# Patient Record
Sex: MALE | Race: WHITE | NOT HISPANIC OR LATINO | Employment: UNEMPLOYED | URBAN - NONMETROPOLITAN AREA
[De-identification: names, ages, dates, MRNs, and addresses within clinical notes are randomized per-mention and may not be internally consistent; named-entity substitution may affect disease eponyms.]

---

## 2017-01-24 ENCOUNTER — COMMUNICATION - GICH (OUTPATIENT)
Dept: FAMILY MEDICINE | Facility: OTHER | Age: 42
End: 2017-01-24

## 2017-10-09 ENCOUNTER — COMMUNICATION - GICH (OUTPATIENT)
Dept: FAMILY MEDICINE | Facility: OTHER | Age: 42
End: 2017-10-09

## 2017-10-09 DIAGNOSIS — R73.9 HYPERGLYCEMIA: ICD-10-CM

## 2017-11-14 ENCOUNTER — HISTORY (OUTPATIENT)
Dept: FAMILY MEDICINE | Facility: OTHER | Age: 42
End: 2017-11-14

## 2017-11-14 ENCOUNTER — OFFICE VISIT - GICH (OUTPATIENT)
Dept: FAMILY MEDICINE | Facility: OTHER | Age: 42
End: 2017-11-14

## 2017-11-14 DIAGNOSIS — M25.531 PAIN IN RIGHT WRIST: ICD-10-CM

## 2017-11-14 DIAGNOSIS — F32.1 MAJOR DEPRESSIVE DISORDER, SINGLE EPISODE, MODERATE (H): ICD-10-CM

## 2017-11-14 DIAGNOSIS — K21.9 GASTRO-ESOPHAGEAL REFLUX DISEASE WITHOUT ESOPHAGITIS: ICD-10-CM

## 2017-11-14 DIAGNOSIS — M77.12 LATERAL EPICONDYLITIS OF LEFT ELBOW: ICD-10-CM

## 2017-11-14 DIAGNOSIS — M54.50 LOW BACK PAIN: ICD-10-CM

## 2017-11-14 DIAGNOSIS — E78.5 HYPERLIPIDEMIA: ICD-10-CM

## 2017-11-14 DIAGNOSIS — R73.9 HYPERGLYCEMIA: ICD-10-CM

## 2017-11-14 DIAGNOSIS — J40 BRONCHITIS: ICD-10-CM

## 2017-11-14 DIAGNOSIS — I10 ESSENTIAL (PRIMARY) HYPERTENSION: ICD-10-CM

## 2017-11-14 DIAGNOSIS — M25.532 PAIN IN LEFT WRIST: ICD-10-CM

## 2017-11-14 LAB
A/G RATIO - HISTORICAL: 2.4 (ref 1–2)
ALBUMIN SERPL-MCNC: 4.6 G/DL (ref 3.5–5.7)
ALP SERPL-CCNC: 88 IU/L (ref 34–104)
ALT (SGPT) - HISTORICAL: 44 IU/L (ref 7–52)
ANION GAP - HISTORICAL: 15 (ref 5–18)
AST SERPL-CCNC: 22 IU/L (ref 13–39)
BILIRUB SERPL-MCNC: 0.6 MG/DL (ref 0.3–1)
BUN SERPL-MCNC: 9 MG/DL (ref 7–25)
BUN/CREAT RATIO - HISTORICAL: 8
CALCIUM SERPL-MCNC: 9.3 MG/DL (ref 8.6–10.3)
CHLORIDE SERPLBLD-SCNC: 97 MMOL/L (ref 98–107)
CHOL/HDL RATIO - HISTORICAL: 5.04
CHOLESTEROL TOTAL: 262 MG/DL
CO2 SERPL-SCNC: 27 MMOL/L (ref 21–31)
CREAT SERPL-MCNC: 1.1 MG/DL (ref 0.7–1.3)
ESTIMATED AVERAGE GLUCOSE: 260 MG/DL
GFR IF NOT AFRICAN AMERICAN - HISTORICAL: >60 ML/MIN/1.73M2
GLOBULIN - HISTORICAL: 1.9 G/DL (ref 2–3.7)
GLUCOSE SERPL-MCNC: 343 MG/DL (ref 70–105)
HDLC SERPL-MCNC: 52 MG/DL (ref 23–92)
HEMOGLOBIN A1C MONITORING (POCT) - HISTORICAL: 10.7 % (ref 4–6.2)
LDLC SERPL CALC-MCNC: 171 MG/DL
NON-HDL CHOLESTEROL - HISTORICAL: 210 MG/DL
POTASSIUM SERPL-SCNC: 4.1 MMOL/L (ref 3.5–5.1)
PROT SERPL-MCNC: 6.5 G/DL (ref 6.4–8.9)
PROVIDER ORDERDED STATUS - HISTORICAL: ABNORMAL
SODIUM SERPL-SCNC: 139 MMOL/L (ref 133–143)
TRIGL SERPL-MCNC: 193 MG/DL

## 2017-11-14 ASSESSMENT — PATIENT HEALTH QUESTIONNAIRE - PHQ9: SUM OF ALL RESPONSES TO PHQ QUESTIONS 1-9: 0

## 2017-12-27 NOTE — PROGRESS NOTES
"Patient Information     Patient Name MRN Sex Herb John [id number] Male 1975      Progress Notes by Sg Szymanski MD at 2017  7:45 AM     Author:  Sg Szymanski MD Service:  (none) Author Type:  Physician     Filed:  2017  8:46 AM Encounter Date:  2017 Status:  Signed     :  Sg Szymanski MD (Physician)            SUBJECTIVE:  Herb Figueroa is a 42 y.o. male here for follow-up. He was last seen in 2016 and diagnosed diabetes. He was started on metformin and was supposed be taking it twice a day and follow up within a month or 2. He has not followed up since. He states that he \"ran out of his insurance.\" He does not check his blood sugars but reports that he takes his metformin generally once daily. He admits to skipping doses occasionally as he forgets them. He has had no side effects.    He continues to have widespread muscle skeletal pain including joint and muscle pain. This seems to be in both his upper and lower extremity. This is a chronic complaint for him.    He continues on Prozac for depression and this has been stable.    He states that he has been having a productive cough for the last couple of weeks. He has occasionally felt short of breath. No wheezing. No fevers or chills.    He is right-hand dominant but has had bilateral wrist and elbow pain. He is here requesting injection in his elbow and wrist on his left side. He states that he uses his left arm quite a bit as a guide while working.      Patient Active Problem List       Diagnosis  Date Noted     SCOTT (obstructive sleep apnea)  10/15/2015     Moderate, sleep study 10-        HYPERLIPIDEMIA, MILD  2011     DEPRESSION, MAJOR  2010     CARPAL TUNNEL SYNDROME, RIGHT  2010     HYPERTENSION  2010     OBESITY       GASTRITIS       with dysphagia          DEGENERATIVE DISC DISEASE, THORACIC SPINE       Thoracic degenerative spine disease/juvenile disc disease- " Samir Acosta          SCOLIOSIS       Dr.Scott Acosta          BACK PAIN, THORACIC REGION       GOUT  10/01/2005     left great toe            Past Medical History:     Diagnosis  Date     Asthma, mild intermittent      Depression     After the death of his daughter in MVA      Essential hypertension, benign      GERD (gastroesophageal reflux disease)      Normal cardiac stress test 02-11     Negative stress test      Suicidal ideation 2013    Hospitalized in [place]      TOBACCO ABUSE     age [age], 1-2 packs per day, Quit Aug 2005         Past Surgical History:      Procedure  Laterality Date     LAP CHOLECYSTECTOMY  11-05    Dr. Dunne       TYMPANOSTOMY         Current Outpatient Prescriptions       Medication  Sig Dispense Refill     azithromycin (ZITHROMAX) 250 mg tablet Take 500 mg (2 tabs) by mouth on day 1, then 250 mg (1 tab) daily for days 2-5. 6 tablet 0     CPAP CPAP at pressure: 8cm H2O Length of Need: Lifetime 1 unit 0     DME C-PAP supplies and filters, Dx: SCOTT 1 Each 0     FLUoxetine (PROZAC) 20 mg capsule TAKE ONE CAPSULE BY MOUTH ONCE DAILY, IN  ADDITION  TO  A  40MG  CAPSULE.  TOTAL  OF  60MG  DAILY. 90 capsule 3     FLUoxetine (PROZAC) 40 mg capsule TAKE ONE CAPSULE BY MOUTH ONCE DAILY IN  ADDITION  TO  20MG  TABLET.  TOTAL  60MG  DAILY. 90 capsule 3     gabapentin (NEURONTIN) 300 mg capsule Take 2 capsules by mouth 3 times daily. 540 capsule 3     lisinopril (PRINIVIL; ZESTRIL) 40 mg tablet Take 1 tablet by mouth once daily. 90 tablet 3     meloxicam 15 mg tablet Take 1 tablet by mouth once daily. 90 tablet 3     metFORMIN (GLUCOPHAGE) 500 mg tablet Take 1 tablet by mouth 2 times daily with meals. 180 tablet 0     omeprazole (PRILOSEC) 20 mg Delayed-Release capsule Take 2 capsules by mouth once daily before a meal. 180 capsule 3     simvastatin (ZOCOR) 20 mg tablet Take 1 tablet by mouth at bedtime. 90 tablet 3     Current Facility-Administered Medications         Medication  Dose Route  "Frequency Provider Last Rate     triamcinolone acetonide 10 mg injection (KENALOG)  10 mg Infiltration one time Sg Szymanski MD       triamcinolone acetonide injection (KENALOG)   Intra-Articular one time Sg Szymanski MD       Medications have been reviewed by me and are current to the best of my knowledge and ability.      Allergies:  No Known Allergies    No family history on file.    Social History        Substance Use Topics          Smoking status:   Former Smoker      Packs/day:  0.00      Years:  20.00      Types:  Cigarettes      Quit date:  8/1/2013      Smokeless tobacco:   Never Used      Alcohol use   Yes      Comment: occaisionally         ROS:    As above otherwise ROS is unremarkable.      OBJECTIVE:  /82  Pulse 68  Ht 1.772 m (5' 9.75\")  Wt 110.7 kg (244 lb)  BMI 35.26 kg/m2    EXAM:  General Appearance: Pleasant, alert, appropriate appearance for age. No acute distress  Head: Normal. Normocephalic, atraumatic.  Eyes: PERRL, EOMI  Ears: Normal TM's bilaterally. Normal auditory canals and external ears.   OroPharynx: Dental hygiene adequate. Normal buccal mucosa. Normal pharynx.  Neck: Supple, no masses or nodes, no lymphadenopathy.  No thyromegaly.  Lungs: Scattered crackles heard bilaterally. No wheezing. No excessive muscle use.  Cardiovascular: Regular rate and rhythm. S1, S2, no murmurs.  Gastrointestinal: Soft, nontender, no abnormal masses or organomegaly. BS normal.  Musculoskeletal: Left elbow shows normal range of motion. His tenderness over his lateral epicondyle and just distal from that. This is worsened with wrist extension. Varus and valgus testing at 20  of elbow flexion are normal.    Left wrist shows normal range of motion. He has some tenderness over the dorsal aspect of his radial carpal joint. Mild swelling is noted.  Skin: no concerning or new rashes.  Neurologic Exam: CN 2-12 grossly intact.  Normal gait.  Symmetric DTRs, No focal motor or sensory deficits. No " tremor.  Psychiatric Exam: Alert and oriented, appropriate affect.    Results for orders placed or performed in visit on 11/14/17      Hgb A1c      Result  Value Ref Range    HEMOGLOBIN A1C MONITORING (POCT) 10.7 (H) 4.0 - 6.2 %    ESTIMATED AVERAGE GLUCOSE  260 mg/dL         ASSESSEMENT AND PLAN:    Herb was seen today for medication management.    Diagnoses and all orders for this visit:    Hyperlipidemia, unspecified hyperlipidemia type  -     simvastatin (ZOCOR) 20 mg tablet; Take 1 tablet by mouth at bedtime.  -     LIPID PANEL; Future  -     COMPLETE METABOLIC PANEL; Future  -     LIPID PANEL  -     COMPLETE METABOLIC PANEL    Gastroesophageal reflux disease without esophagitis  -     omeprazole (PRILOSEC) 20 mg Delayed-Release capsule; Take 2 capsules by mouth once daily before a meal.    Hyperglycemia  -     Hgb A1c; Future  -     Hgb A1c    Pain of both wrist joints  -     meloxicam 15 mg tablet; Take 1 tablet by mouth once daily.    HYPERTENSION  -     lisinopril (PRINIVIL; ZESTRIL) 40 mg tablet; Take 1 tablet by mouth once daily.    Low back pain without sciatica, unspecified back pain laterality, unspecified chronicity  -     gabapentin (NEURONTIN) 300 mg capsule; Take 2 capsules by mouth 3 times daily.    Major depressive disorder, single episode, moderate (HC)  -     FLUoxetine (PROZAC) 40 mg capsule; TAKE ONE CAPSULE BY MOUTH ONCE DAILY IN  ADDITION  TO  20MG  TABLET.  TOTAL  60MG  DAILY.  -     FLUoxetine (PROZAC) 20 mg capsule; TAKE ONE CAPSULE BY MOUTH ONCE DAILY, IN  ADDITION  TO  A  40MG  CAPSULE.  TOTAL  OF  60MG  DAILY.    Bronchitis  -     azithromycin (ZITHROMAX) 250 mg tablet; Take 500 mg (2 tabs) by mouth on day 1, then 250 mg (1 tab) daily for days 2-5.    Left lateral epicondylitis  -     TN DRAIN/INJ INTERMEDIATE JOINT/BURSA WO US GUIDE  -     triamcinolone acetonide 10 mg injection (KENALOG); 1 mL by Infiltration route one time.    Left wrist pain  -     TN DRAIN/INJ INTERMEDIATE  JOINT/BURSA WO US GUIDE  -     triamcinolone acetonide injection (KENALOG); Inject  intra-articular one time.    Other orders  -     Cancel: metFORMIN (GLUCOPHAGE) 500 mg tablet; Take 1 tablet by mouth 2 times daily with meals.     hemoglobin A1c was repeated and can be seen above.  We will titrate metformin to 1000 mg twice daily with the next couple of weeks. He'll follow-up in December for reassessment. Discussed that he'll likely need several oral medications to get his diabetes under better control. We also discussed dietary recommendations as well.    Blood pressures controlled, continue current regimen.    Continue Prozac 60 mg daily for his depression.    Continue Neurontin for chronic low back pain.    We'll treat his upper rest for infection with azithromycin based on his symptoms lasting several weeks.    Suspect his left elbow pain is due to repetition and lateral epicondylitis. Discussed options and he wanted to do an injection. Informed consent was obtained. His left lateral elbow was cleansed the normal fashion. A 2 mL mixture of 10 mg of Kenalog and lidocaine were used to infiltrate the point of maximal tenderness with fan like fashion. He tolerated this well, no immediate complications. He'll ice the area to prevent postinjection flare and follow-up as needed.    Suspect his left wrist pain is due to arthritis. We discussed his options once again he decided he wanted to try injection. Informed consent was again obtained. His left dorsal wrist was cleansed the normal fashion. A 2 mL mixture of 10 mg of Kenalog and lidocaine were used 2 infiltrate the dorsal radial carpal joint space. He tolerated this well, no immediate complications. He'll ice this area to prevent postinjection flare and follow-up as needed.      William Szymanski MD

## 2017-12-28 NOTE — TELEPHONE ENCOUNTER
Patient Information     Patient Name MRN Sex Herb John [id number] Male 1975      Telephone Encounter by Robles Ruiz RN at 10/10/2017  2:02 PM     Author:  Robles Ruiz RN Service:  (none) Author Type:  NURS- Registered Nurse     Filed:  10/10/2017  2:13 PM Encounter Date:  10/9/2017 Status:  Signed     :  Robles Ruiz RN (NURS- Registered Nurse)            Biguanides    Office visit in the past 12 months or per provider note.    Last visit with DENISE REYES was on: 2016 in [facility]  Next visit with DENISE REYES is on: No future appointment listed with this provider  Next visit with Family Practice is on: No future appointment listed in this department    Lab test requirements:  HgbA1c annually or per provider note.  HEMOGLOBIN A1C MONITORING (POCT) (%)    Date Value   2016 9.1 (H)     HEMOGLOBIN A1C GIH (%)    Date Value   2010 5.7     Max refill for 12 months from last office visit or per provider note.    Chart review shows that patient was last seen by PCP for diagnosis of hyperglycemia as well as use of metformin on 16. Plan as per office visit notes on that date as follows:    We'll repeat fasting labs again today including hemoglobin A1c. Both these are elevated we discussed starting metformin 500 mg twice daily. He will then follow up in several weeks to review how he is doing. At that time we can refer him to diabetes education and started having check his blood sugars. Potential side effects were discussed.    No follow up noted. Call placed to patient to discuss. Patient was unavailable at this time. Writer did leave a detailed message on patient's voicemail about the need to follow up with PCP. Writer will refill rx as requested for a limited supply at this time. Will also send patient a reminder letter.    Prescription refilled per RN Medication Refill Policy.................... Robles Ruiz RN ....................  10/10/2017   2:10  PM

## 2017-12-28 NOTE — PATIENT INSTRUCTIONS
Patient Information     Patient Name MRHerb Ramirez [id number] Male 1975      Patient Instructions by Sg Szymanski MD at 2017  8:43 AM     Author:  Sg Szymanski MD  Service:  (none) Author Type:  Physician     Filed:  2017  8:43 AM  Encounter Date:  2017 Status:  Addendum     :  Sg Szymanski MD (Physician)        Related Notes: Original Note by Sg Szymanski MD (Physician) filed at 2017  8:43 AM            Metformin  17  Metformin 1 tab twice a day  17  Metformin 2 tabs in the morning and 1 tab in the evening  17  Metformin 2 tabs twice a day    Follow up in  [place] Related topics   Diabetes: Managing Your Diet   ________________________________________________________________________  KEY POINTS    There are several ways to plan meals to help manage diabetes. Your diabetes care provider will help you find a meal plan that works for you.    Most plans are based on counting carbohydrates (carbs) in food because carbs have the biggest effect on your blood glucose level. Carbohydrates, also called carbs, are a source of energy for your body. Carbs are found in foods such as fruit, soft drinks, candy, pasta, bread, cereals, rice, potatoes, beans, peas, leafy vegetables, and fruits.    It is important to meet with a dietitian to develop a meal plan that fits your taste, budget, and lifestyle.  ________________________________________________________________________  Why is it important to manage my diet when I have diabetes?  Having diabetes means that there is too much sugar (glucose) in your blood. Your body breaks down some of the foods you eat into sugar. Your blood carries the sugar to the cells of your body. You need the sugar in your cells for energy, but too much sugar in your blood is not good for your health. Your body uses insulin to help move sugar from the blood into the cells.   Diabetes is a problem with  the way your body makes or uses insulin. Insulin is made by the pancreas, which is an organ in your upper belly. Type 1 diabetes happens when your pancreas stops making insulin. Type 2 diabetes happens when your pancreas doesn t make enough insulin or your body becomes unable to use the insulin. When your body does not have enough insulin or has trouble using insulin, sugar builds up in your blood and cannot get into your cells.  Type 1 diabetes is treated with insulin, but food choices and exercise are still very important parts of managing your blood glucose and preventing complications. The goal of food choices is to try to keep your blood glucose at a normal level throughout the day. This is done by matching your insulin doses with the types and amounts of food you eat. Meal plans can be designed to fit your lifestyle.  With type 2 diabetes, sometimes you can control your blood glucose with just diet and exercise. Or you may also need to take oral medicine or insulin shots.  In all cases, understanding how the food you eat affects your blood glucose is an important part of taking good care of yourself.  What are the types of meal plans?  There are several ways to plan meals to help manage diabetes. Your diabetes care provider will help you find a meal plan that works for you. Most plans are based on counting carbohydrates (carbs) in food because carbs have the biggest effect on your blood glucose level. Carbohydrates are a source of energy for the body. There are three basic types of carbs: starches, sugars, and dietary fiber.     Sugars such as glucose and fructose raise blood glucose very quickly. Sugar is found in foods such as fruit, milk, soft drinks, baked goods, and candy.    Starches are found in plant-based foods such as pasta, bread, cereals, rice, potatoes, beans, and corn. Some starches are converted to energy very quickly, but others, such as whole grains, are converted more slowly.    Dietary fiber  is the part of plants that cannot be digested. Fiber is found in whole-grain bread and pasta, beans, peas, leafy vegetables, raisins, prunes, apples, and berries. Fiber can help control blood glucose by slowing how quickly your body absorbs sugar from foods.  The most common types of meal plans are:    The Plate method: This is an easy way to make healthy food choices and control portions, carbs, and calories. Fill half of a 9-inch plate with nonstarchy vegetables. One fourth of the plate should have starches such as whole grains and vegetables like potatoes. The last quarter of the plate is for proteins such as lean meat. You can have a portion of low-fat milk or yogurt and fruit on the side. You can also add small amounts of healthy fat. This way of planning your meals is focused on getting more vegetables and less starchy foods. This helps to control blood glucose.    Carbohydrate counting meal plan. This plan helps you figure out the amount of carbs in the foods you eat at each meal or snack and match your insulin dosage to that carb amount. This plan works best if you take insulin with each meal.    Constant carbohydrate meal plan. With the constant carbohydrate meal plan, you eat a set amount of carbohydrates at each meal and snack. You take insulin or other diabetes medicines at the same times and in the same amounts each day.    Exchange meal plan. This plan divides foods into starch, fruit, milk, fat, vegetable, and meat groups. The plan gives you serving sizes for foods in each group that have about the same amount of carbohydrate, protein, fat, and calories. This lets you exchange, or swap, choices from a food list.  It is important to meet with a dietitian to develop a meal plan that fits your taste, budget, and lifestyle.  What is a healthy way for me to eat?    Eat a well-balanced diet. Focus on eating a balance of fresh vegetables and fruit, low-fat dairy, lean meats and fish, beans, whole grains, and  use healthy fats such as canola and olive oil.    Manage carbohydrates carefully. Carbs affect your blood glucose level more than protein or fat. You need to balance how much insulin you take with the amount of carbs you eat. This helps keep your blood glucose at a healthy level and helps prevent many health problems. Testing your blood glucose 2 hours after a meal will help you find out how eating different combinations of foods can affect your blood glucose. Learning how your body responds to foods will help you improve your blood glucose control.    Eat meals at the same time each day. Insulin and oral diabetes medicines will lower your blood glucose whether you eat or not. It s important not to miss meals. Try to eat at about the same time each day to prevent low blood glucose. Carry snacks in case you have a change in your schedule that affects your mealtime.    Use snacks to prevent insulin reactions. Snacks help to keep your blood glucose from going too low after you take insulin. Different types of insulin reach peak activity, at different times, and insulin peaks vary from person to person. You will learn from experience when you need a snack. Different types of snacks have different effects. Sugar from fruit will last 1 or 2 hours, so fruit is good for a morning or afternoon snack. Carbs eaten with proteins, such as low-fat cheese or meat, change to sugar more slowly. If you have low blood glucose during the night, you can add a lean protein to your evening carbohydrate snack. This can help the sugar last through the night. Milk and yogurt are a natural mix of carbohydrate and protein and make a good bedtime snack choice.    Limit cholesterol, saturated fat, and trans fat in your diet. Eating too many foods high in cholesterol, saturated fat, or trans fat can lead to high levels of the fatty materials in your blood called cholesterol and triglycerides. High cholesterol increases your risk for heart  disease. People who have diabetes have a higher risk of getting heart disease, so it is important to watch the cholesterol and fat in your diet. Check food labels for the amounts of cholesterol, saturated fat, and trans fat.  Blood cholesterol and triglyceride levels can be high if blood glucose levels are too high. Your blood cholesterol level and triglyceride level should be checked at least once a year. Your dietitian can help you adjust your diet if you need to.    Keep a healthy weight. Ask your dietitian how many calories you need to eat every day. If you are overweight, talk to your dietitian about making a plan for gradual weight loss. Losing weight will also make it easier to control your blood glucose.    Eat more fiber. Fiber is the part of plants that cannot be digested. Adding fiber may help lower blood glucose levels. For example, your blood glucose may not be as high after eating beans than it is after eating white bread because beans have more fiber. Raw fruits, vegetables, beans, nuts, oatmeal, popcorn, and whole-grain breads are good high-fiber foods.    Avoid foods high in salt (sodium). Eating a lot of salt may raise your blood pressure. High blood pressure increases your risk for stroke and complications of diabetes, such as heart, eye, and kidney problems. Try to eat no more than 1500 milligrams (mg) of sodium each day. Talk to your dietitian about salt.    Avoid eating too much protein if you have kidney problems. Eating too much protein can be a problem if you have kidney disease. Follow your healthcare provider's recommendation for how much protein you should eat. A dietitian can help you plan a lower protein diet.  Is it OK for people with diabetes to drink alcohol?  If you have diabetes, be careful about drinking alcohol. Too much alcohol can make blood glucose levels fall too low. Drinking even a small amount of alcohol on an empty stomach can lead to a very low blood glucose. If you take  insulin or diabetes pills, you have an even greater risk for low blood glucose because alcohol increases the effects of the medicine. If you take some diabetes medicines with alcohol, it can cause serious health problems. Always ask your healthcare provider if it is safe to drink alcohol with any medicines that you take.  For more information, contact    The American Diabetes Association   437.372.5295   http://www.diabetes.org  Developed by [employer].  Adult Advisor 2016.3 published by [employer].  Last modified: 2016-03-03  Last reviewed: 2015-06-08  This content is reviewed periodically and is subject to change as new health information becomes available. The information is intended to inform and educate and is not a replacement for medical evaluation, advice, diagnosis or treatment by a healthcare professional.  References   Adult Advisor 2016.3 Index    Copyright   2016 [employer], a division of [employer] All rights reserved.

## 2017-12-30 NOTE — NURSING NOTE
Patient Information     Patient Name MRN Herb Cardenas [id number] Male 1975      Nursing Note by Ivonne Gay at 2017  7:45 AM     Author:  Ivonne Gay Service:  (none) Author Type:  (none)     Filed:  2017  7:59 AM Encounter Date:  2017 Status:  Signed     :  Ivonne Gay            Patient presents today for medication management.  Ivonne Gay LPN .............2017  7:53 AM

## 2017-12-30 NOTE — NURSING NOTE
Patient Information     Patient Name MRN Sex Herb John [id number] Male 1975      Nursing Note by Ivonne Gay at 2017  7:45 AM     Author:  Ivonne Gay Service:  (none) Author Type:  (none)     Filed:  2017  8:28 AM Encounter Date:  2017 Status:  Signed     :  Ivonne Gay            Universal Protocol    A. Pre-procedure verification complete yes  1-relevant information / documentation available, reviewed and properly matched to the patient; 2-consent accurate and complete, 3-equipment and supplies available    B. Site marking complete N/A  Site marked if not in continuous attendance with patient    C. TIME OUT completed yes  Time Out was conducted just prior to starting procedure to verify the eight required elements: 1-patient identity, 2-consent accurate and complete, 3-position, 4-correct side/site marked (if applicable), 5-procedure, 6-relevant images / results properly labeled and displayed (if applicable), 7-antibiotics / irrigation fluids (if applicable), 8-safety precautions.

## 2018-01-03 NOTE — TELEPHONE ENCOUNTER
Patient Information     Patient Name MRN Sex Herb John [id number] Male 1975      Telephone Encounter by Robles Ruiz RN at 2017  4:12 PM     Author:  Robles Ruiz RN Service:  (none) Author Type:  NURS- Registered Nurse     Filed:  2017  4:14 PM Encounter Date:  2017 Status:  Signed     :  Robles Ruiz RN (NURS- Registered Nurse)            After writer had completed this encounter and sent it to PCP, writer noted telephone encounter dated today to disregard faxed request for a substitution for zocor/simvastatin. Call placed to [facility], spoke to Justina, pharmacist who states that Rx came back at $1.98. No substitution needed for Zocor at this time. Will disregard request and remove from PCP's in-basket.    Robles Ruiz RN ....................  2017   4:14 PM

## 2018-01-03 NOTE — TELEPHONE ENCOUNTER
Patient Information     Patient Name MRN Sex Herb John [id number] Male 1975      Telephone Encounter by Robles Ruiz RN at 2017  3:55 PM     Author:  Robles Ruiz RN Service:  (none) Author Type:  NURS- Registered Nurse     Filed:  2017  4:07 PM Encounter Date:  2017 Status:  Signed     :  Robles Ruiz RN (NURS- Registered Nurse)            Received faxed request from [facility] pharmacy with relation to patient's Zocor 20 mg tabs. Per fax, patient lost his insurance, [facility] is wondering if PCP would be willing to change patient to lovastatin for price as either the 10 mg or 20 mg tablets are on the 4$ list.     Will send message to PCP for his consideration.    Unable to complete prescription refill per RN Medication Refill Policy.................... Robles Ruiz RN ....................  2017   4:06 PM

## 2018-01-03 NOTE — TELEPHONE ENCOUNTER
Patient Information     Patient Name MRN Sex Herb John [id number] Male 1975      Telephone Encounter by Renee Abdi at 2017  8:49 AM     Author:  Renee Abdi Service:  (none) Author Type:  (none)     Filed:  2017  8:50 AM Encounter Date:  2017 Status:  Signed     :  Renee Abdi LPN   2017  8:49 AM

## 2018-01-09 ENCOUNTER — COMMUNICATION - GICH (OUTPATIENT)
Dept: FAMILY MEDICINE | Facility: OTHER | Age: 43
End: 2018-01-09

## 2018-01-09 DIAGNOSIS — R73.9 HYPERGLYCEMIA: ICD-10-CM

## 2018-01-17 PROBLEM — E66.9 OBESITY: Status: ACTIVE | Noted: 2018-01-17

## 2018-01-17 PROBLEM — K29.70 GASTRITIS: Status: ACTIVE | Noted: 2018-01-17

## 2018-01-17 PROBLEM — M54.6 PAIN IN THORACIC SPINE: Status: ACTIVE | Noted: 2018-01-17

## 2018-01-17 PROBLEM — M41.20 SCOLIOSIS (AND KYPHOSCOLIOSIS), IDIOPATHIC: Status: ACTIVE | Noted: 2018-01-17

## 2018-01-17 RX ORDER — AZITHROMYCIN 250 MG/1
TABLET, FILM COATED ORAL
COMMUNITY
Start: 2017-11-14 | End: 2018-04-17

## 2018-01-17 RX ORDER — SIMVASTATIN 20 MG
20 TABLET ORAL AT BEDTIME
COMMUNITY
Start: 2017-11-14 | End: 2018-12-08

## 2018-01-17 RX ORDER — FLUOXETINE 40 MG/1
CAPSULE ORAL
COMMUNITY
Start: 2017-11-14 | End: 2018-12-08

## 2018-01-17 RX ORDER — GABAPENTIN 300 MG/1
600 CAPSULE ORAL 3 TIMES DAILY
COMMUNITY
Start: 2017-11-14 | End: 2019-01-05

## 2018-01-17 RX ORDER — LISINOPRIL 40 MG/1
40 TABLET ORAL DAILY
COMMUNITY
Start: 2017-11-14 | End: 2019-01-05

## 2018-01-17 RX ORDER — MELOXICAM 15 MG/1
15 TABLET ORAL DAILY
COMMUNITY
Start: 2017-11-14 | End: 2019-02-12

## 2018-01-26 VITALS
SYSTOLIC BLOOD PRESSURE: 138 MMHG | WEIGHT: 244 LBS | HEIGHT: 70 IN | DIASTOLIC BLOOD PRESSURE: 82 MMHG | HEART RATE: 68 BPM | BODY MASS INDEX: 34.93 KG/M2

## 2018-01-28 ASSESSMENT — PATIENT HEALTH QUESTIONNAIRE - PHQ9: SUM OF ALL RESPONSES TO PHQ QUESTIONS 1-9: 0

## 2018-02-12 NOTE — TELEPHONE ENCOUNTER
Patient Information     Patient Name MRN Sex Herb John [id number] Male 1975      Telephone Encounter by Robles Ruiz RN at 2018 10:12 AM     Author:  Robles Ruiz RN Service:  (none) Author Type:  NURS- Registered Nurse     Filed:  2018 10:17 AM Encounter Date:  2018 Status:  Signed     :  Robles Ruiz RN (NURS- Registered Nurse)            Biguanides    Office visit in the past 12 months or per provider note.    Last visit with DENISE REYES was on: 2017 in [facility]  Next visit with DENISE REYES is on: No future appointment listed with this provider  Next visit with Family Practice is on: No future appointment listed in this department    Lab test requirements:  HgbA1c annually or per provider note.  HEMOGLOBIN A1C MONITORING (POCT) (%)    Date Value   2017 10.7 (H)     HEMOGLOBIN A1C GIH (%)    Date Value   2010 5.7     Max refill for 12 months from last office visit or per provider note.    Chart review shows that patient was last seen on 17 for diagnosis of diabetes. Patient was to follow up in 2017. No follow up noted. Writer will refill rx as requested for a limited supply at this time, and will send patient a reminder letter. Did update sig to reflect most current dosing of rx if patient did indeed taper up as ordered on 17.    Prescription refilled per RN Medication Refill Policy.................... Robles Ruiz RN ....................  2018   10:15 AM

## 2018-04-20 ENCOUNTER — OFFICE VISIT (OUTPATIENT)
Dept: FAMILY MEDICINE | Facility: OTHER | Age: 43
End: 2018-04-20
Attending: FAMILY MEDICINE
Payer: COMMERCIAL

## 2018-04-20 VITALS
SYSTOLIC BLOOD PRESSURE: 136 MMHG | DIASTOLIC BLOOD PRESSURE: 79 MMHG | HEART RATE: 73 BPM | BODY MASS INDEX: 36.22 KG/M2 | WEIGHT: 253 LBS | HEIGHT: 70 IN

## 2018-04-20 DIAGNOSIS — E11.9 TYPE 2 DIABETES MELLITUS WITHOUT COMPLICATION, WITHOUT LONG-TERM CURRENT USE OF INSULIN (H): Primary | ICD-10-CM

## 2018-04-20 DIAGNOSIS — I10 ESSENTIAL HYPERTENSION: ICD-10-CM

## 2018-04-20 DIAGNOSIS — K29.50 CHRONIC GASTRITIS WITHOUT BLEEDING, UNSPECIFIED GASTRITIS TYPE: ICD-10-CM

## 2018-04-20 PROBLEM — E66.01 MORBID OBESITY (H): Status: ACTIVE | Noted: 2018-04-20

## 2018-04-20 LAB — HBA1C MFR BLD: 9.5 % (ref 4–6)

## 2018-04-20 PROCEDURE — 99213 OFFICE O/P EST LOW 20 MIN: CPT | Performed by: FAMILY MEDICINE

## 2018-04-20 PROCEDURE — 83036 HEMOGLOBIN GLYCOSYLATED A1C: CPT | Performed by: FAMILY MEDICINE

## 2018-04-20 PROCEDURE — 36415 COLL VENOUS BLD VENIPUNCTURE: CPT | Performed by: FAMILY MEDICINE

## 2018-04-20 PROCEDURE — G0463 HOSPITAL OUTPT CLINIC VISIT: HCPCS

## 2018-04-20 RX ORDER — OMEPRAZOLE 40 MG/1
40 CAPSULE, DELAYED RELEASE ORAL
Qty: 90 CAPSULE | Refills: 3 | Status: SHIPPED | OUTPATIENT
Start: 2018-04-20 | End: 2019-11-26

## 2018-04-20 ASSESSMENT — ANXIETY QUESTIONNAIRES
6. BECOMING EASILY ANNOYED OR IRRITABLE: NEARLY EVERY DAY
3. WORRYING TOO MUCH ABOUT DIFFERENT THINGS: MORE THAN HALF THE DAYS
GAD7 TOTAL SCORE: 18
1. FEELING NERVOUS, ANXIOUS, OR ON EDGE: NEARLY EVERY DAY
IF YOU CHECKED OFF ANY PROBLEMS ON THIS QUESTIONNAIRE, HOW DIFFICULT HAVE THESE PROBLEMS MADE IT FOR YOU TO DO YOUR WORK, TAKE CARE OF THINGS AT HOME, OR GET ALONG WITH OTHER PEOPLE: VERY DIFFICULT
7. FEELING AFRAID AS IF SOMETHING AWFUL MIGHT HAPPEN: NEARLY EVERY DAY
2. NOT BEING ABLE TO STOP OR CONTROL WORRYING: NEARLY EVERY DAY
5. BEING SO RESTLESS THAT IT IS HARD TO SIT STILL: MORE THAN HALF THE DAYS

## 2018-04-20 ASSESSMENT — PATIENT HEALTH QUESTIONNAIRE - PHQ9: 5. POOR APPETITE OR OVEREATING: MORE THAN HALF THE DAYS

## 2018-04-20 NOTE — MR AVS SNAPSHOT
"              After Visit Summary   2018    Charles Leopold Matter    MRN: [id number]           Patient Information     Date Of Birth          1975        Visit Information        Provider Department      2018 3:00 PM Sg Szymanski MD [facility]        Today's Diagnoses     Type 2 diabetes mellitus without complication, without long-term current use of insulin (H)    -  1    Chronic gastritis without bleeding, unspecified gastritis type        Essential hypertension           Follow-ups after your visit        Who to contact     If you have questions or need follow up information about today's clinic visit or your schedule please contact [facility] directly at [phone].  Normal or non-critical lab and imaging results will be communicated to you by [employer], letter or phone within 4 business days after the clinic has received the results. If you do not hear from us within 7 days, please contact the clinic through [employer] or phone. If you have a critical or abnormal lab result, we will notify you by phone as soon as possible.  Submit refill requests through [employer] or call your pharmacy and they will forward the refill request to us. Please allow 3 business days for your refill to be completed.          Additional Information About Your Visit        MyChart Information     [employer] lets you send messages to your doctor, view your test results, renew your prescriptions, schedule appointments and more. To sign up, go to [email or web address] . Click on \"Log in\" on the left side of the screen, which will take you to the Welcome page. Then click on \"Sign up Now\" on the right side of the page.     You will be asked to enter the access code listed below, as well as some personal information. Please follow the directions to create your username and password.     Your access code is: [email or web address]  Expires: 2018  3:05 PM     Your access code will  in " "90 days. If you need help or a new code, please call your [place] clinic or [phone].        Care EveryWhere ID     This is your Care EveryWhere ID. This could be used by other organizations to access your [place] medical records  [id number]        Your Vitals Were     Pulse Height BMI (Body Mass Index)             73 5' 10\" (1.778 m) 36.3 kg/m2          Blood Pressure from Last 3 Encounters:   04/20/18 136/79   11/14/17 138/82   11/29/16 130/86    Weight from Last 3 Encounters:   04/20/18 253 lb (114.8 kg)   11/14/17 244 lb (110.7 kg)   11/29/16 256 lb (116.1 kg)              We Performed the Following     Hemoglobin A1c          Today's Medication Changes          These changes are accurate as of 4/20/18  3:05 PM.  If you have any questions, ask your nurse or doctor.               Start taking these medicines.        Dose/Directions    metFORMIN 1000 MG tablet   Commonly known as:  GLUCOPHAGE   Used for:  Type 2 diabetes mellitus without complication, without long-term current use of insulin (H)   Started by:  Sg Szymanski MD        Dose:  1000 mg   Take 1 tablet (1,000 mg) by mouth 2 times daily (with meals)   Quantity:  90 tablet   Refills:  3         These medicines have changed or have updated prescriptions.        Dose/Directions    omeprazole 40 MG capsule   Commonly known as:  priLOSEC   This may have changed:  medication strength   Used for:  Type 2 diabetes mellitus without complication, without long-term current use of insulin (H)   Changed by:  Sg Szymanski MD        Dose:  40 mg   Take 1 capsule (40 mg) by mouth daily with food   Quantity:  90 capsule   Refills:  3            Where to get your medicines      These medications were sent to [facility] [address] [place] [id number]     Phone:  [phone]     metFORMIN 1000 MG tablet         Call your pharmacy to confirm that your medication is ready for pickup. It may " take up to 24 hours for them to receive the prescription. If the prescription is not ready within 3 business days, please contact your clinic or your provider.     We will let you know when these medications are ready. If you don't hear back within 3 business days, please contact us.     omeprazole 40 MG capsule                Primary Care Provider Office Phone # Fax #    Sg Szymanski [phone] [address] [facility] [address]        Equal Access to Services     [facility]: Hadii aad ku hadasho Soomaali, waaxda luqadaha, qaybta kaalmada adeegyada, waxay idiin hayaan adeeg jonathanarash lasoumya . So [facility] [phone].    ATENCIÓN: Si habla español, tiene a liu disposición servicios gratuitos de asistencia lingüística. [facility] [phone].    We comply with applicable federal civil rights laws and [place] laws. We do not discriminate on the basis of race, color, national origin, age, disability, sex, sexual orientation, or gender identity.            Thank you!     Thank you for choosing [facility] AND [facility]  for your care. Our goal is always to provide you with excellent care. Hearing back from our patients is one way we can continue to improve our services. Please take a few minutes to complete the written survey that you may receive in the mail after your visit with us. Thank you!             Your Updated Medication List - Protect others around you: Learn how to safely use, store and throw away your medicines at www.disposemymeds.org.          This list is accurate as of 4/20/18  3:05 PM.  Always use your most recent med list.                   Brand Name Dispense Instructions for use Diagnosis    acetaminophen 650 MG CR tablet    TYLENOL    60 tablet    Take 2 tablets (1,300 mg) by mouth 3 times daily as needed    Suicidal ideation       albuterol 108 (90 Base) MCG/ACT Inhaler    PROAIR HFA/PROVENTIL HFA/VENTOLIN HFA    1 Inhaler    Inhale 2 puffs into the lungs  every 4 hours as needed (cough)    Suicidal ideation       diclofenac 75 MG EC tablet    VOLTAREN    60 tablet    Take 1 tablet (75 mg) by mouth 2 times daily    Suicidal ideation       ezetimibe-simvastatin 10-20 MG per tablet    VYTORIN     Take 1 tablet by mouth At Bedtime.        * FLUoxetine 20 MG capsule    PROzac     TAKE ONE CAPSULE BY MOUTH ONCE DAILY, IN  ADDITION  TO  A  40MG  CAPSULE.  TOTAL  OF  60MG  DAILY.        * FLUoxetine 40 MG capsule    PROzac     TAKE ONE CAPSULE BY MOUTH ONCE DAILY IN  ADDITION  TO  20MG  TABLET.  TOTAL  60MG  DAILY.        fluticasone 50 MCG/ACT spray    FLONASE    1 Package    Spray 1 spray into both nostrils At Bedtime    Suicidal ideation       gabapentin 300 MG capsule    NEURONTIN     Take 600 mg by mouth 3 times daily        lisinopril 40 MG tablet    PRINIVIL/ZESTRIL     Take 40 mg by mouth daily        meloxicam 15 MG tablet    MOBIC     Take 15 mg by mouth daily        metFORMIN 1000 MG tablet    GLUCOPHAGE    90 tablet    Take 1 tablet (1,000 mg) by mouth 2 times daily (with meals)    Type 2 diabetes mellitus without complication, without long-term current use of insulin (H)       omeprazole 40 MG capsule    priLOSEC    90 capsule    Take 1 capsule (40 mg) by mouth daily with food    Type 2 diabetes mellitus without complication, without long-term current use of insulin (H)       order for DME      CPAP at pressure: 8cm H2O Length of Need: Lifetime        order for DME      C-PAP supplies and filters, Dx: [facility]        simvastatin 20 MG tablet    ZOCOR     Take 20 mg by mouth At Bedtime        * Notice:  This list has 2 medication(s) that are the same as other medications prescribed for you. Read the directions carefully, and ask your doctor or other care provider to review them with you.

## 2018-04-20 NOTE — PROGRESS NOTES
"SUBJECTIVE:  Charles Leopold Matter is a 42 year old male here for follow-up.  He has a history of type 2 diabetes which is been poorly controlled.  Over the last couple of months his insurance is finally been straightened out he is able to take metformin twice a day.  He does not check his blood sugars at home.  He continues to have some loose stools for metformin.    Allergies:  No Known Allergies    ROS:    As above otherwise ROS is unremarkable.    OBJECTIVE:  /79 (BP Location: Right arm, Patient Position: Sitting, Cuff Size: Adult Large)  Pulse 73  Ht 5' 10\" (1.778 m)  Wt 253 lb (114.8 kg)  BMI 36.3 kg/m2    EXAM:  General Appearance: Pleasant, alert, appropriate appearance for age. No acute distress  Lungs: Normal chest wall and respirations. Clear to auscultation, no wheezes or crackles.  Cardiovascular: Regular rate and rhythm. S1, S2, no murmurs.  Musculoskeletal: No edema.  Neuro: No filament testing is normal bilaterally.    ASSESSEMENT AND PLAN:    1. Type 2 diabetes mellitus without complication, without long-term current use of insulin (H)    2. Chronic gastritis without bleeding, unspecified gastritis type    3. Essential hypertension      We will update hemoglobin A1c.  If this continues to be significantly elevated we will start a second oral medication.  This was discussed with him.  He will be due for fasting lipids again in the fall.  Blood pressures controlled.  Encouraged follow-up with his eye doctor as he has not been seen over the last couple of years.    William Szymanski MD    This document was prepared using voice generated software.  While every attempt was made for accuracy, grammatical errors may exist.  "

## 2018-04-21 ASSESSMENT — PATIENT HEALTH QUESTIONNAIRE - PHQ9: SUM OF ALL RESPONSES TO PHQ QUESTIONS 1-9: 16

## 2018-04-21 ASSESSMENT — ANXIETY QUESTIONNAIRES: GAD7 TOTAL SCORE: 18

## 2018-04-23 ENCOUNTER — TELEPHONE (OUTPATIENT)
Dept: FAMILY MEDICINE | Facility: OTHER | Age: 43
End: 2018-04-23

## 2018-04-23 DIAGNOSIS — E11.9 TYPE 2 DIABETES MELLITUS WITHOUT COMPLICATION, WITHOUT LONG-TERM CURRENT USE OF INSULIN (H): Primary | ICD-10-CM

## 2018-04-24 NOTE — TELEPHONE ENCOUNTER
Left message to call back. Patient is calling regarding results on lab work. If patient doesn't return call, please try calling after 4 pm.  Ivonne Gay LPN ....................  4/24/2018   8:46 AM

## 2018-04-25 RX ORDER — GLIPIZIDE 10 MG/1
10 TABLET, FILM COATED, EXTENDED RELEASE ORAL DAILY
Qty: 30 TABLET | Refills: 1 | Status: SHIPPED | OUTPATIENT
Start: 2018-04-25 | End: 2018-06-22

## 2018-04-25 NOTE — TELEPHONE ENCOUNTER
After birth date was verified, spoke with patient. Patient states that he would like to start the new medication for better A1C control. Patient is wondering if he should be taking this new medication in addition to his metformin?    Please advise.      Jose Roberto Walls LPN 04/25/18 10:21 AM

## 2018-04-25 NOTE — TELEPHONE ENCOUNTER
After birth date was verified, spoke with patient and let him know the below information. No further questions or concerns at this time.        Jose Roberto Walls LPN 04/25/18 11:34 AM

## 2018-04-25 NOTE — TELEPHONE ENCOUNTER
I sent glipizide one tablet daily (monrings) into his pharmacy.  He should be seen in 1 month to see how he is doing.  He should also start checking his blood sugars several times daily (at least in the morning and before and 2 hours after dinner).  He should take this in addition to his metformin.

## 2018-06-22 DIAGNOSIS — E11.9 TYPE 2 DIABETES MELLITUS WITHOUT COMPLICATION, WITHOUT LONG-TERM CURRENT USE OF INSULIN (H): ICD-10-CM

## 2018-06-22 RX ORDER — GLIPIZIDE 10 MG/1
TABLET, FILM COATED, EXTENDED RELEASE ORAL
Qty: 30 TABLET | Refills: 1 | Status: SHIPPED | OUTPATIENT
Start: 2018-06-22 | End: 2018-09-04

## 2018-07-23 ENCOUNTER — TRANSFERRED RECORDS (OUTPATIENT)
Dept: HEALTH INFORMATION MANAGEMENT | Facility: OTHER | Age: 43
End: 2018-07-23

## 2018-07-23 NOTE — PROGRESS NOTES
Patient Information     Patient Name  Herb Figueroa MRN  [id number] Sex  Male   1975      Letter by Sg Szymanski MD at      Author:  Sg Szymanski MD Service:  (none) Author Type:  (none)    Filed:   Encounter Date:  2018 Status:  (Other)           Herb Figueroa  [address] [facility] [address]          2018    Dear Mr. Figueroa:    This is to remind you that you are due for your 1 month follow-up appointment with Sg Szymanski MD. Your last visit was on 17. Additional refills of your medication require you to complete this visit.    Please call [phone] to schedule your appointment.    Thank you for choosing [facility] for your health care needs.    Sincerely,      Refill RN  [facility]

## 2018-07-23 NOTE — PROGRESS NOTES
Patient Information     Patient Name  Herb Figueroa MRN  [id number] Sex  Male   1975      Letter by Sg Szymanski MD at      Author:  Sg Szymanski MD Service:  (none) Author Type:  (none)    Filed:   Encounter Date:  10/9/2017 Status:  (Other)           Herb Figueroa  [address] [facility] [address]          October 10, 2017    Dear Mr. Figueroa:    This is to remind you that you are overdue for your follow up appointment with Sg Szymanski MD in relation to continued use of metformin. Your last visit was on 16 and Dr. Szymanski had requested that you follow up in a couple of weeks. Additional refills of your medication require you to complete this visit.    Please call [phone] to schedule your appointment.    Thank you for choosing [facility] for your health care needs.    Sincerely,      Refill RN  [facility]

## 2018-07-23 NOTE — PROGRESS NOTES
Patient Information     Patient Name  Herb Figueroa MRN  [id number] Sex  Male   1975      Letter by Sg Szymanski MD at      Author:  Sg Szymanski MD Service:  (none) Author Type:  (none)    Filed:   Encounter Date:  2017 Status:  (Other)           Herb Figueroa  [address] [facility] [address]          2017    Dear Mr. Figueroa:    Your recent lab values can be seen below.     As we discussed in clinic her diabetes control has worsened. Also your cholesterol panel is significantly worsen than previous. We'll discuss this further at your follow-up visit.    If you have any questions, do not hesitate to contact me.    Results for orders placed or performed in visit on 17      Hgb A1c      Result  Value Ref Range    HEMOGLOBIN A1C MONITORING (POCT) 10.7 (H) 4.0 - 6.2 %    ESTIMATED AVERAGE GLUCOSE  260 mg/dL   LIPID PANEL      Result  Value Ref Range    CHOLESTEROL,TOTAL 262 (H) <200 mg/dL    TRIGLYCERIDES 193 (H) <150 mg/dL    HDL CHOLESTEROL 52 23 - 92 mg/dL    NON-HDL CHOLESTEROL 210 (H) <145 mg/dl    CHOL/HDL RATIO            5.04 (H) <4.50                    LDL CHOLESTEROL 171 (H) <100 mg/dL    PROVIDER ORDERED STATUS RANDOM    COMPLETE METABOLIC PANEL      Result  Value Ref Range    SODIUM 139 133 - 143 mmol/L    POTASSIUM 4.1 3.5 - 5.1 mmol/L    CHLORIDE 97 (L) 98 - 107 mmol/L    CO2,TOTAL 27 21 - 31 mmol/L    ANION GAP 15 5 - 18                    GLUCOSE 343 (H) 70 - 105 mg/dL    CALCIUM 9.3 8.6 - 10.3 mg/dL    BUN 9 7 - 25 mg/dL    CREATININE 1.10 0.70 - 1.30 mg/dL    BUN/CREAT RATIO           8                    GFR if African American >60 >60 ml/min/1.73m2    GFR if not African American >60 >60 ml/min/1.73m2    ALBUMIN 4.6 3.5 - 5.7 g/dL    PROTEIN,TOTAL 6.5 6.4 - 8.9 g/dL    GLOBULIN                  1.9 (L) 2.0 - 3.7 g/dL    A/G RATIO 2.4 (H) 1.0 - 2.0                    BILIRUBIN,TOTAL 0.6 0.3 - 1.0 mg/dL    ALK PHOSPHATASE 88 34 - 104 IU/L    ALT (SGPT)  44 7 - 52 IU/L    AST (SGOT) 22 13 - 39 IU/L         Sincerely,        William Szymanski MD  Family Medicine

## 2018-08-21 ENCOUNTER — OFFICE VISIT (OUTPATIENT)
Dept: FAMILY MEDICINE | Facility: OTHER | Age: 43
End: 2018-08-21
Attending: FAMILY MEDICINE
Payer: COMMERCIAL

## 2018-08-21 VITALS
WEIGHT: 258.4 LBS | DIASTOLIC BLOOD PRESSURE: 88 MMHG | HEART RATE: 64 BPM | SYSTOLIC BLOOD PRESSURE: 138 MMHG | BODY MASS INDEX: 37.08 KG/M2

## 2018-08-21 DIAGNOSIS — I10 ESSENTIAL HYPERTENSION: ICD-10-CM

## 2018-08-21 DIAGNOSIS — E11.9 TYPE 2 DIABETES MELLITUS WITHOUT COMPLICATION, WITHOUT LONG-TERM CURRENT USE OF INSULIN (H): Primary | ICD-10-CM

## 2018-08-21 DIAGNOSIS — G56.01 CARPAL TUNNEL SYNDROME, RIGHT: ICD-10-CM

## 2018-08-21 LAB — HBA1C MFR BLD: 6.9 % (ref 4–6)

## 2018-08-21 PROCEDURE — 83036 HEMOGLOBIN GLYCOSYLATED A1C: CPT | Performed by: FAMILY MEDICINE

## 2018-08-21 PROCEDURE — G0463 HOSPITAL OUTPT CLINIC VISIT: HCPCS

## 2018-08-21 PROCEDURE — 36415 COLL VENOUS BLD VENIPUNCTURE: CPT | Performed by: FAMILY MEDICINE

## 2018-08-21 PROCEDURE — 99213 OFFICE O/P EST LOW 20 MIN: CPT | Performed by: FAMILY MEDICINE

## 2018-08-21 ASSESSMENT — PAIN SCALES - GENERAL: PAINLEVEL: SEVERE PAIN (6)

## 2018-08-21 NOTE — MR AVS SNAPSHOT
"              After Visit Summary   8/21/2018    Charles Leopold Matter    MRN: [id number]           Patient Information     Date Of Birth          1975        Visit Information        Provider Department      8/21/2018 11:00 AM Sg Szymanski MD [facility]        Today's Diagnoses     Type 2 diabetes mellitus without complication, without long-term current use of insulin (H)    -  1    Carpal tunnel syndrome, right        Essential hypertension           Follow-ups after your visit        Future tests that were ordered for you today     Open Future Orders        Priority Expected Expires Ordered    Hemoglobin A1c Routine  8/21/2019 8/21/2018            Who to contact     If you have questions or need follow up information about today's clinic visit or your schedule please contact [facility] directly at [phone].  Normal or non-critical lab and imaging results will be communicated to you by AdmitSeehart, letter or phone within 4 business days after the clinic has received the results. If you do not hear from us within 7 days, please contact the clinic through AdmitSeehart or phone. If you have a critical or abnormal lab result, we will notify you by phone as soon as possible.  Submit refill requests through Hall[employer] Media or call your pharmacy and they will forward the refill request to us. Please allow 3 business days for your refill to be completed.          Additional Information About Your Visit        AdmitSeehart Information     Hall[employer] lets you send messages to your doctor, view your test results, renew your prescriptions, schedule appointments and more. To sign up, go to [email or web address] . Click on \"Log in\" on the left side of the screen, which will take you to the Welcome page. Then click on \"Sign up Now\" on the right side of the page.     You will be asked to enter the access code listed below, as well as some personal information. Please follow the directions to " create your username and password.     Your access code is: [email or web address]  Expires: 2018 11:52 AM     Your access code will  in 90 days. If you need help or a new code, please call your [facility] clinic or [phone].        Care EveryWhere ID     This is your Care EveryWhere ID. This could be used by other organizations to access your [facility] medical records  [id number]        Your Vitals Were     Pulse BMI (Body Mass Index)                64 37.08 kg/m2           Blood Pressure from Last 3 Encounters:   18 138/88   18 136/79   17 138/82    Weight from Last 3 Encounters:   18 258 lb 6.4 oz (117.2 kg)   18 253 lb (114.8 kg)   17 244 lb (110.7 kg)                 Today's Medication Changes          These changes are accurate as of 18 11:52 AM.  If you have any questions, ask your nurse or doctor.               Stop taking these medicines if you haven't already. Please contact your care team if you have questions.     ezetimibe-simvastatin 10-20 MG per tablet   Commonly known as:  VYTORIN   Stopped by:  Sg Szymanski MD                    Primary Care Provider Office Phone # Fax #    Sg Szymanski [phone] [address] [facility] [address]        Equal Access to Services     [facility] AH: Hadii khushboo garcia hadasho Somary, waaxda luqadaha, qaybta kaalmada todd, allie rossi . So [facility] [phone].    ATENCIÓN: Si habla español, tiene a liu disposición servicios gratuitos de asistencia lingüística. Llame al [phone].    We comply with applicable federal civil rights laws and [place] laws. We do not discriminate on the basis of race, color, national origin, age, disability, sex, sexual orientation, or gender identity.            Thank you!     Thank you for choosing [facility]  for your care. Our goal is always to provide you with excellent care. Hearing back from our  patients is one way we can continue to improve our services. Please take a few minutes to complete the written survey that you may receive in the mail after your visit with us. Thank you!             Your Updated Medication List - Protect others around you: Learn how to safely use, store and throw away your medicines at www.disposemymeds.org.          This list is accurate as of 8/21/18 11:52 AM.  Always use your most recent med list.                   Brand Name Dispense Instructions for use Diagnosis    acetaminophen 650 MG CR tablet    TYLENOL    60 tablet    Take 2 tablets (1,300 mg) by mouth 3 times daily as needed    Suicidal ideation       albuterol 108 (90 Base) MCG/ACT inhaler    PROAIR HFA/PROVENTIL HFA/VENTOLIN HFA    1 Inhaler    Inhale 2 puffs into the lungs every 4 hours as needed (cough)    Suicidal ideation       diclofenac 75 MG EC tablet    VOLTAREN    60 tablet    Take 1 tablet (75 mg) by mouth 2 times daily    Suicidal ideation       * FLUoxetine 20 MG capsule    PROzac     TAKE ONE CAPSULE BY MOUTH ONCE DAILY, IN  ADDITION  TO  A  40MG  CAPSULE.  TOTAL  OF  60MG  DAILY.        * FLUoxetine 40 MG capsule    PROzac     TAKE ONE CAPSULE BY MOUTH ONCE DAILY IN  ADDITION  TO  20MG  TABLET.  TOTAL  60MG  DAILY.        fluticasone 50 MCG/ACT spray    FLONASE    1 Package    Spray 1 spray into both nostrils At Bedtime    Suicidal ideation       gabapentin 300 MG capsule    NEURONTIN     Take 600 mg by mouth 3 times daily        glipiZIDE 10 MG 24 hr tablet    GLUCOTROL XL    30 tablet    TAKE 1 TABLET BY MOUTH ONCE DAILY    Type 2 diabetes mellitus without complication, without long-term current use of insulin (H)       lisinopril 40 MG tablet    PRINIVIL/ZESTRIL     Take 40 mg by mouth daily        meloxicam 15 MG tablet    MOBIC     Take 15 mg by mouth daily        metFORMIN 1000 MG tablet    GLUCOPHAGE    90 tablet    Take 1 tablet (1,000 mg) by mouth 2 times daily (with meals)    Type 2 diabetes  mellitus without complication, without long-term current use of insulin (H)       omeprazole 40 MG capsule    priLOSEC    90 capsule    Take 1 capsule (40 mg) by mouth daily with food    Type 2 diabetes mellitus without complication, without long-term current use of insulin (H)       order for DME      CPAP at pressure: 8cm H2O Length of Need: Lifetime        order for DME      C-PAP supplies and filters, Dx: SCOTT        simvastatin 20 MG tablet    ZOCOR     Take 20 mg by mouth At Bedtime        * Notice:  This list has 2 medication(s) that are the same as other medications prescribed for you. Read the directions carefully, and ask your doctor or other care provider to review them with you.

## 2018-08-21 NOTE — LETTER
August 21, 2018      Charles Leopold Matter  [address] [facility] [address]        Dear ,    We are writing to inform you of your test results.    Your diabetes shows excellent control at this time.  I would not make any changes at this time and we should check this again in 6 months.  Keep up the good work!    Resulted Orders   Hemoglobin A1c   Result Value Ref Range    Hemoglobin A1C 6.9 (H) 4.0 - 6.0 %       If you have any questions or concerns, please call the clinic at the number listed above.       Sincerely,        Sg Szymanski MD

## 2018-08-21 NOTE — NURSING NOTE
Patient presents today for 3 month diabetic check.    Previous A1C is not at goal of <8  Lab Results   Component Value Date    A1C 9.5 04/20/2018     Urine microalbumin:creatine:    Foot exam unknown  Eye exam 08/18    Tobacco User no  Patient is not on a daily aspirin  Patient is on a Statin.  Blood pressure today of: 162/106     BP Readings from Last 1 Encounters:   04/20/18 136/79      is not at the goal of <139/89 for diabetics.    Janeth Garcia LPN on 8/21/2018 at 11:09 AM

## 2018-08-21 NOTE — PROGRESS NOTES
SUBJECTIVE:  Charles Leopold Matter is a 43 year old male here for follow-up.  He has a history of poorly controlled diabetes.  At his last visit in April we added glipizide 10 mg daily.  He informed me however, he has been taking metformin only once a day instead twice a day as he was not aware he should be taken twice a day.  He does not check his blood sugars at home regularly.  He has had no hypoglycemia.  He has not lost any significant amount of weight.    He has a history of right carpal tunnel syndrome and needs a new brace that he uses at work.  His symptoms have been worsening recently however as he restarted using his brace has improved but his brace is starting to fall apart.      Patient Active Problem List    Diagnosis Date Noted     Type 2 diabetes mellitus without complication, without long-term current use of insulin (H) 04/20/2018     Priority: Medium     Morbid obesity (H) 04/20/2018     Priority: Medium     Pain in thoracic spine 01/17/2018     Priority: Medium     Degeneration of thoracic or thoracolumbar intervertebral disc 01/17/2018     Priority: Medium     Overview:   Thoracic degenerative spine disease/juvenile disc disease-Dr. Samir Acosta       Gastritis 01/17/2018     Priority: Medium     Overview:   with dysphagia       Obesity 01/17/2018     Priority: Medium     Scoliosis (and kyphoscoliosis), idiopathic 01/17/2018     Priority: Medium     Overview:   Dr.Scott Acosta       SCOTT (obstructive sleep apnea) 10/15/2015     Priority: Medium     Overview:   Moderate, sleep study        Suicidal ideation 07/30/2013     Priority: Medium     Hyperlipidemia, mild 02/17/2011     Priority: Medium     Depression, major 08/05/2010     Priority: Medium     Carpal tunnel syndrome, right 03/08/2010     Priority: Medium     Hypertension 01/27/2010     Priority: Medium     Gout 10/01/2005     Priority: Medium     Overview:   left great toe         Past Medical History:   Diagnosis Date      Encounter for screening for cardiovascular disorders     02-11, Negative stress test     Essential (primary) hypertension     No Comments Provided     Gastro-esophageal reflux disease without esophagitis     No Comments Provided     Major depressive disorder, single episode     After the death of his daughter in MVA     Mild intermittent asthma, uncomplicated     No Comments Provided     Nicotine dependence, uncomplicated     age [age], 1-2 packs per day, Quit Aug 2005     Suicidal ideations     2013,Hospitalized in [place]       Past Surgical History:   Procedure Laterality Date     LAPAROSCOPIC CHOLECYSTECTOMY      11-05,Dr. Dunne     TYMPANOSTOMY, LOCAL/TOPICAL ANESTHESIA      No Comments Provided       Current Outpatient Prescriptions   Medication Sig Dispense Refill     acetaminophen (TYLENOL) 650 MG CR tablet Take 2 tablets (1,300 mg) by mouth 3 times daily as needed 60 tablet 0     albuterol (PROAIR HFA, PROVENTIL HFA, VENTOLIN HFA) 108 (90 BASE) MCG/ACT inhaler Inhale 2 puffs into the lungs every 4 hours as needed (cough) 1 Inhaler 0     diclofenac (VOLTAREN) 75 MG EC tablet Take 1 tablet (75 mg) by mouth 2 times daily 60 tablet 0     FLUoxetine (PROZAC) 20 MG capsule TAKE ONE CAPSULE BY MOUTH ONCE DAILY, IN  ADDITION  TO  A  40MG  CAPSULE.  TOTAL  OF  60MG  DAILY.       FLUoxetine (PROZAC) 40 MG capsule TAKE ONE CAPSULE BY MOUTH ONCE DAILY IN  ADDITION  TO  20MG  TABLET.  TOTAL  60MG  DAILY.       fluticasone (FLONASE) 50 MCG/ACT nasal spray Spray 1 spray into both nostrils At Bedtime 1 Package 0     gabapentin (NEURONTIN) 300 MG capsule Take 600 mg by mouth 3 times daily       glipiZIDE (GLUCOTROL XL) 10 MG 24 hr tablet TAKE 1 TABLET BY MOUTH ONCE DAILY 30 tablet 1     lisinopril (PRINIVIL/ZESTRIL) 40 MG tablet Take 40 mg by mouth daily       meloxicam (MOBIC) 15 MG tablet Take 15 mg by mouth daily       metFORMIN (GLUCOPHAGE) 1000 MG tablet Take 1 tablet (1,000 mg) by mouth 2 times daily (with meals) 90  tablet 3     omeprazole (PRILOSEC) 40 MG capsule Take 1 capsule (40 mg) by mouth daily with food 90 capsule 3     order for DME CPAP at pressure: 8cm H2O Length of Need: Lifetime       order for DME C-PAP supplies and filters, Dx: SCOTT       simvastatin (ZOCOR) 20 MG tablet Take 20 mg by mouth At Bedtime         Allergies:  No Known Allergies    History reviewed. No pertinent family history.    Social History   Substance Use Topics     Smoking status: Former Smoker     Packs/day: 0.00     Years: 20.00     Types: Cigarettes     Quit date: 8/1/2013     Smokeless tobacco: Never Used     Alcohol use Yes      Comment: Alcoholic Drinks/day: occaisionally       ROS:    As above otherwise ROS is unremarkable.      OBJECTIVE:  /88  Pulse 64  Wt 258 lb 6.4 oz (117.2 kg)  BMI 37.08 kg/m2    EXAM:  General Appearance: Pleasant, alert, appropriate appearance for age. No acute distress  Lungs: Normal chest wall and respirations. Clear to auscultation, no wheezes or crackles.  Cardiovascular: Regular rate and rhythm. S1, S2, no murmurs.  Musculoskeletal: No edema.  Skin: no concerning or new rashes.  Neurologic Exam: CN 2-12 grossly intact.  Monofilament testing is normal.  Psychiatric Exam: Alert and oriented, appropriate affect.    ASSESSEMENT AND PLAN:    1. Type 2 diabetes mellitus without complication, without long-term current use of insulin (H)    2. Carpal tunnel syndrome, right    3. Essential hypertension      We will update hemoglobin A1c.  If this continues to be elevated will have him increase metformin to twice a day and recheck again in 3 months.    We will get him a new carpal tunnel brace.    Blood pressure was initially elevated but came down nicely during his stay.  I asked him to start checking his blood pressure at home on a more consistent basis.  We also discussed weight loss through diet and exercise.  We will continue monitoring this.    William Szymanski MD  Family Medicine      This document was  prepared using voice generated software.  While every attempt was made for accuracy, grammatical errors may exist.

## 2018-08-22 ASSESSMENT — PATIENT HEALTH QUESTIONNAIRE - PHQ9: SUM OF ALL RESPONSES TO PHQ QUESTIONS 1-9: 15

## 2018-09-04 DIAGNOSIS — E11.9 TYPE 2 DIABETES MELLITUS WITHOUT COMPLICATION, WITHOUT LONG-TERM CURRENT USE OF INSULIN (H): ICD-10-CM

## 2018-09-07 RX ORDER — GLIPIZIDE 10 MG/1
TABLET, EXTENDED RELEASE ORAL
Qty: 90 TABLET | Refills: 1 | Status: SHIPPED | OUTPATIENT
Start: 2018-09-07 | End: 2019-11-26

## 2018-09-07 NOTE — TELEPHONE ENCOUNTER
Last OV 08/21/18 with PCP. GLIPIZIDE XL 10MG TAB approved per [facility] Refill Protocol. Refill authorized for 90 days and 1 refill at this time.   Mariela Freed RN on 9/7/2018 at 11:37 AM

## 2018-11-27 ENCOUNTER — OFFICE VISIT (OUTPATIENT)
Dept: FAMILY MEDICINE | Facility: OTHER | Age: 43
End: 2018-11-27
Attending: FAMILY MEDICINE
Payer: COMMERCIAL

## 2018-11-27 VITALS
SYSTOLIC BLOOD PRESSURE: 136 MMHG | DIASTOLIC BLOOD PRESSURE: 84 MMHG | HEART RATE: 76 BPM | BODY MASS INDEX: 34.87 KG/M2 | WEIGHT: 243 LBS

## 2018-11-27 DIAGNOSIS — N50.82 PAIN IN SCROTUM: Primary | ICD-10-CM

## 2018-11-27 DIAGNOSIS — E11.9 TYPE 2 DIABETES MELLITUS WITHOUT COMPLICATION, WITHOUT LONG-TERM CURRENT USE OF INSULIN (H): ICD-10-CM

## 2018-11-27 DIAGNOSIS — R68.82 DECREASED LIBIDO: ICD-10-CM

## 2018-11-27 LAB
ALBUMIN UR-MCNC: NEGATIVE MG/DL
APPEARANCE UR: CLEAR
BACTERIA #/AREA URNS HPF: ABNORMAL /HPF
BILIRUB UR QL STRIP: NEGATIVE
COLOR UR AUTO: YELLOW
GLUCOSE UR STRIP-MCNC: >1000 MG/DL
HBA1C MFR BLD: 11.4 % (ref 4–6)
HGB UR QL STRIP: NEGATIVE
KETONES UR STRIP-MCNC: NEGATIVE MG/DL
LEUKOCYTE ESTERASE UR QL STRIP: NEGATIVE
NITRATE UR QL: NEGATIVE
NON-SQ EPI CELLS #/AREA URNS LPF: ABNORMAL /LPF
PH UR STRIP: 5 PH (ref 5–9)
RBC #/AREA URNS AUTO: ABNORMAL /HPF
SOURCE: ABNORMAL
SP GR UR STRIP: 1.01 (ref 1–1.03)
TESTOST SERPL-MCNC: 194 NG/DL (ref 175–781)
UROBILINOGEN UR STRIP-ACNC: 0.2 EU/DL (ref 0.2–1)
WBC #/AREA URNS AUTO: ABNORMAL /HPF

## 2018-11-27 PROCEDURE — 36415 COLL VENOUS BLD VENIPUNCTURE: CPT | Performed by: FAMILY MEDICINE

## 2018-11-27 PROCEDURE — 81001 URINALYSIS AUTO W/SCOPE: CPT | Performed by: FAMILY MEDICINE

## 2018-11-27 PROCEDURE — G0463 HOSPITAL OUTPT CLINIC VISIT: HCPCS

## 2018-11-27 PROCEDURE — 83036 HEMOGLOBIN GLYCOSYLATED A1C: CPT | Performed by: FAMILY MEDICINE

## 2018-11-27 PROCEDURE — 99214 OFFICE O/P EST MOD 30 MIN: CPT | Performed by: FAMILY MEDICINE

## 2018-11-27 PROCEDURE — 84403 ASSAY OF TOTAL TESTOSTERONE: CPT | Performed by: FAMILY MEDICINE

## 2018-11-27 ASSESSMENT — PAIN SCALES - GENERAL: PAINLEVEL: SEVERE PAIN (7)

## 2018-11-27 NOTE — MR AVS SNAPSHOT
"              After Visit Summary   11/27/2018    Charles Leopold Matter    MRN: [id number]           Patient Information     Date Of Birth          1975        Visit Information        Provider Department      11/27/2018 4:30 PM Sg Szymanski MD [facility]        Today's Diagnoses     Pain in scrotum    -  1    Type 2 diabetes mellitus without complication, without long-term current use of insulin (H)        Decreased libido           Follow-ups after your visit        Future tests that were ordered for you today     Open Future Orders        Priority Expected Expires Ordered    US Testicular & Scrotum w Doppler Ltd Routine  11/27/2019 11/27/2018            Who to contact     If you have questions or need follow up information about today's clinic visit or your schedule please contact [facility] directly at [phone].  Normal or non-critical lab and imaging results will be communicated to you by ASOCShart, letter or phone within 4 business days after the clinic has received the results. If you do not hear from us within 7 days, please contact the clinic through ASOCShart or phone. If you have a critical or abnormal lab result, we will notify you by phone as soon as possible.  Submit refill requests through Second[employer] or call your pharmacy and they will forward the refill request to us. Please allow 3 business days for your refill to be completed.          Additional Information About Your Visit        ASOCShart Information     Second[employer] lets you send messages to your doctor, view your test results, renew your prescriptions, schedule appointments and more. To sign up, go to [email or web address] . Click on \"Log in\" on the left side of the screen, which will take you to the Welcome page. Then click on \"Sign up Now\" on the right side of the page.     You will be asked to enter the access code listed below, as well as some personal information. Please follow the directions " to create your username and password.     Your access code is: [email or web address]  Expires: 2019  4:45 PM     Your access code will  in 90 days. If you need help or a new code, please call your [facility] or [phone].        Care EveryWhere ID     This is your Care EveryWhere ID. This could be used by other organizations to access your [facility] medical records  [id number]        Your Vitals Were     Pulse BMI (Body Mass Index)                76 34.87 kg/m2           Blood Pressure from Last 3 Encounters:   18 136/84   18 138/88   18 136/79    Weight from Last 3 Encounters:   18 243 lb (110.2 kg)   18 258 lb 6.4 oz (117.2 kg)   18 253 lb (114.8 kg)              We Performed the Following     Hemoglobin A1c     Testosterone, Total     UA reflex to Microscopic and Culture        Primary Care Provider Office Phone # Fax #    Sg Szymanski [phone] [address] [facility] [address]        Equal Access to Services     [facility]: Hadii aad ku hadasho Somary, waaxda luqadaha, qaybta kaalmada todd, allie rossi . So [facility] [phone].    ATENCIÓN: Si habla español, tiene a liu disposición servicios gratuitos de asistencia lingüística. Llame al [phone].    We comply with applicable federal civil rights laws and [place] laws. We do not discriminate on the basis of race, color, national origin, age, disability, sex, sexual orientation, or gender identity.            Thank you!     Thank you for choosing [facility]  for your care. Our goal is always to provide you with excellent care. Hearing back from our patients is one way we can continue to improve our services. Please take a few minutes to complete the written survey that you may receive in the mail after your visit with us. Thank you!             Your Updated Medication List - Protect others around you: Learn how to  safely use, store and throw away your medicines at www.disposemymeds.org.          This list is accurate as of 11/27/18  4:45 PM.  Always use your most recent med list.                   Brand Name Dispense Instructions for use Diagnosis    acetaminophen 650 MG CR tablet    TYLENOL    60 tablet    Take 2 tablets (1,300 mg) by mouth 3 times daily as needed    Suicidal ideation       diclofenac 75 MG EC tablet    VOLTAREN    60 tablet    Take 1 tablet (75 mg) by mouth 2 times daily    Suicidal ideation       * FLUoxetine 20 MG capsule    PROzac     TAKE ONE CAPSULE BY MOUTH ONCE DAILY, IN  ADDITION  TO  A  40MG  CAPSULE.  TOTAL  OF  60MG  DAILY.        * FLUoxetine 40 MG capsule    PROzac     TAKE ONE CAPSULE BY MOUTH ONCE DAILY IN  ADDITION  TO  20MG  TABLET.  TOTAL  60MG  DAILY.        fluticasone 50 MCG/ACT nasal spray    FLONASE    1 Package    Spray 1 spray into both nostrils At Bedtime    Suicidal ideation       gabapentin 300 MG capsule    NEURONTIN     Take 600 mg by mouth 3 times daily        glipiZIDE XL 10 MG 24 hr tablet   Generic drug:  glipiZIDE     90 tablet    TAKE 1 TABLET BY MOUTH ONCE DAILY    Type 2 diabetes mellitus without complication, without long-term current use of insulin (H)       lisinopril 40 MG tablet    PRINIVIL/ZESTRIL     Take 40 mg by mouth daily        meloxicam 15 MG tablet    MOBIC     Take 15 mg by mouth daily        metFORMIN 1000 MG tablet    GLUCOPHAGE    90 tablet    Take 1 tablet (1,000 mg) by mouth 2 times daily (with meals)    Type 2 diabetes mellitus without complication, without long-term current use of insulin (H)       omeprazole 40 MG DR capsule    priLOSEC    90 capsule    Take 1 capsule (40 mg) by mouth daily with food    Type 2 diabetes mellitus without complication, without long-term current use of insulin (H)       order for DME      CPAP at pressure: 8cm H2O Length of Need: Lifetime        order for DME      C-PAP supplies and filters, Dx: SCOTT        order for  DME     1 each    Equipment being ordered: wrist brace    Carpal tunnel syndrome, right       simvastatin 20 MG tablet    ZOCOR     Take 20 mg by mouth At Bedtime        * Notice:  This list has 2 medication(s) that are the same as other medications prescribed for you. Read the directions carefully, and ask your doctor or other care provider to review them with you.

## 2018-11-27 NOTE — NURSING NOTE
Patient presents today for groin pain on his right side x2 weeks. Patient complains he cant seem to drink enough water, and he is going to the bathroom every hour.     Medication Reconciliation Complete    Janeth Garcia LPN  11/27/2018 4:26 PM

## 2018-11-27 NOTE — PROGRESS NOTES
SUBJECTIVE:  Charles Leopold Matter is a 43 year old male here for follow-up.  He has developed right testicular and scrotal pain over the last couple of weeks.  He describes pain just above his testicle.  He has pain with erections.  He has had no hematuria or pyuria.      He has had increased urinary frequency and occasional sense of urge.  When he gets the urge to urinate he does not have very much time before he needs to go to the bathroom.  No concerns about STDs.    He has a history of type 2 diabetes and continues on glipizide and metformin.  He does not check his blood sugars at home but his most recent hemoglobin A1c in August was 6.9%.    Finally, he reports that his sex drive and libido is significantly down.  He also has difficulty with maintaining erections.  He has had quite a bit of fatigue recently.      Patient Active Problem List    Diagnosis Date Noted     Type 2 diabetes mellitus without complication, without long-term current use of insulin (H) 04/20/2018     Priority: Medium     Morbid obesity (H) 04/20/2018     Priority: Medium     Pain in thoracic spine 01/17/2018     Priority: Medium     Degeneration of thoracic or thoracolumbar intervertebral disc 01/17/2018     Priority: Medium     Overview:   Thoracic degenerative spine disease/juvenile disc disease-Dr. Samir Acosta       Gastritis 01/17/2018     Priority: Medium     Overview:   with dysphagia       Obesity 01/17/2018     Priority: Medium     Scoliosis (and kyphoscoliosis), idiopathic 01/17/2018     Priority: Medium     Overview:   Dr.Scott Acosta       SCOTT (obstructive sleep apnea) 10/15/2015     Priority: Medium     Overview:   Moderate, sleep study        Suicidal ideation 07/30/2013     Priority: Medium     Hyperlipidemia, mild 02/17/2011     Priority: Medium     Depression, major 08/05/2010     Priority: Medium     Carpal tunnel syndrome, right 03/08/2010     Priority: Medium     Hypertension 01/27/2010     Priority: Medium      Gout 10/01/2005     Priority: Medium     Overview:   left great toe         Past Medical History:   Diagnosis Date     Encounter for screening for cardiovascular disorders     02-11, Negative stress test     Essential (primary) hypertension     No Comments Provided     Gastro-esophageal reflux disease without esophagitis     No Comments Provided     Major depressive disorder, single episode     After the death of his daughter in MVA     Mild intermittent asthma, uncomplicated     No Comments Provided     Nicotine dependence, uncomplicated     age [age], 1-2 packs per day, Quit Aug 2005     Suicidal ideations     2013,Hospitalized in [place]       Past Surgical History:   Procedure Laterality Date     LAPAROSCOPIC CHOLECYSTECTOMY      11-05,Dr. Dunne     TYMPANOSTOMY, LOCAL/TOPICAL ANESTHESIA      No Comments Provided       Current Outpatient Prescriptions   Medication Sig Dispense Refill     acetaminophen (TYLENOL) 650 MG CR tablet Take 2 tablets (1,300 mg) by mouth 3 times daily as needed 60 tablet 0     diclofenac (VOLTAREN) 75 MG EC tablet Take 1 tablet (75 mg) by mouth 2 times daily 60 tablet 0     FLUoxetine (PROZAC) 20 MG capsule TAKE ONE CAPSULE BY MOUTH ONCE DAILY, IN  ADDITION  TO  A  40MG  CAPSULE.  TOTAL  OF  60MG  DAILY.       FLUoxetine (PROZAC) 40 MG capsule TAKE ONE CAPSULE BY MOUTH ONCE DAILY IN  ADDITION  TO  20MG  TABLET.  TOTAL  60MG  DAILY.       fluticasone (FLONASE) 50 MCG/ACT nasal spray Spray 1 spray into both nostrils At Bedtime 1 Package 0     gabapentin (NEURONTIN) 300 MG capsule Take 600 mg by mouth 3 times daily       GLIPIZIDE XL 10 MG 24 hr tablet TAKE 1 TABLET BY MOUTH ONCE DAILY 90 tablet 1     lisinopril (PRINIVIL/ZESTRIL) 40 MG tablet Take 40 mg by mouth daily       meloxicam (MOBIC) 15 MG tablet Take 15 mg by mouth daily       metFORMIN (GLUCOPHAGE) 1000 MG tablet Take 1 tablet (1,000 mg) by mouth 2 times daily (with meals) 90 tablet 3     omeprazole (PRILOSEC) 40 MG capsule  Take 1 capsule (40 mg) by mouth daily with food 90 capsule 3     order for DME Equipment being ordered: wrist brace 1 each 0     order for DME CPAP at pressure: 8cm H2O Length of Need: Lifetime       order for DME C-PAP supplies and filters, Dx: SCOTT       simvastatin (ZOCOR) 20 MG tablet Take 20 mg by mouth At Bedtime         Allergies:  No Known Allergies    History reviewed. No pertinent family history.    Social History   Substance Use Topics     Smoking status: Former Smoker     Packs/day: 0.00     Years: 20.00     Types: Cigarettes     Quit date: 8/1/2013     Smokeless tobacco: Never Used     Alcohol use Yes      Comment: Alcoholic Drinks/day: occaisionally       ROS:    As above otherwise ROS is unremarkable.      OBJECTIVE:  /84  Pulse 76  Wt 243 lb (110.2 kg)  BMI 34.87 kg/m2    EXAM:  General Appearance: Pleasant, alert, appropriate appearance for age. No acute distress  : Right testicle seems to be normal in size.  He has tenderness along his right vas, no nodules or palpable abnormalities are noted.    ASSESSEMENT AND PLAN:    1. Pain in scrotum    2. Type 2 diabetes mellitus without complication, without long-term current use of insulin (H)    3. Decreased libido      Suspect that he may have a varicocele.  We will proceed with an ultrasound to evaluate this further.    Given his urinary changes I am worried about the possibility of diabetes being out of control so we will repeat his hemoglobin A1c.  If this is normal he may have overactive bladder.  I would leave it up to him if he wants to try treatment for this.  We will also check urinalysis.    For his decreased libido we will check a testosterone level.    William Szymanski MD  Family Medicine      This document was prepared using voice generated software.  While every attempt was made for accuracy, grammatical errors may exist.

## 2018-11-28 ENCOUNTER — HOSPITAL ENCOUNTER (OUTPATIENT)
Dept: ULTRASOUND IMAGING | Facility: OTHER | Age: 43
Discharge: HOME | End: 2018-11-28
Attending: FAMILY MEDICINE | Admitting: FAMILY MEDICINE
Payer: COMMERCIAL

## 2018-11-28 DIAGNOSIS — N50.82 PAIN IN SCROTUM: ICD-10-CM

## 2018-11-28 PROCEDURE — 76870 US EXAM SCROTUM: CPT

## 2018-12-08 DIAGNOSIS — F33.1 MODERATE RECURRENT MAJOR DEPRESSION (H): ICD-10-CM

## 2018-12-08 DIAGNOSIS — E78.5 HYPERLIPIDEMIA, MILD: Primary | ICD-10-CM

## 2018-12-08 DIAGNOSIS — F32.9 DEPRESSION, MAJOR: ICD-10-CM

## 2018-12-11 RX ORDER — SIMVASTATIN 20 MG
TABLET ORAL
Qty: 90 TABLET | Refills: 0 | Status: SHIPPED | OUTPATIENT
Start: 2018-12-11 | End: 2019-03-14

## 2018-12-11 RX ORDER — FLUOXETINE 40 MG/1
CAPSULE ORAL
Qty: 90 CAPSULE | Refills: 0 | Status: SHIPPED | OUTPATIENT
Start: 2018-12-11 | End: 2019-03-14

## 2018-12-11 NOTE — TELEPHONE ENCOUNTER
In clinical absence of patient's primary, Sg Szymanski, patient is requesting that this message be sent to the primary provider's Teamlet for consideration please.  DWS to return Friday 12/14.    [facility] sent Rx request for the following:     FLUOXETINE 20MG     CAP  Sig: TAKE ONE CAPSULE BY MOUTH ONCE DAILY IN ADDITION  TO  A  40MG  CAPSULE.  TOTAL  DAILY DOSE  OF  60MG  Last Written Prescription Date:  11/14/17  Last Fill Quantity: 90,   # refills: 3    Routing refill request to provider for review/approval because:   Drug-Drug: FLUoxetine and diclofenac, meloxicam   Toxic effects may be increased with concurrent administration of NSAIDs and Selective Serotonin Reuptake Inhibitors. The risk of upper gastrointestinal bleeding may be increased. Patients taking both drugs concurrently should be educated about the signs and symptoms of GI bleeding.     FLUoxetine 40MG     CAP  Sig: TAKE ONE CAPSULE BY MOUTH ONCE DAILY IN ADDITION  TO  20MG  CAPSULE.  TOTAL  DAILY DOSE OF  60MG  Last Written Prescription Date:  11/14/17  Last Fill Quantity: 90,   # refills: 3     Routing refill request to provider for review/approval because:  See above    SIMVASTATIN 20MG  TAB  Sig: TAKE ONE TABLET BY MOUTH AT BEDTIME  Last Written Prescription Date:  11/14/17  Last Fill Quantity: 90,   # refills: 3     Routing refill request to provider for review/approval because:  Statins Protocol [id number]/11 1:06 PM   LDL on file in past 12 months     Last Office Visit: 11/27/18  Future Office visit: None.    Fasting lipid panel pending MD rogers. If approved, please notify Patient so they can schedule lab only appointment. Unable to complete prescription refill per RN Medication Refill Policy. Stefanie Mae RN .............. 12/11/2018  1:12 PM

## 2019-01-05 DIAGNOSIS — M54.50 LOW BACK PAIN WITHOUT SCIATICA, UNSPECIFIED BACK PAIN LATERALITY, UNSPECIFIED CHRONICITY: ICD-10-CM

## 2019-01-05 DIAGNOSIS — E78.5 HYPERLIPIDEMIA, MILD: Primary | ICD-10-CM

## 2019-01-05 DIAGNOSIS — I10 ESSENTIAL HYPERTENSION: ICD-10-CM

## 2019-01-08 RX ORDER — GABAPENTIN 300 MG/1
CAPSULE ORAL
Qty: 180 CAPSULE | Refills: 11 | Status: SHIPPED | OUTPATIENT
Start: 2019-01-08 | End: 2019-11-26

## 2019-01-08 RX ORDER — LISINOPRIL 40 MG/1
TABLET ORAL
Qty: 90 TABLET | Refills: 3 | Status: SHIPPED | OUTPATIENT
Start: 2019-01-08 | End: 2019-11-26

## 2019-01-08 NOTE — TELEPHONE ENCOUNTER
GABAPENTIN 300MG    CAP      Last Written Prescription Date:  11/14/2017  Last Fill Quantity: 540,   # refills: 3  Last Office Visit: 11/27/18  Future Office visit:  None scheduled  Routing refill request to provider for review/approval because:  Drug not on the [facility], [facility] or [facility] refill protocol or controlled substance. Please review, fill, and sign as appropriate. Thank you!    LISINOPRIL 40MG     TAB      Last Written Prescription Date:  11/14/2017  Last Fill Quantity: 90,   # refills: 3  Last Office Visit: 11/27/18  Future Office visit: None scheduled   Routing refill request to provider for review/approval because:  ACE Inhibitors (Including Combos) Protocol Failed1/5 9:47 AM   No normal serum creatinine on file in past 12 months    No normal serum potassium on file in past 12 months   Unable to fill medication because protocol failed. Please review, fill, and sign as appropriate. Thank you!  Mariela Freed RN on 1/8/2019 at 11:27 AM

## 2019-02-12 DIAGNOSIS — M25.531 PAIN OF BOTH WRIST JOINTS: Primary | ICD-10-CM

## 2019-02-12 DIAGNOSIS — M25.532 PAIN OF BOTH WRIST JOINTS: Primary | ICD-10-CM

## 2019-02-14 PROBLEM — M25.531 PAIN OF BOTH WRIST JOINTS: Status: ACTIVE | Noted: 2019-02-14

## 2019-02-14 PROBLEM — M25.532 PAIN OF BOTH WRIST JOINTS: Status: ACTIVE | Noted: 2019-02-14

## 2019-02-14 RX ORDER — MELOXICAM 15 MG/1
TABLET ORAL
Qty: 90 TABLET | Refills: 3 | Status: SHIPPED | OUTPATIENT
Start: 2019-02-14 | End: 2019-11-26

## 2019-02-14 NOTE — TELEPHONE ENCOUNTER
[facility] sent Rx request for the following:      MELOXICAM 15MG      TAB  Sig: TAKE ONE TABLET BY MOUTH ONCE DAILY  Last Written Prescription Date:  11/14/17  Last Fill Quantity: 90,   # refills: 3    Last Office Visit: 11/27/18  Future Office visit: None.    Routing refill request to provider for review/approval because:  NSAID Medications Failed2/14 10:49 AM   Normal ALT on file in past 12 months    Normal AST on file in past 12 months    Normal CBC on file in past 12 months    Normal serum creatinine on file in past 12 months     CMP ordered on 1/8/19.    Unable to complete prescription refill per RN Medication Refill Policy. Stefanie Mae RN .............. 2/14/2019  10:53 AM

## 2019-03-14 DIAGNOSIS — F33.1 MODERATE RECURRENT MAJOR DEPRESSION (H): ICD-10-CM

## 2019-03-14 DIAGNOSIS — E78.5 HYPERLIPIDEMIA, MILD: ICD-10-CM

## 2019-03-14 NOTE — LETTER
March 18, 2019      Herb Leopold Matter  [address] [facility] [address]        Dear Herb,     A refill of FLUoxetine (PROZAC) 40 MG capsule, FLUoxetine (PROZAC) 20 MG capsule, and simvastatin (ZOCOR) 20 MG tablet have been requested by your pharmacy.  We noticed that you are due for diabetic follow-up and labs with Sg Szymanski MD.  A limited 30 day supply of each medication has been sent to your pharmacy at this time.    Additional refills require a medication management appointment.  Your health is very important to us.  Please call the clinic at [phone] to schedule your appointment.    Thank you,    The Refill Nurse  [facility]

## 2019-03-18 RX ORDER — SIMVASTATIN 20 MG
TABLET ORAL
Qty: 30 TABLET | Refills: 0 | Status: SHIPPED | OUTPATIENT
Start: 2019-03-18 | End: 2019-11-26

## 2019-03-18 RX ORDER — FLUOXETINE 40 MG/1
CAPSULE ORAL
Qty: 30 CAPSULE | Refills: 0 | Status: SHIPPED | OUTPATIENT
Start: 2019-03-18 | End: 2019-11-26

## 2019-03-18 NOTE — TELEPHONE ENCOUNTER
"Refill request from [facility] for:  FLUoxetine (PROZAC) 40 MG capsule  FLUoxetine (PROZAC) 20 MG capsule  simvastatin (ZOCOR) 20 MG tablet    LOV 11/27/2018 with PCP   No changes noted to requested medications at this time    Pt is due for labs and f/u appt-lab result note 11/27/2018 (A1c)    No upcoming appt noted at this time    Will provide keri refill, send reminder letter, add note to Rx.      Requested Prescriptions   Pending Prescriptions Disp Refills     FLUoxetine (PROZAC) 40 MG capsule [Pharmacy Med Name: FLUoxetine 40MG     CAP] 90 capsule 0     Sig: TAKE 1 CAPSULE BY MOUTH ONCE DAILY IN  ADDITION  TO  20MG  CAPSULE.  TOTAL  DOSE  OF  60MG.    SSRIs Protocol Failed - 3/18/2019 10:11 AM       Failed - PHQ-9 score less than 5 in past 6 months    Please review last PHQ-9 score.          Passed - Medication is active on med list       Passed - Patient is age 18 or older       Passed - Recent (6 mo) or future (30 days) visit within the authorizing provider's specialty    Patient had office visit in the last 6 months or has a visit in the next 30 days with authorizing provider or within the authorizing provider's specialty.  See \"Patient Info\" tab in inbasket, or \"Choose Columns\" in Meds & Orders section of the refill encounter.            FLUoxetine (PROZAC) 20 MG capsule [Pharmacy Med Name: FLUOXETINE 20MG     CAP] 90 capsule 0     Sig: TAKE 1 CAPSULE BY MOUTH ONCE DAILY IN  ADDITION  TO  A  40MG  CAPSULE  .  TOTAL  DAILY  DOSE  OF  60MG    SSRIs Protocol Failed - 3/14/2019  5:21 PM       Failed - PHQ-9 score less than 5 in past 6 months    Please review last PHQ-9 score.          Passed - Medication is active on med list       Passed - Patient is age 18 or older       Passed - Recent (6 mo) or future (30 days) visit within the authorizing provider's specialty    Patient had office visit in the last 6 months or has a visit in the next 30 days with authorizing provider or within the authorizing provider's " "specialty.  See \"Patient Info\" tab in inbasket, or \"Choose Columns\" in Meds & Orders section of the refill encounter.            simvastatin (ZOCOR) 20 MG tablet [Pharmacy Med Name: SIMVASTATIN 20MG  TAB] 90 tablet 0     Sig: TAKE 1 TABLET BY MOUTH AT BEDTIME    Statins Protocol Failed - 3/14/2019  5:21 PM       Failed - LDL on file in past 12 months    Recent Labs   Lab Test 11/14/17  0952   *            Passed - No abnormal creatine kinase in past 12 months    No lab results found.            Passed - Recent (12 mo) or future (30 days) visit within the authorizing provider's specialty    Patient had office visit in the last 12 months or has a visit in the next 30 days with authorizing provider or within the authorizing provider's specialty.  See \"Patient Info\" tab in inbasket, or \"Choose Columns\" in Meds & Orders section of the refill encounter.             Passed - Medication is active on med list       Passed - Patient is age 18 or older        Aurora Dickens RN  ....................  3/18/2019   10:23 AM      "

## 2019-11-26 ENCOUNTER — OFFICE VISIT (OUTPATIENT)
Dept: FAMILY MEDICINE | Facility: OTHER | Age: 44
End: 2019-11-26
Attending: FAMILY MEDICINE
Payer: COMMERCIAL

## 2019-11-26 VITALS
HEART RATE: 74 BPM | BODY MASS INDEX: 34.36 KG/M2 | RESPIRATION RATE: 16 BRPM | DIASTOLIC BLOOD PRESSURE: 114 MMHG | OXYGEN SATURATION: 99 % | SYSTOLIC BLOOD PRESSURE: 117 MMHG | WEIGHT: 240 LBS | HEIGHT: 70 IN | TEMPERATURE: 97 F

## 2019-11-26 DIAGNOSIS — M54.50 LOW BACK PAIN WITHOUT SCIATICA, UNSPECIFIED BACK PAIN LATERALITY, UNSPECIFIED CHRONICITY: ICD-10-CM

## 2019-11-26 DIAGNOSIS — E11.9 TYPE 2 DIABETES MELLITUS WITHOUT COMPLICATION, WITHOUT LONG-TERM CURRENT USE OF INSULIN (H): Primary | ICD-10-CM

## 2019-11-26 DIAGNOSIS — I10 ESSENTIAL HYPERTENSION: ICD-10-CM

## 2019-11-26 DIAGNOSIS — M25.531 PAIN OF BOTH WRIST JOINTS: ICD-10-CM

## 2019-11-26 DIAGNOSIS — E78.5 HYPERLIPIDEMIA, MILD: ICD-10-CM

## 2019-11-26 DIAGNOSIS — F33.1 MODERATE RECURRENT MAJOR DEPRESSION (H): ICD-10-CM

## 2019-11-26 DIAGNOSIS — F32.1 CURRENT MODERATE EPISODE OF MAJOR DEPRESSIVE DISORDER WITHOUT PRIOR EPISODE (H): ICD-10-CM

## 2019-11-26 DIAGNOSIS — G47.33 OSA (OBSTRUCTIVE SLEEP APNEA): ICD-10-CM

## 2019-11-26 DIAGNOSIS — M25.532 PAIN OF BOTH WRIST JOINTS: ICD-10-CM

## 2019-11-26 PROCEDURE — 99214 OFFICE O/P EST MOD 30 MIN: CPT | Performed by: FAMILY MEDICINE

## 2019-11-26 RX ORDER — OMEPRAZOLE 40 MG/1
40 CAPSULE, DELAYED RELEASE ORAL
Qty: 90 CAPSULE | Refills: 3 | Status: SHIPPED | OUTPATIENT
Start: 2019-11-26 | End: 2020-12-07

## 2019-11-26 RX ORDER — GABAPENTIN 300 MG/1
CAPSULE ORAL
Qty: 180 CAPSULE | Refills: 11 | Status: SHIPPED | OUTPATIENT
Start: 2019-11-26 | End: 2021-01-11

## 2019-11-26 RX ORDER — MELOXICAM 15 MG/1
15 TABLET ORAL DAILY
Qty: 90 TABLET | Refills: 3 | Status: SHIPPED | OUTPATIENT
Start: 2019-11-26 | End: 2020-12-07

## 2019-11-26 RX ORDER — LISINOPRIL 40 MG/1
40 TABLET ORAL DAILY
Qty: 90 TABLET | Refills: 3 | Status: SHIPPED | OUTPATIENT
Start: 2019-11-26 | End: 2020-12-21

## 2019-11-26 RX ORDER — SIMVASTATIN 20 MG
20 TABLET ORAL AT BEDTIME
Qty: 90 TABLET | Refills: 3 | Status: SHIPPED | OUTPATIENT
Start: 2019-11-26 | End: 2020-08-27

## 2019-11-26 RX ORDER — FLUOXETINE 40 MG/1
CAPSULE ORAL
Qty: 90 CAPSULE | Refills: 3 | Status: SHIPPED | OUTPATIENT
Start: 2019-11-26 | End: 2020-08-18

## 2019-11-26 RX ORDER — GLIPIZIDE 10 MG/1
10 TABLET, FILM COATED, EXTENDED RELEASE ORAL DAILY
Qty: 90 TABLET | Refills: 3 | Status: SHIPPED | OUTPATIENT
Start: 2019-11-26 | End: 2020-12-07

## 2019-11-26 ASSESSMENT — MIFFLIN-ST. JEOR: SCORE: 1974.88

## 2019-11-26 ASSESSMENT — PAIN SCALES - GENERAL: PAINLEVEL: MODERATE PAIN (4)

## 2019-11-26 ASSESSMENT — PATIENT HEALTH QUESTIONNAIRE - PHQ9: SUM OF ALL RESPONSES TO PHQ QUESTIONS 1-9: 15

## 2019-11-26 NOTE — NURSING NOTE
Patient presents today for diabetic check up.  Previous A1C is not at goal of <8  Lab Results   Component Value Date    A1C 11.4 11/27/2018    A1C 6.9 08/21/2018    A1C 9.5 04/20/2018     Urine microalbumin:creatine:    Foot exam 08/21/18  Eye exam due    Tobacco User no  Patient is not on a daily aspirin  Patient is on a Statin.  Blood pressure today of:     BP Readings from Last 1 Encounters:   11/27/18 136/84      is at the goal of <139/89 for diabetics.    Janeth Garcia LPN on 11/26/2019 at 1:45 PM      Medication Reconciliation Complete

## 2019-11-26 NOTE — PROGRESS NOTES
SUBJECTIVE:  Charles Leopold Matter is a 44 year old male here for follow-up.  He has a history of poorly controlled type 2 diabetes, bilateral wrist pain, depression all of which have been unstable recently.  He reports he has been off all of his medications since July when he lost his insurance.  Recently has had increased stress at home as his significant other moved out and he is currently living by himself.  He does not check his blood sugars at home.      Patient Active Problem List    Diagnosis Date Noted     Pain of both wrist joints 02/14/2019     Priority: Medium     Type 2 diabetes mellitus without complication, without long-term current use of insulin (H) 04/20/2018     Priority: Medium     Morbid obesity (H) 04/20/2018     Priority: Medium     Pain in thoracic spine 01/17/2018     Priority: Medium     Degeneration of thoracic or thoracolumbar intervertebral disc 01/17/2018     Priority: Medium     Overview:   Thoracic degenerative spine disease/juvenile disc disease-Dr. Samir Acosta       Gastritis 01/17/2018     Priority: Medium     Overview:   with dysphagia       Obesity 01/17/2018     Priority: Medium     Scoliosis (and kyphoscoliosis), idiopathic 01/17/2018     Priority: Medium     Overview:   Dr.Scott Acosta       SCOTT (obstructive sleep apnea) 10/15/2015     Priority: Medium     Overview:   Moderate, sleep study        Suicidal ideation 07/30/2013     Priority: Medium     Hyperlipidemia, mild 02/17/2011     Priority: Medium     Depression, major 08/05/2010     Priority: Medium     Carpal tunnel syndrome, right 03/08/2010     Priority: Medium     Essential hypertension 01/27/2010     Priority: Medium     Gout 10/01/2005     Priority: Medium     Overview:   left great toe         Past Medical History:   Diagnosis Date     Encounter for screening for cardiovascular disorders     02-11, Negative stress test     Essential (primary) hypertension     No Comments Provided     Gastro-esophageal  reflux disease without esophagitis     No Comments Provided     Major depressive disorder, single episode     After the death of his daughter in MVA     Mild intermittent asthma, uncomplicated     No Comments Provided     Nicotine dependence, uncomplicated     age [age], 1-2 packs per day, Quit Aug 2005     Suicidal ideations     2013,Hospitalized in [place]       Past Surgical History:   Procedure Laterality Date     LAPAROSCOPIC CHOLECYSTECTOMY      11-05,Dr. Dunne     TYMPANOSTOMY, LOCAL/TOPICAL ANESTHESIA      No Comments Provided       Current Outpatient Medications   Medication Sig Dispense Refill     acetaminophen (TYLENOL) 650 MG CR tablet Take 2 tablets (1,300 mg) by mouth 3 times daily as needed 60 tablet 0     FLUoxetine (PROZAC) 20 MG capsule TAKE 1 CAPSULE BY MOUTH ONCE DAILY IN  ADDITION  TO  A  40MG  CAPSULE  .  TOTAL  DAILY  DOSE  OF  60MG 90 capsule 3     FLUoxetine (PROZAC) 40 MG capsule TAKE 1 CAPSULE BY MOUTH ONCE DAILY IN  ADDITION  TO  20MG  CAPSULE.  TOTAL  DOSE  OF  60MG. 90 capsule 3     gabapentin (NEURONTIN) 300 MG capsule TAKE TWO CAPSULES BY MOUTH THREE TIMES DAILY 180 capsule 11     glipiZIDE (GLIPIZIDE XL) 10 MG 24 hr tablet Take 1 tablet (10 mg) by mouth daily 90 tablet 3     lisinopril (PRINIVIL/ZESTRIL) 40 MG tablet Take 1 tablet (40 mg) by mouth daily 90 tablet 3     meloxicam (MOBIC) 15 MG tablet Take 1 tablet (15 mg) by mouth daily 90 tablet 3     metFORMIN (GLUCOPHAGE) 1000 MG tablet Take 1 tablet (1,000 mg) by mouth 2 times daily (with meals) 90 tablet 3     omeprazole (PRILOSEC) 40 MG DR capsule Take 1 capsule (40 mg) by mouth daily with food 90 capsule 3     simvastatin (ZOCOR) 20 MG tablet Take 1 tablet (20 mg) by mouth At Bedtime 90 tablet 3       Allergies:  No Known Allergies    No family history on file.    Social History     Tobacco Use     Smoking status: Former Smoker     Packs/day: 0.00     Years: 20.00     Pack years: 0.00     Types: Cigarettes     Last attempt to  "quit: 2013     Years since quittin.3     Smokeless tobacco: Never Used   Substance Use Topics     Alcohol use: Yes     Comment: Alcoholic Drinks/day: occaisionally     Drug use: Never     Types: Other     Comment: Drug use: No       ROS:    As above otherwise ROS is unremarkable.      OBJECTIVE:  BP (!) 117/114   Pulse 74   Temp 97  F (36.1  C)   Resp 16   Ht 1.778 m (5' 10\")   Wt 108.9 kg (240 lb)   SpO2 99%   BMI 34.44 kg/m      EXAM:  General Appearance: Pleasant, alert, appropriate appearance for age. No acute distress  Head: Normal. Normocephalic, atraumatic.  Lungs: Normal chest wall and respirations. Clear to auscultation, no wheezes or crackles.  Cardiovascular: Regular rate and rhythm. S1, S2, no murmurs.  Gastrointestinal: Soft, nontender, no abnormal masses or organomegaly. BS normal.  Musculoskeletal: Both wrists have braces in place.  Skin: no concerning or new rashes.  Neurologic Exam: CN 2-12 grossly intact.  Normal gait.   Psychiatric Exam: Alert and oriented, appropriate affect.    ASSESSEMENT AND PLAN:    1. Type 2 diabetes mellitus without complication, without long-term current use of insulin (H)    2. Hyperlipidemia, mild    3. Essential hypertension    4. Current moderate episode of major depressive disorder without prior episode (H)    5. SCOTT (obstructive sleep apnea)    6. Low back pain without sciatica, unspecified back pain laterality, unspecified chronicity    7. Pain of both wrist joints    8. Moderate recurrent major depression (H)      All of his medications were filled once again.  He can start all of them as he was taking before with exception of Prozac.  He will start 20 mg daily for 2 weeks then 40 mg daily t for 2 weeks hen 60 mg daily.    He will follow-up again in January to reassess his blood pressure now that he is back on his medication, depression.    In regards to his wrists we will see how he is doing after he is back on meloxicam.  If his symptoms continue " may need to consider repeating EMG.  He is status post left carpal tunnel release but he has not had it done on his right.  He reports that his left wrist and hand symptoms seem to have returned similar to what he experienced previously.    William Szymanski MD  Family Medicine      This document was prepared using voice generated software.  While every attempt was made for accuracy, grammatical errors may exist.

## 2020-02-27 ENCOUNTER — TRANSFERRED RECORDS (OUTPATIENT)
Dept: HEALTH INFORMATION MANAGEMENT | Facility: OTHER | Age: 45
End: 2020-02-27

## 2020-02-27 LAB — RETINOPATHY: NEGATIVE

## 2020-08-06 ENCOUNTER — TELEPHONE (OUTPATIENT)
Dept: FAMILY MEDICINE | Facility: OTHER | Age: 45
End: 2020-08-06

## 2020-08-06 NOTE — TELEPHONE ENCOUNTER
Left message to call back....................  8/6/2020   8:35 AM  Miranda Suarez LPN on 8/6/2020 at 8:35 AM

## 2020-08-06 NOTE — TELEPHONE ENCOUNTER
Called Nimisha at [facility] and verified name and date of birth of patient. Patient was admitted to [facility] last night for depression. They are needing an updated medication list. I did an over the phone check with the RN there and she did not have a list of meds for him due to her being on call and not having them on hand. She reports he has been taking his medications infrequently and would like to set up a telephone or video visit for med management.  She is requesting a updated list be sent to . Will fax over a list.  Miranda Suarez LPN on 8/6/2020 at 9:35 AM

## 2020-08-07 ENCOUNTER — APPOINTMENT (OUTPATIENT)
Dept: CT IMAGING | Facility: OTHER | Age: 45
End: 2020-08-07
Attending: PHYSICIAN ASSISTANT
Payer: COMMERCIAL

## 2020-08-07 ENCOUNTER — HOSPITAL ENCOUNTER (EMERGENCY)
Facility: OTHER | Age: 45
Discharge: HOME | End: 2020-08-07
Attending: PHYSICIAN ASSISTANT | Admitting: PHYSICIAN ASSISTANT
Payer: COMMERCIAL

## 2020-08-07 VITALS
DIASTOLIC BLOOD PRESSURE: 66 MMHG | RESPIRATION RATE: 14 BRPM | WEIGHT: 240 LBS | SYSTOLIC BLOOD PRESSURE: 122 MMHG | HEIGHT: 71 IN | BODY MASS INDEX: 33.6 KG/M2 | OXYGEN SATURATION: 97 % | TEMPERATURE: 97.3 F | HEART RATE: 79 BPM

## 2020-08-07 DIAGNOSIS — R07.9 CHEST PAIN: ICD-10-CM

## 2020-08-07 DIAGNOSIS — F41.9 ANXIETY: ICD-10-CM

## 2020-08-07 LAB
ANION GAP SERPL CALCULATED.3IONS-SCNC: 8 MMOL/L (ref 3–14)
BASOPHILS # BLD AUTO: 0 10E9/L (ref 0–0.2)
BASOPHILS NFR BLD AUTO: 0.4 %
BUN SERPL-MCNC: 10 MG/DL (ref 7–25)
CALCIUM SERPL-MCNC: 8.7 MG/DL (ref 8.6–10.3)
CHLORIDE SERPL-SCNC: 102 MMOL/L (ref 98–107)
CO2 SERPL-SCNC: 27 MMOL/L (ref 21–31)
CREAT SERPL-MCNC: 1.1 MG/DL (ref 0.7–1.3)
DIFFERENTIAL METHOD BLD: NORMAL
EOSINOPHIL # BLD AUTO: 0 10E9/L (ref 0–0.7)
EOSINOPHIL NFR BLD AUTO: 0.6 %
ERYTHROCYTE [DISTWIDTH] IN BLOOD BY AUTOMATED COUNT: 11.8 % (ref 10–15)
GFR SERPL CREATININE-BSD FRML MDRD: 72 ML/MIN/{1.73_M2}
GLUCOSE SERPL-MCNC: 231 MG/DL (ref 70–105)
HCT VFR BLD AUTO: 44.3 % (ref 40–53)
HGB BLD-MCNC: 15.4 G/DL (ref 13.3–17.7)
IMM GRANULOCYTES # BLD: 0 10E9/L (ref 0–0.4)
IMM GRANULOCYTES NFR BLD: 0.2 %
LYMPHOCYTES # BLD AUTO: 0.8 10E9/L (ref 0.8–5.3)
LYMPHOCYTES NFR BLD AUTO: 15.2 %
MCH RBC QN AUTO: 29.5 PG (ref 26.5–33)
MCHC RBC AUTO-ENTMCNC: 34.8 G/DL (ref 31.5–36.5)
MCV RBC AUTO: 85 FL (ref 78–100)
MONOCYTES # BLD AUTO: 0.4 10E9/L (ref 0–1.3)
MONOCYTES NFR BLD AUTO: 7.5 %
NEUTROPHILS # BLD AUTO: 3.8 10E9/L (ref 1.6–8.3)
NEUTROPHILS NFR BLD AUTO: 76.1 %
PLATELET # BLD AUTO: 156 10E9/L (ref 150–450)
POTASSIUM SERPL-SCNC: 3.6 MMOL/L (ref 3.5–5.1)
RBC # BLD AUTO: 5.22 10E12/L (ref 4.4–5.9)
SODIUM SERPL-SCNC: 137 MMOL/L (ref 134–144)
TROPONIN I SERPL-MCNC: 2.9 PG/ML
TROPONIN I SERPL-MCNC: 3.3 PG/ML
WBC # BLD AUTO: 4.9 10E9/L (ref 4–11)

## 2020-08-07 PROCEDURE — 85025 COMPLETE CBC W/AUTO DIFF WBC: CPT | Performed by: EMERGENCY MEDICINE

## 2020-08-07 PROCEDURE — 25500064 ZZH RX 255 OP 636: Performed by: PHYSICIAN ASSISTANT

## 2020-08-07 PROCEDURE — 99285 EMERGENCY DEPT VISIT HI MDM: CPT | Mod: 25 | Performed by: PHYSICIAN ASSISTANT

## 2020-08-07 PROCEDURE — 80048 BASIC METABOLIC PNL TOTAL CA: CPT | Performed by: EMERGENCY MEDICINE

## 2020-08-07 PROCEDURE — 36415 COLL VENOUS BLD VENIPUNCTURE: CPT | Performed by: EMERGENCY MEDICINE

## 2020-08-07 PROCEDURE — 99284 EMERGENCY DEPT VISIT MOD MDM: CPT | Mod: Z6 | Performed by: PHYSICIAN ASSISTANT

## 2020-08-07 PROCEDURE — 93010 ELECTROCARDIOGRAM REPORT: CPT | Performed by: INTERNAL MEDICINE

## 2020-08-07 PROCEDURE — 71275 CT ANGIOGRAPHY CHEST: CPT

## 2020-08-07 PROCEDURE — 36415 COLL VENOUS BLD VENIPUNCTURE: CPT | Mod: XU | Performed by: PHYSICIAN ASSISTANT

## 2020-08-07 PROCEDURE — 93005 ELECTROCARDIOGRAM TRACING: CPT | Performed by: PHYSICIAN ASSISTANT

## 2020-08-07 PROCEDURE — 84484 ASSAY OF TROPONIN QUANT: CPT | Performed by: PHYSICIAN ASSISTANT

## 2020-08-07 PROCEDURE — 84484 ASSAY OF TROPONIN QUANT: CPT | Performed by: EMERGENCY MEDICINE

## 2020-08-07 RX ORDER — NITROGLYCERIN 0.4 MG/1
0.4 TABLET SUBLINGUAL
Status: DISCONTINUED | OUTPATIENT
Start: 2020-08-07 | End: 2020-08-07 | Stop reason: HOSPADM

## 2020-08-07 RX ADMIN — IOHEXOL 100 ML: 350 INJECTION, SOLUTION INTRAVENOUS at 11:34

## 2020-08-07 ASSESSMENT — ENCOUNTER SYMPTOMS
FEVER: 0
CHILLS: 0
CONFUSION: 0
ABDOMINAL PAIN: 0
BRUISES/BLEEDS EASILY: 0
NERVOUS/ANXIOUS: 1
CHEST TIGHTNESS: 0
SHORTNESS OF BREATH: 0
WOUND: 0
HEMATURIA: 0
BACK PAIN: 0
ADENOPATHY: 0

## 2020-08-07 ASSESSMENT — MIFFLIN-ST. JEOR: SCORE: 1990.76

## 2020-08-07 NOTE — ED PROVIDER NOTES
History     Chief Complaint   Patient presents with     Chest Pain     Anxiety     HPI  Charles Leopold Matter is a 44 year old male who presents to the ED coming from [facility].  He is reporting chest pain and anxiety.  He is currently at [facility] for depression, anxiety, and some suicidal thoughts.  Reports this morning he was sitting having his coughing when he began to have anterior chest pain that radiates around between his shoulder blades as well as down his left arm making it numb.  Originally his pain was 10 out of 10, currently he describes it as a dull ache.  He has had similar pain in the past but feels like it was probably due to anxiety.  He denies nausea or vomiting or diaphoresis.  He denies any unilateral leg swelling or recent surgeries or past history of blood clots or periods of stasis he is not on hormone therapy.  He does have significant family history of cardiac issues as well as he is diabetic with hypertension and high cholesterol.    Allergies:  No Known Allergies    Problem List:    Patient Active Problem List    Diagnosis Date Noted     Pain of both wrist joints 02/14/2019     Priority: Medium     Type 2 diabetes mellitus without complication, without long-term current use of insulin (H) 04/20/2018     Priority: Medium     Morbid obesity (H) 04/20/2018     Priority: Medium     Pain in thoracic spine 01/17/2018     Priority: Medium     Degeneration of thoracic or thoracolumbar intervertebral disc 01/17/2018     Priority: Medium     Overview:   Thoracic degenerative spine disease/juvenile disc disease-Dr. Samir Acosta       Gastritis 01/17/2018     Priority: Medium     Overview:   with dysphagia       Obesity 01/17/2018     Priority: Medium     Scoliosis (and kyphoscoliosis), idiopathic 01/17/2018     Priority: Medium     Overview:   Dr.Scott Acosta       SCOTT (obstructive sleep apnea) 10/15/2015     Priority: Medium     Overview:   Moderate, sleep study        Suicidal  ideation 2013     Priority: Medium     Hyperlipidemia, mild 2011     Priority: Medium     Depression, major 2010     Priority: Medium     Carpal tunnel syndrome, right 2010     Priority: Medium     Essential hypertension 2010     Priority: Medium     Gout 10/01/2005     Priority: Medium     Overview:   left great toe          Past Medical History:    Past Medical History:   Diagnosis Date     Encounter for screening for cardiovascular disorders      Essential (primary) hypertension      Gastro-esophageal reflux disease without esophagitis      Major depressive disorder, single episode      Mild intermittent asthma, uncomplicated      Nicotine dependence, uncomplicated      Suicidal ideations        Past Surgical History:    Past Surgical History:   Procedure Laterality Date     LAPAROSCOPIC CHOLECYSTECTOMY      ,Dr. Dunne     TYMPANOSTOMY, LOCAL/TOPICAL ANESTHESIA      No Comments Provided       Family History:    History reviewed. No pertinent family history.    Social History:  Marital Status:  Legally  [3]  Social History     Tobacco Use     Smoking status: Former Smoker     Packs/day: 0.00     Years: 20.00     Pack years: 0.00     Types: Cigarettes     Last attempt to quit: 2013     Years since quittin.0     Smokeless tobacco: Never Used   Substance Use Topics     Alcohol use: Yes     Comment: Alcoholic Drinks/day: occaisionally     Drug use: Never     Types: Other     Comment: Drug use: No        Medications:    acetaminophen (TYLENOL) 650 MG CR tablet  FLUoxetine (PROZAC) 20 MG capsule  FLUoxetine (PROZAC) 40 MG capsule  gabapentin (NEURONTIN) 300 MG capsule  glipiZIDE (GLIPIZIDE XL) 10 MG 24 hr tablet  lisinopril (PRINIVIL/ZESTRIL) 40 MG tablet  meloxicam (MOBIC) 15 MG tablet  metFORMIN (GLUCOPHAGE) 1000 MG tablet  omeprazole (PRILOSEC) 40 MG DR capsule  simvastatin (ZOCOR) 20 MG tablet          Review of Systems   Constitutional: Negative for chills and  "fever.   HENT: Negative for congestion.    Eyes: Negative for visual disturbance.   Respiratory: Negative for chest tightness and shortness of breath.    Cardiovascular: Positive for chest pain.   Gastrointestinal: Negative for abdominal pain.   Genitourinary: Negative for hematuria.   Musculoskeletal: Negative for back pain.   Skin: Negative for rash and wound.   Neurological: Negative for syncope.   Hematological: Negative for adenopathy. Does not bruise/bleed easily.   Psychiatric/Behavioral: Negative for confusion. The patient is nervous/anxious.        Physical Exam   BP: (!) 165/102  Pulse: 74  Heart Rate: 73  Temp: 97.3  F (36.3  C)  Resp: 18  Height: 180.3 cm (5' 11\")  Weight: 108.9 kg (240 lb)  SpO2: 99 %      Physical Exam  Constitutional:       General: He is not in acute distress.     Appearance: He is well-developed. He is not diaphoretic.   HENT:      Head: Normocephalic and atraumatic.   Eyes:      General: No scleral icterus.     Conjunctiva/sclera: Conjunctivae normal.   Neck:      Musculoskeletal: Neck supple.   Cardiovascular:      Rate and Rhythm: Normal rate and regular rhythm.   Pulmonary:      Effort: Pulmonary effort is normal.      Breath sounds: Normal breath sounds.   Abdominal:      Palpations: Abdomen is soft.      Tenderness: There is no abdominal tenderness.   Musculoskeletal:         General: No deformity.      Right lower leg: No edema.      Left lower leg: No edema.   Lymphadenopathy:      Cervical: No cervical adenopathy.   Skin:     General: Skin is warm and dry.      Findings: No rash.   Neurological:      Mental Status: He is alert and oriented to person, place, and time. Mental status is at baseline.   Psychiatric:         Mood and Affect: Mood is anxious.         Behavior: Behavior normal.         ED Course        Procedures          EKG read at 1036.  Heart rate 69, normal sinus rhythm, no ST changes.    Critical Care time:  none      Will need more         Results for orders " placed or performed during the hospital encounter of 08/07/20 (from the past 24 hour(s))   Troponin GH (now)   Result Value Ref Range    Troponin 3.3 <34.0 pg/mL   CTA Chest with Contrast    Narrative    PROCEDURE: CTA CHEST WITH CONTRAST 8/7/2020 12:23 PM    HISTORY: chest pain that radiates between shoulder blades. Aorta? PE?    COMPARISONS: None.    Meds/Dose Given:    TECHNIQUE: CT angiogram of the chest with sagittal and coronal MIP  reconstructions    FINDINGS: CT angiogram reveals no pulmonary emboli. The heart is  normal in size. The thoracic aorta appears normal.    The lungs are clear. There are no hilar or mediastinal masses or  lymphadenopathy. Axillary and supraclavicular lymph nodes appear  normal. Thyroid gland is normal. The upper portion of the liver spleen  and pancreas appear normal. The adrenal glands and upper portions of  the kidneys appear normal. There is vertebral body elongation and  degenerative osteophytes in the mid to lower thoracic vertebra  consistent with Scheuermann's disease.         Impression    IMPRESSION: No pulmonary emboli. Normal-appearing thoracic aorta.    SHIRA BANEGAS MD       Medications   iohexol (OMNIPAQUE) 350 mg/mL solution 100 mL (100 mLs Intravenous Given 8/7/20 1134)       Assessments & Plan (with Medical Decision Making)   Patient is nontoxic but slightly anxious appearing.  Heart, lung, bowel sounds are normal.  Abdomen appears soft nontender to palpation.  Vital signs are stable and he is afebrile.  He has good equal peripheral pulses in all extremities.  However, he is complaining about anterior chest pain that has radiated between his shoulder blades.  He has no unilateral leg swelling or bilateral leg swelling for that matter.    He has 2- troponins.  He has a normal white count and hemoglobin.  BMP is unremarkable other than a glucose of 231.  CTA of chest read as No pulmonary emboli. Normal-appearing thoracic aorta.    Heart score: 4    Upon  reassessment the patient does appear to be doing better.  It is unclear what is causing his symptoms today however he does not appear to currently be suffering from ACS, pulmonary embolism, aortic dissection, esophageal rupture, cardiac tamponade or pneumothorax.  He says his pain pretty much totally goes away until he starts feeling anxious again and then it returns.  I discussed with him his heart score and that he is at moderate risk for having a major adverse cardiac event in the near future.  We did talk about admission and possible transfer, he is not interested in doing that at this time.  However, I will place an outpatient stress echocardiogram for him as well as a referral to follow-up with PCP for reassessment.  He will return the ED if there are worsening or concerning symptoms.  He understands and agrees with plan patient is discharged.    Tom Erickson PA-C    I have reviewed the nursing notes.    I have reviewed the findings, diagnosis, plan and need for follow up with the patient.       HEART Score  Background  Calculates the overall risk of adverse event in patient's presenting with chest pain.  Based on 5 criteria (each assigned 0-2 points) including suspiciousness of history, EKG, age, risk factors and troponin.    Data  44 year old male  has Suicidal ideation; Pain in thoracic spine; Carpal tunnel syndrome, right; Degeneration of thoracic or thoracolumbar intervertebral disc; Depression, major; Gastritis; Gout; Hyperlipidemia, mild; Essential hypertension; Obesity; SCOTT (obstructive sleep apnea); Scoliosis (and kyphoscoliosis), idiopathic; Type 2 diabetes mellitus without complication, without long-term current use of insulin (H); Morbid obesity (H); and Pain of both wrist joints on their problem list.   reports that he quit smoking about 7 years ago. His smoking use included cigarettes. He smoked 0.00 packs per day for 20.00 years. He has never used smokeless tobacco.  family history is not  on file.  No results found for: TROPI  Criteria   0-2 points for each of 5 items (maximum of 10 points):  Score 1- History moderately suspicious for coronary syndrome  Score 0- EKG Normal  Score 1- Age 45 to 65 years old  Score 2- Three or more risk factors for or history of atherosclerotic disease  Score 0- Within normal limits for troponin levels  Interpretation  Risk of adverse outcome  Heart Score: 4  Total Score 4-6- Adverse Outcome Risk 20.3% - Supports admission with standard rule-out management -serial troponins and stress testing          Discharge Medication List as of 8/7/2020  1:43 PM          Final diagnoses:   Chest pain   Anxiety       8/7/2020   [facility]     Tom Erickson PA  08/08/20 1120

## 2020-08-07 NOTE — ED AVS SNAPSHOT
[facility]  [address]  [place] [address]  Phone:  [phone]  Fax:  [phone]                                    Charles Leopold Matter   MRN: [id number]    Department:  [facility]   Date of Visit:  8/7/2020           After Visit Summary Signature Page    I have received my discharge instructions, and my questions have been answered. I have discussed any challenges I see with this plan with the nurse or doctor.    ..........................................................................................................................................  Patient/Patient Representative Signature      ..........................................................................................................................................  Patient Representative Print Name and Relationship to Patient    ..................................................               ................................................  Date                                   Time    ..........................................................................................................................................  Reviewed by Signature/Title    ...................................................              ..............................................  Date                                               Time          22EPIC Rev 08/18

## 2020-08-07 NOTE — ED NOTES
Patient talking to provider, does have an increase in stressors with his family (received a call his brother may need surgery today for a heart attack and mom is also having some health issues also). States he was tested in [place] for Covid recently.

## 2020-08-07 NOTE — ED NOTES
Patient ambulated to the [facility] protective transport car independently. Patient transferring back to [facility].

## 2020-08-07 NOTE — ED TRIAGE NOTES
Patient comes via EMS from [facility] with complaints of chest pain and anxiety. He is being treated for depression, anxiety, and brief moment of suicidal thoughts. [facility]     Per EMS report: patient was sitting in a chair drinking coffee this am and started having upper back pain which then went to his left shoulder blade and around to the front in his chest. Initial pain was 10/10, in rig with EMS was 5/10, and decreasing as he becomes more calm. IV fluids and 324 mg of aspirin, 1 nitroglycerin, and patient was hypertensive, initially in the 170's systolically and then into the 160's after nitroglycerin. BG checked, in the 200's per EMS.

## 2020-08-09 LAB — INTERPRETATION ECG - MUSE: NORMAL

## 2020-08-11 ENCOUNTER — TELEPHONE (OUTPATIENT)
Dept: FAMILY MEDICINE | Facility: OTHER | Age: 45
End: 2020-08-11

## 2020-08-11 DIAGNOSIS — E11.9 TYPE 2 DIABETES MELLITUS WITHOUT COMPLICATION, WITHOUT LONG-TERM CURRENT USE OF INSULIN (H): Primary | ICD-10-CM

## 2020-08-11 RX ORDER — BLOOD SUGAR DIAGNOSTIC
STRIP MISCELLANEOUS
Qty: 50 EACH | OUTPATIENT
Start: 2020-08-11

## 2020-08-11 RX ORDER — BLOOD-GLUCOSE METER
EACH MISCELLANEOUS
Qty: 1 KIT | Refills: 1 | Status: SHIPPED | OUTPATIENT
Start: 2020-08-11

## 2020-08-11 RX ORDER — LANCETS
EACH MISCELLANEOUS
Qty: 102 EACH | OUTPATIENT
Start: 2020-08-11

## 2020-08-11 NOTE — TELEPHONE ENCOUNTER
Robina Ball sent Rx request for the following: blood glucose monitoring (ACCU-CHEK LILIANA PLUS) meter device kit  Sig NEEDS METER,STRIPS,LANCETS    Last Office Visit:              11/26/19   Future Office visit:           8/18/20    Routing refill request to provider for review/approval because:      Diabetic Supplies Protocol Failed  Medication is active on med list     Lupe Bennett RN on 8/11/2020 at 2:25 PM

## 2020-08-11 NOTE — TELEPHONE ENCOUNTER
Left message for patient to call back. How often is he testing his blood sugar.    Janeth Garcia LPN on 8/11/2020 at 4:34 PM

## 2020-08-11 NOTE — TELEPHONE ENCOUNTER
sent Rx request for the following:   Last Office Visit:              11/26/2019   Future Office visit:           8/18/2020    Noted kit was ordered today    Prescription refused.  This is a duplicate request.  Aurora Dickens RN.......8/11/2020 4:41 PM

## 2020-08-13 NOTE — TELEPHONE ENCOUNTER
Spoke with patient and he tests once a day. meds teed-up.  Norma J. Gosselin, LPN .......  8/13/2020  9:40 AM

## 2020-08-18 ENCOUNTER — OFFICE VISIT (OUTPATIENT)
Dept: FAMILY MEDICINE | Facility: OTHER | Age: 45
End: 2020-08-18
Attending: FAMILY MEDICINE
Payer: COMMERCIAL

## 2020-08-18 VITALS
TEMPERATURE: 98.2 F | DIASTOLIC BLOOD PRESSURE: 88 MMHG | RESPIRATION RATE: 16 BRPM | SYSTOLIC BLOOD PRESSURE: 136 MMHG | HEART RATE: 72 BPM | BODY MASS INDEX: 32.48 KG/M2 | HEIGHT: 71 IN | OXYGEN SATURATION: 98 % | WEIGHT: 232 LBS

## 2020-08-18 DIAGNOSIS — E11.9 TYPE 2 DIABETES MELLITUS WITHOUT COMPLICATION, WITHOUT LONG-TERM CURRENT USE OF INSULIN (H): Primary | ICD-10-CM

## 2020-08-18 DIAGNOSIS — F32.1 CURRENT MODERATE EPISODE OF MAJOR DEPRESSIVE DISORDER WITHOUT PRIOR EPISODE (H): ICD-10-CM

## 2020-08-18 DIAGNOSIS — L30.9 DERMATITIS: ICD-10-CM

## 2020-08-18 DIAGNOSIS — E78.5 HYPERLIPIDEMIA, MILD: ICD-10-CM

## 2020-08-18 DIAGNOSIS — R29.898 BILATERAL ARM WEAKNESS: ICD-10-CM

## 2020-08-18 DIAGNOSIS — F41.9 ANXIETY: ICD-10-CM

## 2020-08-18 DIAGNOSIS — R07.9 CHEST PAIN, UNSPECIFIED TYPE: ICD-10-CM

## 2020-08-18 DIAGNOSIS — G47.33 OSA (OBSTRUCTIVE SLEEP APNEA): ICD-10-CM

## 2020-08-18 LAB — HBA1C MFR BLD: 8.3 % (ref 4–6)

## 2020-08-18 PROCEDURE — G0463 HOSPITAL OUTPT CLINIC VISIT: HCPCS

## 2020-08-18 PROCEDURE — 99215 OFFICE O/P EST HI 40 MIN: CPT | Performed by: FAMILY MEDICINE

## 2020-08-18 PROCEDURE — 83036 HEMOGLOBIN GLYCOSYLATED A1C: CPT | Mod: ZL | Performed by: FAMILY MEDICINE

## 2020-08-18 PROCEDURE — 36415 COLL VENOUS BLD VENIPUNCTURE: CPT | Mod: ZL | Performed by: FAMILY MEDICINE

## 2020-08-18 RX ORDER — TRIAMCINOLONE ACETONIDE 5 MG/G
CREAM TOPICAL 2 TIMES DAILY
Qty: 30 G | Refills: 3 | Status: SHIPPED | OUTPATIENT
Start: 2020-08-18 | End: 2020-09-21

## 2020-08-18 RX ORDER — CITALOPRAM HYDROBROMIDE 10 MG/1
TABLET ORAL
Qty: 50 TABLET | Refills: 0 | Status: SHIPPED | OUTPATIENT
Start: 2020-08-18 | End: 2020-09-21

## 2020-08-18 ASSESSMENT — PAIN SCALES - GENERAL: PAINLEVEL: MODERATE PAIN (4)

## 2020-08-18 ASSESSMENT — PATIENT HEALTH QUESTIONNAIRE - PHQ9: SUM OF ALL RESPONSES TO PHQ QUESTIONS 1-9: 26

## 2020-08-18 ASSESSMENT — MIFFLIN-ST. JEOR: SCORE: 1954.48

## 2020-08-18 NOTE — NURSING NOTE
Patient presents today for ED follow up.    Previous A1C is at goal of <8  Lab Results   Component Value Date    A1C 11.4 11/27/2018    A1C 6.9 08/21/2018    A1C 9.5 04/20/2018     Urine microalbumin:creatine:    Foot exam due  Eye exam 02/27/20    Tobacco User no  Patient is not on a daily aspirin  Patient is on a Statin.  Blood pressure today of:     BP Readings from Last 1 Encounters:   08/07/20 122/66      is at the goal of <139/89 for diabetics.    Janeth Garcia LPN on 8/18/2020 at 3:37 PM      Medication Reconciliation Complete    Janeth Garcia LPN  8/18/2020 3:34 PM

## 2020-08-18 NOTE — PROGRESS NOTES
SUBJECTIVE:  Charles Leopold Matter is a 44 year old male here for ER follow-up.  He was seen emergency room twice recently.  On August 5 he was seen in [place] due to depression.  He initially reached out to [facility] who recommended he be seen in the emergency department.  He ultimately was placed on new leaf and restarted on Prozac.    He was then seen on August 7 after he was having chest pain and anxiety new leaf.  Work-up in the emergency department including labs, CT PE run and EKG were unremarkable.  He was scheduled for outpatient stress echo and this is scheduled for August 24.    He has longstanding history of type 2 diabetes, hypertension and depression which have all been unstable as he is routinely noncompliant with medications.  He was last seen November 2019, at that point Prozac was resumed as well as his chronic medications.  He was told to follow-up in January for which he no showed.    Since he is left new leaf couple of days ago he reports that he continues to have quite a bit of anxiety.  When his anxiety ramps up he tends to have chest and left arm pain.  He does not know of anything that happened in his life that is flared his anxiety at.  Reports that he continues to take his medications as ordered.  He is unable to tolerate CPAP.  He has been checking his sugars once to twice daily recently and has been in the low to mid 100s.  No hypoglycemia.      Patient Active Problem List    Diagnosis Date Noted     Pain of both wrist joints 02/14/2019     Priority: Medium     Type 2 diabetes mellitus without complication, without long-term current use of insulin (H) 04/20/2018     Priority: Medium     Morbid obesity (H) 04/20/2018     Priority: Medium     Pain in thoracic spine 01/17/2018     Priority: Medium     Degeneration of thoracic or thoracolumbar intervertebral disc 01/17/2018     Priority: Medium     Overview:   Thoracic degenerative spine disease/juvenile disc disease-Dr. Samir Acosta        Gastritis 01/17/2018     Priority: Medium     Overview:   with dysphagia       Obesity 01/17/2018     Priority: Medium     Scoliosis (and kyphoscoliosis), idiopathic 01/17/2018     Priority: Medium     Overview:   Dr.Scott Acosta       SCOTT (obstructive sleep apnea) 10/15/2015     Priority: Medium     Overview:   Moderate, sleep study        Suicidal ideation 07/30/2013     Priority: Medium     Hyperlipidemia, mild 02/17/2011     Priority: Medium     Depression, major 08/05/2010     Priority: Medium     Carpal tunnel syndrome, right 03/08/2010     Priority: Medium     Essential hypertension 01/27/2010     Priority: Medium     Gout 10/01/2005     Priority: Medium     Overview:   left great toe         Past Medical History:   Diagnosis Date     Encounter for screening for cardiovascular disorders     02-11, Negative stress test     Essential (primary) hypertension     No Comments Provided     Gastro-esophageal reflux disease without esophagitis     No Comments Provided     Major depressive disorder, single episode     After the death of his daughter in MVA     Mild intermittent asthma, uncomplicated     No Comments Provided     Nicotine dependence, uncomplicated     age [age], 1-2 packs per day, Quit Aug 2005     Suicidal ideations     2013,Hospitalized in [place]       Past Surgical History:   Procedure Laterality Date     LAPAROSCOPIC CHOLECYSTECTOMY      11-05,Dr. Dunne     TYMPANOSTOMY, LOCAL/TOPICAL ANESTHESIA      No Comments Provided       Current Outpatient Medications   Medication Sig Dispense Refill     blood glucose (NO BRAND SPECIFIED) test strip Use to test blood sugar 1 times daily. 100 each 11     blood glucose monitoring (ACCU-CHEK LILIANA PLUS) meter device kit NEEDS METER,STRIPS,LANCETS 1 kit 1     blood glucose monitoring (ACCU-CHEK MULTICLIX) lancets Use to test blood sugar 1 times daily. 102 each 11     citalopram (CELEXA) 10 MG tablet Take 1 tablet (10 mg) by mouth daily for 14 days, THEN 2  "tablets (20 mg) daily for 14 days. 50 tablet 0     gabapentin (NEURONTIN) 300 MG capsule TAKE TWO CAPSULES BY MOUTH THREE TIMES DAILY 180 capsule 11     glipiZIDE (GLIPIZIDE XL) 10 MG 24 hr tablet Take 1 tablet (10 mg) by mouth daily 90 tablet 3     lisinopril (PRINIVIL/ZESTRIL) 40 MG tablet Take 1 tablet (40 mg) by mouth daily 90 tablet 3     meloxicam (MOBIC) 15 MG tablet Take 1 tablet (15 mg) by mouth daily 90 tablet 3     metFORMIN (GLUCOPHAGE) 1000 MG tablet Take 1 tablet (1,000 mg) by mouth 2 times daily (with meals) 90 tablet 3     omeprazole (PRILOSEC) 40 MG DR capsule Take 1 capsule (40 mg) by mouth daily with food 90 capsule 3     simvastatin (ZOCOR) 20 MG tablet Take 1 tablet (20 mg) by mouth At Bedtime 90 tablet 3     triamcinolone (ARISTOCORT HP) 0.5 % external cream Apply topically 2 times daily 30 g 3       Allergies:  No Known Allergies    No family history on file.    Social History     Tobacco Use     Smoking status: Former Smoker     Packs/day: 0.00     Years: 20.00     Pack years: 0.00     Types: Cigarettes     Last attempt to quit: 2013     Years since quittin.0     Smokeless tobacco: Never Used   Substance Use Topics     Alcohol use: Yes     Comment: Alcoholic Drinks/day: occasionally     Drug use: Never     Types: Other     Comment: Drug use: No       ROS:    As above otherwise ROS is unremarkable.      OBJECTIVE:  /88   Pulse 72   Temp 98.2  F (36.8  C)   Resp 16   Ht 1.803 m (5' 11\")   Wt 105.2 kg (232 lb)   SpO2 98%   BMI 32.36 kg/m      EXAM:  General Appearance: Pleasant, alert, appropriate appearance for age. No acute distress  Head: Normal. Normocephalic, atraumatic.  Lungs: Normal chest wall and respirations. Clear to auscultation, no wheezes or crackles.  Cardiovascular: Regular rate and rhythm. S1, S2, no murmurs.  Musculoskeletal: No edema.  Skin: no concerning or new rashes.  Neurologic Exam: CN 2-12 grossly intact.  Normal gait.  Symmetric DTRs, No focal motor " or sensory deficits. No tremor.  Psychiatric Exam: Alert and oriented, appropriate affect.    ASSESSEMENT AND PLAN:    1. Type 2 diabetes mellitus without complication, without long-term current use of insulin (H)    2. Chest pain, unspecified type    3. Anxiety    4. SCOTT (obstructive sleep apnea)    5. Hyperlipidemia, mild    6. Current moderate episode of major depressive disorder without prior episode (H)    7. Bilateral arm weakness    8. Dermatitis      Discussed there are numerous items going on at this time so we will start to chip away at these individually.  At this point time he will follow-up for stress test as scheduled next week.      He will continue checking his blood sugars.  We will update hemoglobin A1c today.    In regards to his anxiety and depression clearly Prozac 60 mg daily is not helpful so we will titrate off over the next month.  We will start Celexa 10 mg daily for the next 2 weeks and then 20 mg daily.  Will reassess in 1 month when he follows up.    He continues to have bilateral upper extremity pain numbness and weakness.  This was present last November I saw him and he never did follow-up for his EMG.  A new referral for EMG of his bilateral upper extremities was placed.    Finally, he has some skin lesions that have been irritating him on his arms and legs.  They are quite itchy.  We will trial triamcinolone cream twice daily to see if they will be beneficial.    Depression Screening Follow Up    PHQ 8/18/2020   PHQ-9 Total Score 26   Q9: Thoughts of better off dead/self-harm past 2 weeks Nearly every day   F/U: Thoughts of suicide or self-harm -   F/U: Self harm-plan -   F/U: Self-harm action -   F/U: Safety concerns -     Last PHQ-9 8/18/2020   1.  Little interest or pleasure in doing things 3   2.  Feeling down, depressed, or hopeless 3   3.  Trouble falling or staying asleep, or sleeping too much 3   4.  Feeling tired or having little energy 3   5.  Poor appetite or overeating 3    6.  Feeling bad about yourself 2   7.  Trouble concentrating 3   8.  Moving slowly or restless 3   Q9: Thoughts of better off dead/self-harm past 2 weeks 3   PHQ-9 Total Score 26   Difficulty at work, home, or with people Very difficult   In the past two weeks have you had thoughts of suicide or self harm? -   Do you have concerns about your personal safety or the safety of others? -   In the past 2 weeks have you thought about a plan or had intention to harm yourself? -   In the past 2 weeks have you acted on these thoughts in any way? -         No flowsheet data found.      Follow Up    Follow Up Actions Taken  Crisis resource information provided in the After Visit Summary      Discussed the following ways the patient can remain in a safe environment:    A total of 55 minutes were spent with the patient, greater than 50% was in coordination of care, reviewing outside records and counseling the above problems.    William Szymanski MD  Family Medicine      This document was prepared using voice generated software.  While every attempt was made for accuracy, grammatical errors may exist.

## 2020-08-18 NOTE — PATIENT INSTRUCTIONS
Prozac   Take 40mg daily for 1 week   Starting August 25 Take 20mg daily for 1 week, then stop    Celexa   Take 10mg daily for 2 weeks   Starting September 1 take 20mg (2 tabs) daily for 2 weeks    Nerve test ordered

## 2020-08-18 NOTE — LETTER
August 19, 2020      Charles Leopold Matter  [address] [facility] [id number]        Dear ,    We are writing to inform you of your test results.    Your diabetes testing shows significant improvement from when we last checked it.  While you ar not back at goal (less then 8%), you should be proud of your improvement.  We will continue to work on this and I am confident you can get this back under good control.    Resulted Orders   Hemoglobin A1c   Result Value Ref Range    Hemoglobin A1C 8.3 (H) 4.0 - 6.0 %       If you have any questions or concerns, please call the clinic at the number listed above.       Sincerely,        Sg Szymanski MD

## 2020-08-21 ENCOUNTER — TELEPHONE (OUTPATIENT)
Dept: CARDIOLOGY | Facility: OTHER | Age: 45
End: 2020-08-21

## 2020-08-21 NOTE — TELEPHONE ENCOUNTER
Patient verified .  Reminder call for stress test with instructions given.  Patient states he will be able to bike to attain THR.   Patient verbalized understanding.

## 2020-08-24 ENCOUNTER — HOSPITAL ENCOUNTER (OUTPATIENT)
Dept: CARDIOLOGY | Facility: OTHER | Age: 45
Discharge: HOME | End: 2020-08-24
Attending: PHYSICIAN ASSISTANT | Admitting: PHYSICIAN ASSISTANT
Payer: COMMERCIAL

## 2020-08-24 VITALS — OXYGEN SATURATION: 97 % | TEMPERATURE: 96.9 F | WEIGHT: 240 LBS | BODY MASS INDEX: 33.6 KG/M2 | HEIGHT: 71 IN

## 2020-08-24 DIAGNOSIS — R94.39 ABNORMAL STRESS TEST: Primary | ICD-10-CM

## 2020-08-24 DIAGNOSIS — R07.9 CHEST PAIN: ICD-10-CM

## 2020-08-24 PROCEDURE — 25500064 ZZH RX 255 OP 636: Performed by: PHYSICIAN ASSISTANT

## 2020-08-24 PROCEDURE — 93321 DOPPLER ECHO F-UP/LMTD STD: CPT | Mod: 26 | Performed by: STUDENT IN AN ORGANIZED HEALTH CARE EDUCATION/TRAINING PROGRAM

## 2020-08-24 PROCEDURE — 93016 CV STRESS TEST SUPVJ ONLY: CPT | Performed by: INTERNAL MEDICINE

## 2020-08-24 PROCEDURE — 93321 DOPPLER ECHO F-UP/LMTD STD: CPT | Mod: TC

## 2020-08-24 PROCEDURE — 93325 DOPPLER ECHO COLOR FLOW MAPG: CPT | Mod: 26 | Performed by: STUDENT IN AN ORGANIZED HEALTH CARE EDUCATION/TRAINING PROGRAM

## 2020-08-24 PROCEDURE — 93350 STRESS TTE ONLY: CPT | Mod: 26 | Performed by: STUDENT IN AN ORGANIZED HEALTH CARE EDUCATION/TRAINING PROGRAM

## 2020-08-24 PROCEDURE — 93018 CV STRESS TEST I&R ONLY: CPT | Performed by: INTERNAL MEDICINE

## 2020-08-24 RX ADMIN — PERFLUTREN 4 ML: 6.52 INJECTION, SUSPENSION INTRAVENOUS at 10:45

## 2020-08-24 ASSESSMENT — MIFFLIN-ST. JEOR: SCORE: 1985.76

## 2020-08-24 NOTE — PROGRESS NOTES
0955The patient arrived for a stress echo.  The procedure, risks and benefits were discussed and the consent was signed.  The patient was prepped for the stress test, and an IV was placed per procedure.  The echo sonographer did the initial images with definity for image enhancement.   Cassandra Theodore NParrived, and the patient biked 9 minutes and 10 seconds  The patient developed chest pain at Stage 5  Rated pain 10/10 and subsided to chest aching rated 2/10 at recovery. Patient was pain free at discharge.  Stress images were completed and the IV was removed per procedure.  The patient was released in stable condition.  The patient was instructed that he should receive test results in 1-2 days.  Please see the chart for complete test results.  The patient has a follow-up appointment with Dr. Szymanski on 9/21/2020

## 2020-08-25 ENCOUNTER — TELEPHONE (OUTPATIENT)
Dept: FAMILY MEDICINE | Facility: OTHER | Age: 45
End: 2020-08-25

## 2020-08-25 NOTE — TELEPHONE ENCOUNTER
A cardiology referral was placed and patient was contacted for scheduling.  He has not received the call with his stress test results yet.  He is hoping for a call after 4pm today as that is when he will be off of work.  Thank you.  He was scheduled with Dr. Ortiz for 9.10.2020.  Kinsye Alexander on 8/25/2020 at 2:44 PM

## 2020-08-25 NOTE — TELEPHONE ENCOUNTER
Please see phone note from yesterday.  Recommend cardiology to review results of stress test with patient and further recommendations.

## 2020-08-27 ENCOUNTER — OFFICE VISIT (OUTPATIENT)
Dept: CARDIOLOGY | Facility: OTHER | Age: 45
End: 2020-08-27
Attending: FAMILY MEDICINE
Payer: COMMERCIAL

## 2020-08-27 VITALS
OXYGEN SATURATION: 98 % | DIASTOLIC BLOOD PRESSURE: 98 MMHG | HEART RATE: 74 BPM | RESPIRATION RATE: 18 BRPM | SYSTOLIC BLOOD PRESSURE: 196 MMHG | WEIGHT: 231 LBS | BODY MASS INDEX: 32.34 KG/M2 | TEMPERATURE: 96.4 F | HEIGHT: 71 IN

## 2020-08-27 DIAGNOSIS — K21.9 GASTROESOPHAGEAL REFLUX DISEASE WITHOUT ESOPHAGITIS: ICD-10-CM

## 2020-08-27 DIAGNOSIS — R94.39 ABNORMAL STRESS TEST: Primary | ICD-10-CM

## 2020-08-27 DIAGNOSIS — E11.9 TYPE 2 DIABETES MELLITUS WITHOUT COMPLICATION, WITHOUT LONG-TERM CURRENT USE OF INSULIN (H): ICD-10-CM

## 2020-08-27 DIAGNOSIS — Z87.891 HISTORY OF TOBACCO ABUSE: ICD-10-CM

## 2020-08-27 DIAGNOSIS — I20.89 STABLE ANGINA (H): ICD-10-CM

## 2020-08-27 DIAGNOSIS — I10 ESSENTIAL HYPERTENSION: ICD-10-CM

## 2020-08-27 DIAGNOSIS — J45.909 MILD ASTHMA WITHOUT COMPLICATION, UNSPECIFIED WHETHER PERSISTENT: ICD-10-CM

## 2020-08-27 DIAGNOSIS — I25.118 CORONARY ARTERY DISEASE OF NATIVE ARTERY OF NATIVE HEART WITH STABLE ANGINA PECTORIS (H): ICD-10-CM

## 2020-08-27 DIAGNOSIS — E78.2 MIXED HYPERLIPIDEMIA: ICD-10-CM

## 2020-08-27 DIAGNOSIS — E66.01 MORBID OBESITY (H): ICD-10-CM

## 2020-08-27 DIAGNOSIS — G47.33 OSA (OBSTRUCTIVE SLEEP APNEA): ICD-10-CM

## 2020-08-27 LAB — INTERPRETATION ECG - MUSE: NORMAL

## 2020-08-27 PROCEDURE — 93005 ELECTROCARDIOGRAM TRACING: CPT

## 2020-08-27 PROCEDURE — 93010 ELECTROCARDIOGRAM REPORT: CPT | Performed by: INTERNAL MEDICINE

## 2020-08-27 PROCEDURE — 99203 OFFICE O/P NEW LOW 30 MIN: CPT | Performed by: INTERNAL MEDICINE

## 2020-08-27 PROCEDURE — G0463 HOSPITAL OUTPT CLINIC VISIT: HCPCS

## 2020-08-27 PROCEDURE — G0463 HOSPITAL OUTPT CLINIC VISIT: HCPCS | Mod: 25

## 2020-08-27 RX ORDER — NITROGLYCERIN 0.4 MG/1
TABLET SUBLINGUAL
Qty: 25 TABLET | Refills: 3 | Status: SHIPPED | OUTPATIENT
Start: 2020-08-27 | End: 2021-09-21

## 2020-08-27 RX ORDER — ASPIRIN 81 MG/1
81 TABLET, CHEWABLE ORAL DAILY
Qty: 90 TABLET | Refills: 3 | Status: SHIPPED | OUTPATIENT
Start: 2020-08-27 | End: 2021-09-11

## 2020-08-27 RX ORDER — ROSUVASTATIN CALCIUM 20 MG/1
20 TABLET, COATED ORAL DAILY
Qty: 90 TABLET | Refills: 3 | Status: SHIPPED | OUTPATIENT
Start: 2020-08-27 | End: 2021-09-21

## 2020-08-27 RX ORDER — ASPIRIN 81 MG/1
81 TABLET, CHEWABLE ORAL ONCE
Status: DISCONTINUED | OUTPATIENT
Start: 2020-08-27 | End: 2020-08-27

## 2020-08-27 RX ORDER — HYDROCHLOROTHIAZIDE 25 MG/1
25 TABLET ORAL DAILY
Qty: 90 TABLET | Refills: 3 | Status: SHIPPED | OUTPATIENT
Start: 2020-08-27 | End: 2021-09-11

## 2020-08-27 RX ORDER — CLOPIDOGREL BISULFATE 75 MG/1
75 TABLET ORAL DAILY
Qty: 90 TABLET | Refills: 3 | Status: SHIPPED | OUTPATIENT
Start: 2020-08-27 | End: 2020-09-21

## 2020-08-27 ASSESSMENT — ANXIETY QUESTIONNAIRES
5. BEING SO RESTLESS THAT IT IS HARD TO SIT STILL: NOT AT ALL
6. BECOMING EASILY ANNOYED OR IRRITABLE: NEARLY EVERY DAY
1. FEELING NERVOUS, ANXIOUS, OR ON EDGE: NEARLY EVERY DAY
IF YOU CHECKED OFF ANY PROBLEMS ON THIS QUESTIONNAIRE, HOW DIFFICULT HAVE THESE PROBLEMS MADE IT FOR YOU TO DO YOUR WORK, TAKE CARE OF THINGS AT HOME, OR GET ALONG WITH OTHER PEOPLE: VERY DIFFICULT
7. FEELING AFRAID AS IF SOMETHING AWFUL MIGHT HAPPEN: NEARLY EVERY DAY
3. WORRYING TOO MUCH ABOUT DIFFERENT THINGS: NEARLY EVERY DAY
GAD7 TOTAL SCORE: 16
2. NOT BEING ABLE TO STOP OR CONTROL WORRYING: MORE THAN HALF THE DAYS

## 2020-08-27 ASSESSMENT — MIFFLIN-ST. JEOR: SCORE: 1942.81

## 2020-08-27 ASSESSMENT — PATIENT HEALTH QUESTIONNAIRE - PHQ9
SUM OF ALL RESPONSES TO PHQ QUESTIONS 1-9: 14
5. POOR APPETITE OR OVEREATING: MORE THAN HALF THE DAYS

## 2020-08-27 ASSESSMENT — PAIN SCALES - GENERAL: PAINLEVEL: SEVERE PAIN (6)

## 2020-08-27 NOTE — PROGRESS NOTES
[facility] HEART CARE   CARDIOLOGY CONSULT     Charles Leopold Matter   1975  [id number]    Sg Szymanski     Chief Complaint   Patient presents with     Consult For     abnormal stress test -chest pain          HPI:   Mr. Figueroa is a 45 gentleman who is being seen by cardiology for stable angina.  He had a abnormal stress echo suggesting LAD stenosis on 8/24/2020.  There is also history of morbid obesity with a weight of 231 today, hyperlipidemia-uncontrolled, hypertension-uncontrolled, [id number]-8-[id number], SCOTT, GERD, mild asthma, history of tobacco abuse quitting smoking in approximately 2013 had a half a pack a day.    He reports for the last 6 to 12 months, he has been having substernal chest pressure with radiation to his neck, left shoulder, and down his left arm.  Generally, this is brought on by emotional stress.  More recently, he has seen increased episodes with activity.  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  He works in a building of 7 people.  He states at times this can be labor-intensive.  With cutting this paper, he describes a tightness to his chest as outlined below.  Other times, his discomfort has a sharp component.  His chest discomfort is more often brought on by anxiety and stress.  He reports his daughter was killed in a motor vehicle accident at age 12, 18 years ago.  This resulted the divorce of his first wife 10 years ago.  He is remarried but has ongoing anxiety.  He states he misses his daughter every day.  His risk factors include history of tobacco abuse quitting approximate 7 years ago which would be around 2013-1/2 pack a day, uncontrolled diabetes mellitus with an A1c of 8.3% on 8/18/2020.  Cholesterol has been severely uncontrolled with a total cholesterol of 262, , and triglycerides of 193 on 11/14/2017.  He has a family history of heart  disease with an uncle having bypass today, 8/27/2020.  His mom has history of heart disease and his dad had heart disease with diabetes. With his chest discomfort, he admits to diaphoresis, nausea, shortness of breath, dizziness, but no vomiting.  His symptoms will last up to 5 minutes.  He has not been on aspirin or sublingual nitro.  His blood pressure today is severely uncontrolled at 196/98.     He went on to have a stress echo on 8/24/2020.  Test was felt to be high risk.  He had mid anterior, basal with mid anterior septal hypokinesis suggesting LAD territory.  Ejection fraction was 55 to 60% and went to 45 to 50% with mild dilatation of the LV cavity.    Based on his symptoms, history, and abnormal stress test, it was suggested he have a cardiac catheterization sooner than later.  [facility].  He will be scheduled for [facility] in a couple of weeks.    IMAGING RESULTS:   Stress echo on 8/24/2020:  Abnormal, high-risk exercise stress echocardiogram at sub-maximal exercise capacity.       The target heart rate was not achieved (80% of predicted). At peak exercise,  there is hypokinesis of the mid anterior and basal and mid anteroseptum,  segments. This is suggestive of LAD territory ischemia.   Normal LV size and function at baseline. The LVEF is 55-60% at rest and declined to 45-50% after exercise with mild dilation of the LV cavity.   Heart rate and blood pressure response to exercise were normal.   Normal functional capacity. The patient had chest pain during peak exercise.   The test was terminated due to fatigue.   No significant valvular or aortic abnormalities noted on screening tomograms.       CURRENT MEDICATIONS:   Prior to Admission medications    Medication Sig Start Date End Date Taking? Authorizing Provider   blood glucose (NO BRAND SPECIFIED) test strip Use to test blood sugar 1 times daily. 8/13/20   Sg Szymanski MD   blood glucose monitoring (ACCU-CHEK LILIANA PLUS)  meter device kit NEEDS METER,STRIPS,LANCETS 8/11/20   Sg Szymanski MD   blood glucose monitoring (ACCU-CHEK MULTICLIX) lancets Use to test blood sugar 1 times daily. 8/13/20   Sg Szymanski MD   citalopram (CELEXA) 10 MG tablet Take 1 tablet (10 mg) by mouth daily for 14 days, THEN 2 tablets (20 mg) daily for 14 days. 8/18/20 9/15/20  Sg Szymanski MD   gabapentin (NEURONTIN) 300 MG capsule TAKE TWO CAPSULES BY MOUTH THREE TIMES DAILY 11/26/19   Sg Szymanski MD   glipiZIDE (GLIPIZIDE XL) 10 MG 24 hr tablet Take 1 tablet (10 mg) by mouth daily 11/26/19   Sg Szymanski MD   lisinopril (PRINIVIL/ZESTRIL) 40 MG tablet Take 1 tablet (40 mg) by mouth daily 11/26/19   Sg Szymanski MD   meloxicam (MOBIC) 15 MG tablet Take 1 tablet (15 mg) by mouth daily 11/26/19   Sg Szymanski MD   metFORMIN (GLUCOPHAGE) 1000 MG tablet Take 1 tablet (1,000 mg) by mouth 2 times daily (with meals) 11/26/19   Sg Szymanski MD   omeprazole (PRILOSEC) 40 MG DR capsule Take 1 capsule (40 mg) by mouth daily with food 11/26/19   Sg Szymanski MD   simvastatin (ZOCOR) 20 MG tablet Take 1 tablet (20 mg) by mouth At Bedtime 11/26/19   Sg Szymanski MD   triamcinolone (ARISTOCORT HP) 0.5 % external cream Apply topically 2 times daily 8/18/20   Sg Szymanski MD       ALLERGIES:   No Known Allergies     PAST MEDICAL HISTORY:   Past Medical History:   Diagnosis Date     Encounter for screening for cardiovascular disorders     02-11, Negative stress test     Essential (primary) hypertension     No Comments Provided     Gastro-esophageal reflux disease without esophagitis     No Comments Provided     Major depressive disorder, single episode     After the death of his daughter in MVA     Mild intermittent asthma, uncomplicated     No Comments Provided     Nicotine dependence, uncomplicated     age [age], 1-2 packs per day, Quit Aug 2005     Suicidal ideations     2013,Hospitalized in [place]        PAST SURGICAL HISTORY:   Past  Surgical History:   Procedure Laterality Date     LAPAROSCOPIC CHOLECYSTECTOMY      ,Dr. Dunne     TYMPANOSTOMY, LOCAL/TOPICAL ANESTHESIA      No Comments Provided        FAMILY HISTORY:   History reviewed. No pertinent family history.     SOCIAL HISTORY:   Social History     Socioeconomic History     Marital status: Legally      Spouse name: Not on file     Number of children: Not on file     Years of education: Not on file     Highest education level: Not on file   Occupational History     Not on file   Social Needs     Financial resource strain: Not on file     Food insecurity     Worry: Not on file     Inability: Not on file     Transportation needs     Medical: Not on file     Non-medical: Not on file   Tobacco Use     Smoking status: Former Smoker     Packs/day: 0.00     Years: 20.00     Pack years: 0.00     Types: Cigarettes     Last attempt to quit: 2013     Years since quittin.0     Smokeless tobacco: Never Used   Substance and Sexual Activity     Alcohol use: Yes     Comment: Alcoholic Drinks/day: occasionally     Drug use: Never     Types: Other     Comment: Drug use: No     Sexual activity: Not Currently   Lifestyle     Physical activity     Days per week: Not on file     Minutes per session: Not on file     Stress: Not on file   Relationships     Social connections     Talks on phone: Not on file     Gets together: Not on file     Attends Alevism service: Not on file     Active member of club or organization: Not on file     Attends meetings of clubs or organizations: Not on file     Relationship status: Not on file     Intimate partner violence     Fear of current or ex partner: Not on file     Emotionally abused: Not on file     Physically abused: Not on file     Forced sexual activity: Not on file   Other Topics Concern     Parent/sibling w/ CABG, MI or angioplasty before 65F 55M? Not Asked   Social History Narrative    .  Two sons.      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      Works as a  at [employer].      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          ROS:   CONSTITUTIONAL: No weight loss, fever, chills, but admits to generalized weakness and fatigue.   HEENT: Eyes: No visual changes. Ears, Nose, Throat: No hearing loss, congestion or difficulty swallowing.   CARDIOVASCULAR: (+) chest pain, chest pressure and chest discomfort. No palpitations but with mild lower extremity edema.   RESPIRATORY: (+) shortness of breath, dyspnea upon exertion, cough or sputum production.   GASTROINTESTINAL: No abdominal pain. No anorexia, nausea, vomiting or diarrhea.   NEUROLOGICAL: No headache, lightheadedness, dizziness, syncope, ataxia or weakness.   HEMATOLOGIC: No anemia, bleeding or bruising.   PSYCHIATRIC: No history of depression or anxiety.   ENDOCRINOLOGIC: No reports of sweating, cold or heat intolerance. No polyuria or polydipsia.   SKIN: No abnormal rashes or itching.       PHYSICAL EXAM:   GENERAL: The patient is a well-developed, well-nourished, in no apparent distress. Alert and oriented x3.   HEENT: Head is normocephalic and atraumatic. Eyes are symmetrical with normal visual tracking.  HEART: Regular rate and rhythm, S1S2 present without murmur, rub or gallop.   LUNGS: Respirations regular and unlabored. Clear to auscultation.   GI: Abdomen is soft and nondistended.   EXTREMITIES: No peripheral edema present.   MUSCULOSKELETAL: No joint swelling.   NEUROLOGIC: Alert and oriented X3.    SKIN: No jaundice. No rashes or visible skin lesions present.        LAB RESULTS:   Office Visit on 08/18/2020   Component Date Value Ref Range Status     Hemoglobin A1C 08/18/2020 8.3* 4.0 - 6.0 % Final          ASSESSMENT:       ICD-10-CM    1. Abnormal stress test on 8/24/20  R94.39 CARDIOLOGY EVAL ADULT REFERRAL     EKG 12-lead, tracing only     nitroGLYcerin (NITROSTAT) 0.4 MG  sublingual tablet     clopidogrel (PLAVIX) 75 MG tablet     CARDIOLOGY EVAL ADULT REFERRAL     aspirin (ASA) 81 MG chewable tablet     DISCONTINUED: aspirin (ASA) chewable tablet 81 mg     CANCELED: Case Request Cath Lab: Coronary Angiogram   2. Coronary artery disease of native artery of native heart with stable angina pectoris (H)  I25.118 nitroGLYcerin (NITROSTAT) 0.4 MG sublingual tablet     clopidogrel (PLAVIX) 75 MG tablet     CARDIOLOGY EVAL ADULT REFERRAL     aspirin (ASA) 81 MG chewable tablet     DISCONTINUED: aspirin (ASA) chewable tablet 81 mg     CANCELED: Case Request Cath Lab: Coronary Angiogram   3. Stable angina (H)  I20.8 nitroGLYcerin (NITROSTAT) 0.4 MG sublingual tablet     clopidogrel (PLAVIX) 75 MG tablet     CARDIOLOGY EVAL ADULT REFERRAL     aspirin (ASA) 81 MG chewable tablet     DISCONTINUED: aspirin (ASA) chewable tablet 81 mg     CANCELED: Case Request Cath Lab: Coronary Angiogram   4. Morbid obesity (H)  E66.01 DISCONTINUED: aspirin (ASA) chewable tablet 81 mg   5. Mixed hyperlipidemia  E78.2 rosuvastatin (CRESTOR) 20 MG tablet   6. Essential hypertension  I10 hydrochlorothiazide (HYDRODIURIL) 25 MG tablet   7. Type 2 diabetes mellitus without complication, without long-term current use of insulin (H)  E11.9    8. SCOTT (obstructive sleep apnea)  G47.33    9. Gastroesophageal reflux disease without esophagitis  K21.9    10. Mild asthma   J45.909    11. History of tobacco abuse quitting in approximately 2013 at a half a pack a day  Z87.891          PLAN:   1.  Based on risk factors with uncontrolled diabetes, uncontrolled risk factors, and abnormal stress echo as outlined above.  It was suggested he have a cardiac catheterization sooner than later.  [facility] is available September 8.  [facility] is available September 21.  He feels it would be easier to get to [facility] anyways.  He prefer to go to [facility].  He will be scheduled for cardiac catheterization at [facility].  2.  Start on  aspirin 81 mg daily chewable.  3.  Stop Zocor 20 mg daily.  4.  Start Crestor 20 mg daily  5.  Start hydrochlorothiazide 25 mg daily for pressures  6.  He was given prescription for sublingual nitro as needed for chest pain.  7.  Start Plavix 75 mg daily.  8.  Follow-up after completion of cardiac catheterization at [facility] which will be in approximately 1 month.  Risk and benefits for cardiac catheterization were discussed this includes but not limited to bleeding, bruising, infection, perforation, transfusion, heart attack, stroke, and death.  Patient states he understands and is agreeable.    Thank you for allowing me to participate in the care of your patient. Please do not hesitate to contact me if you have any questions.     Sg Ortiz, DO

## 2020-08-27 NOTE — PATIENT INSTRUCTIONS
You were seen by  Sg Ortiz, DO       1. [facility] will call you to schedule Angiogram.     2. Stop Zocor.    3. Start rosuvastatin (CRESTOR) 20 MG tablet Take 1 tablet (20 mg) by mouth daily     4. Start clopidogrel (PLAVIX) 75 MG tablet Take 1 tablet (75 mg) by mouth daily    5. Start hydrochlorothiazide (HYDRODIURIL) 25 MG tablet Take 1 tablet (25 mg) by mouth daily    6. Start aspirin (ASA) 81 MG chewable tablet Take 1 tablet (81 mg) by mouth daily     7. nitroGLYcerin (NITROSTAT) 0.4 MG sublingual tablet For chest pain place 1 tablet under the tongue every 5 minutes for 3 doses. If symptoms persist 5 minutes after 1st dose call 911.     8. Medication sent to pharmacy.     You will follow up with [facility] after Angiogram, sooner if needed.       Please call the cardiology office with problems, questions, or concerns at [phone].    If you experience chest pain, chest pressure, chest tightness, shortness of breath, fainting, lightheadedness, nausea, vomiting, or other concerning symptoms, please report to the Emergency Department or call 911. These symptoms may be emergent, and best treated in the Emergency Department.     Cardiology Nurses  SAMINA Schmidt, SHABBIR COOMBS LPN  [facility] Cardiology ([address])  [phone]

## 2020-08-27 NOTE — NURSING NOTE
"Patient comes in for consult on abnormal stress test.  Judit Jane LPN ....................8/27/2020   12:46 PM  Chief Complaint   Patient presents with     Consult For     abnormal stress test -chest pain       Initial BP (!) 196/98 (BP Location: Right arm, Patient Position: Sitting, Cuff Size: Adult Large)   Pulse 74   Temp 96.4  F (35.8  C) (Tympanic)   Resp 18   Ht 1.8 m (5' 10.87\")   Wt 104.8 kg (231 lb)   SpO2 98%   BMI 32.34 kg/m   Estimated body mass index is 32.34 kg/m  as calculated from the following:    Height as of this encounter: 1.8 m (5' 10.87\").    Weight as of this encounter: 104.8 kg (231 lb).  Meds Reconciled: complete  Pt is not on Aspirin  Pt is on a Statin  PHQ and/or MARGY reviewed. Pt referred to PCP/MH Provider as appropriate.    Judit Jane LPN      "

## 2020-08-28 ASSESSMENT — ANXIETY QUESTIONNAIRES: GAD7 TOTAL SCORE: 16

## 2020-09-01 ENCOUNTER — TELEPHONE (OUTPATIENT)
Dept: CARDIOLOGY | Facility: OTHER | Age: 45
End: 2020-09-01

## 2020-09-01 DIAGNOSIS — Z01.812 PRE-PROCEDURE LAB EXAM: Primary | ICD-10-CM

## 2020-09-01 NOTE — TELEPHONE ENCOUNTER
Kenia from [facility] called letting us know that patient is scheduled for an angiogram on 9.8.2020.  He will need a CBC, BMP, and COVID test prior to that date.  Will Dr. Ortiz please place orders?  Thank you!  Kinsey Alexander on 9/1/2020 at 4:16 PM

## 2020-09-01 NOTE — TELEPHONE ENCOUNTER
Patient states still has not received a call from [facility] in regards to Angiogram.  Called and left a message for Kenia at [facility] to give patient a call with date and time.  Patient is aware that someone will give him a call within the next couple of day.  Althea Mensah RN on 9/1/2020 at 1:04 PM

## 2020-09-02 DIAGNOSIS — Z01.812 PRE-PROCEDURE LAB EXAM: ICD-10-CM

## 2020-09-02 LAB
ANION GAP SERPL CALCULATED.3IONS-SCNC: 9 MMOL/L (ref 3–14)
BUN SERPL-MCNC: 12 MG/DL (ref 7–25)
CALCIUM SERPL-MCNC: 9.7 MG/DL (ref 8.6–10.3)
CHLORIDE SERPL-SCNC: 99 MMOL/L (ref 98–107)
CO2 SERPL-SCNC: 33 MMOL/L (ref 21–31)
CREAT SERPL-MCNC: 1.04 MG/DL (ref 0.7–1.3)
ERYTHROCYTE [DISTWIDTH] IN BLOOD BY AUTOMATED COUNT: 11.9 % (ref 10–15)
GFR SERPL CREATININE-BSD FRML MDRD: 77 ML/MIN/{1.73_M2}
GLUCOSE SERPL-MCNC: 187 MG/DL (ref 70–105)
HCT VFR BLD AUTO: 46.4 % (ref 40–53)
HGB BLD-MCNC: 15.6 G/DL (ref 13.3–17.7)
MCH RBC QN AUTO: 28.8 PG (ref 26.5–33)
MCHC RBC AUTO-ENTMCNC: 33.6 G/DL (ref 31.5–36.5)
MCV RBC AUTO: 86 FL (ref 78–100)
PLATELET # BLD AUTO: 157 10E9/L (ref 150–450)
POTASSIUM SERPL-SCNC: 3.9 MMOL/L (ref 3.5–5.1)
RBC # BLD AUTO: 5.41 10E12/L (ref 4.4–5.9)
SODIUM SERPL-SCNC: 141 MMOL/L (ref 134–144)
WBC # BLD AUTO: 4.5 10E9/L (ref 4–11)

## 2020-09-02 PROCEDURE — 80048 BASIC METABOLIC PNL TOTAL CA: CPT | Mod: ZL | Performed by: INTERNAL MEDICINE

## 2020-09-02 PROCEDURE — 36415 COLL VENOUS BLD VENIPUNCTURE: CPT | Mod: ZL | Performed by: INTERNAL MEDICINE

## 2020-09-02 PROCEDURE — 85027 COMPLETE CBC AUTOMATED: CPT | Mod: ZL | Performed by: INTERNAL MEDICINE

## 2020-09-05 ENCOUNTER — ALLIED HEALTH/NURSE VISIT (OUTPATIENT)
Dept: FAMILY MEDICINE | Facility: OTHER | Age: 45
End: 2020-09-05
Attending: INTERNAL MEDICINE
Payer: COMMERCIAL

## 2020-09-05 DIAGNOSIS — Z01.812 PRE-PROCEDURE LAB EXAM: ICD-10-CM

## 2020-09-05 PROCEDURE — 99207 ZZC NO CHARGE NURSE ONLY: CPT

## 2020-09-05 PROCEDURE — U0003 INFECTIOUS AGENT DETECTION BY NUCLEIC ACID (DNA OR RNA); SEVERE ACUTE RESPIRATORY SYNDROME CORONAVIRUS 2 (SARS-COV-2) (CORONAVIRUS DISEASE [COVID-19]), AMPLIFIED PROBE TECHNIQUE, MAKING USE OF HIGH THROUGHPUT TECHNOLOGIES AS DESCRIBED BY CMS-2020-01-R: HCPCS | Mod: ZL | Performed by: INTERNAL MEDICINE

## 2020-09-05 PROCEDURE — C9803 HOPD COVID-19 SPEC COLLECT: HCPCS

## 2020-09-05 NOTE — NURSING NOTE
"Chief Complaint   Patient presents with     Covid 19 Testing     Patient swabbed for COVID-19 testing.  Hoda Meade LPN on 9/5/2020 at 12:23 PM      Initial There were no vitals taken for this visit. Estimated body mass index is 32.34 kg/m  as calculated from the following:    Height as of 8/27/20: 1.8 m (5' 10.87\").    Weight as of 8/27/20: 104.8 kg (231 lb).  Medication Reconciliation: complete    Hoda Meade LPN  "

## 2020-09-06 LAB
SARS-COV-2 RNA SPEC QL NAA+PROBE: NOT DETECTED
SPECIMEN SOURCE: NORMAL

## 2020-09-08 ENCOUNTER — TRANSFERRED RECORDS (OUTPATIENT)
Dept: HEALTH INFORMATION MANAGEMENT | Facility: OTHER | Age: 45
End: 2020-09-08

## 2020-09-09 ENCOUNTER — TRANSFERRED RECORDS (OUTPATIENT)
Dept: HEALTH INFORMATION MANAGEMENT | Facility: OTHER | Age: 45
End: 2020-09-09

## 2020-09-09 ENCOUNTER — MEDICAL CORRESPONDENCE (OUTPATIENT)
Dept: HEALTH INFORMATION MANAGEMENT | Facility: OTHER | Age: 45
End: 2020-09-09

## 2020-09-09 LAB
CREAT SERPL-MCNC: 0.9 MG/DL (ref 0.8–1.5)
GFR SERPL CREATININE-BSD FRML MDRD: >60 ML/MIN/1.73M2
GLUCOSE SERPL-MCNC: 151 MG/DL (ref 60–99)
POTASSIUM SERPL-SCNC: 3.7 MMOL/L (ref 3.5–5.1)

## 2020-09-11 ENCOUNTER — TELEPHONE (OUTPATIENT)
Dept: CARDIAC REHAB | Facility: OTHER | Age: 45
End: 2020-09-11

## 2020-09-14 ENCOUNTER — TELEPHONE (OUTPATIENT)
Dept: CARDIAC REHAB | Facility: OTHER | Age: 45
End: 2020-09-14

## 2020-09-14 DIAGNOSIS — Z95.5 S/P CORONARY ARTERY STENT PLACEMENT: Primary | ICD-10-CM

## 2020-09-14 NOTE — TELEPHONE ENCOUNTER
Returned call to patient, after trying to contact him to set up intake for cardiac rehab. Verified . Scheduled 20 at 08:00. Instructed in check in at unit 2.

## 2020-09-18 ENCOUNTER — HOSPITAL ENCOUNTER (OUTPATIENT)
Dept: CARDIAC REHAB | Facility: OTHER | Age: 45
End: 2020-09-18
Attending: INTERNAL MEDICINE
Payer: COMMERCIAL

## 2020-09-18 ENCOUNTER — TELEPHONE (OUTPATIENT)
Dept: FAMILY MEDICINE | Facility: OTHER | Age: 45
End: 2020-09-18

## 2020-09-18 DIAGNOSIS — Z95.5 S/P CORONARY ARTERY STENT PLACEMENT: ICD-10-CM

## 2020-09-18 PROCEDURE — 40000116 ZZH STATISTIC OP CR VISIT

## 2020-09-18 PROCEDURE — 93798 PHYS/QHP OP CAR RHAB W/ECG: CPT

## 2020-09-18 ASSESSMENT — PATIENT HEALTH QUESTIONNAIRE - PHQ9: SUM OF ALL RESPONSES TO PHQ QUESTIONS 1-9: 14

## 2020-09-21 ENCOUNTER — OFFICE VISIT (OUTPATIENT)
Dept: FAMILY MEDICINE | Facility: OTHER | Age: 45
End: 2020-09-21
Attending: FAMILY MEDICINE
Payer: COMMERCIAL

## 2020-09-21 VITALS
RESPIRATION RATE: 16 BRPM | DIASTOLIC BLOOD PRESSURE: 86 MMHG | SYSTOLIC BLOOD PRESSURE: 124 MMHG | HEIGHT: 71 IN | HEART RATE: 72 BPM | OXYGEN SATURATION: 96 % | TEMPERATURE: 97.1 F | WEIGHT: 233.8 LBS | BODY MASS INDEX: 32.73 KG/M2

## 2020-09-21 DIAGNOSIS — F41.9 ANXIETY: ICD-10-CM

## 2020-09-21 DIAGNOSIS — I25.118 CORONARY ARTERY DISEASE OF NATIVE ARTERY OF NATIVE HEART WITH STABLE ANGINA PECTORIS (H): ICD-10-CM

## 2020-09-21 DIAGNOSIS — I20.89 STABLE ANGINA (H): ICD-10-CM

## 2020-09-21 DIAGNOSIS — I10 ESSENTIAL HYPERTENSION: ICD-10-CM

## 2020-09-21 DIAGNOSIS — G47.33 OSA (OBSTRUCTIVE SLEEP APNEA): ICD-10-CM

## 2020-09-21 DIAGNOSIS — E11.9 TYPE 2 DIABETES MELLITUS WITHOUT COMPLICATION, WITHOUT LONG-TERM CURRENT USE OF INSULIN (H): ICD-10-CM

## 2020-09-21 DIAGNOSIS — Z95.5 S/P CORONARY ARTERY STENT PLACEMENT: Primary | ICD-10-CM

## 2020-09-21 DIAGNOSIS — R94.39 ABNORMAL STRESS TEST: ICD-10-CM

## 2020-09-21 PROCEDURE — 99215 OFFICE O/P EST HI 40 MIN: CPT | Performed by: FAMILY MEDICINE

## 2020-09-21 PROCEDURE — G0463 HOSPITAL OUTPT CLINIC VISIT: HCPCS

## 2020-09-21 RX ORDER — PRASUGREL 10 MG/1
10 TABLET, FILM COATED ORAL DAILY
COMMUNITY
Start: 2020-09-21 | End: 2020-09-24 | Stop reason: ALTCHOICE

## 2020-09-21 RX ORDER — CARVEDILOL 6.25 MG/1
6.25 TABLET ORAL 2 TIMES DAILY WITH MEALS
COMMUNITY
Start: 2020-09-21 | End: 2021-08-12

## 2020-09-21 RX ORDER — CITALOPRAM HYDROBROMIDE 20 MG/1
20 TABLET ORAL DAILY
Qty: 30 TABLET | Refills: 3 | Status: SHIPPED | OUTPATIENT
Start: 2020-09-21 | End: 2020-10-23

## 2020-09-21 ASSESSMENT — ANXIETY QUESTIONNAIRES
6. BECOMING EASILY ANNOYED OR IRRITABLE: NEARLY EVERY DAY
3. WORRYING TOO MUCH ABOUT DIFFERENT THINGS: SEVERAL DAYS
IF YOU CHECKED OFF ANY PROBLEMS ON THIS QUESTIONNAIRE, HOW DIFFICULT HAVE THESE PROBLEMS MADE IT FOR YOU TO DO YOUR WORK, TAKE CARE OF THINGS AT HOME, OR GET ALONG WITH OTHER PEOPLE: SOMEWHAT DIFFICULT
GAD7 TOTAL SCORE: 16
1. FEELING NERVOUS, ANXIOUS, OR ON EDGE: NEARLY EVERY DAY
2. NOT BEING ABLE TO STOP OR CONTROL WORRYING: SEVERAL DAYS
7. FEELING AFRAID AS IF SOMETHING AWFUL MIGHT HAPPEN: MORE THAN HALF THE DAYS
5. BEING SO RESTLESS THAT IT IS HARD TO SIT STILL: NEARLY EVERY DAY

## 2020-09-21 ASSESSMENT — PAIN SCALES - GENERAL: PAINLEVEL: MODERATE PAIN (5)

## 2020-09-21 ASSESSMENT — PATIENT HEALTH QUESTIONNAIRE - PHQ9
5. POOR APPETITE OR OVEREATING: NEARLY EVERY DAY
SUM OF ALL RESPONSES TO PHQ QUESTIONS 1-9: 14

## 2020-09-21 ASSESSMENT — MIFFLIN-ST. JEOR: SCORE: 1955.57

## 2020-09-21 NOTE — NURSING NOTE
Patient presents today for follow up on the Celexa.   Medication Reconciliation Complete    Janeth Garcia LPN  9/21/2020 12:41 PM

## 2020-09-21 NOTE — PROGRESS NOTES
SUBJECTIVE:  Charles Leopold Matter is a 45 year old male here for hospital follow-up.  He has a history of abnormal CT angiogram of his chest and ultimately abnormal stress test.  He was seen at [facility] for angiography noted 70% blockage of his LAD.  He had 2 stents placed at the time of his angiography.  He was also started on parsugrel for 1 year and carvedilol twice daily.    His cardiac risk factors include type 2 diabetes, hyperlipidemia and hypertension.  His most recent hemoglobin A1c in August was 8.3%.  He continues on metformin and glipizide as well as Crestor.    He has several skin lesions on his neck and back that continue to bother him.  He has been trying triamcinolone cream twice daily without any success.  They continue to be quite itchy.      Patient Active Problem List    Diagnosis Date Noted     Abnormal stress test on 8/24/20 08/27/2020     Priority: Medium     Coronary artery disease of native artery of native heart with stable angina pectoris (H) 08/27/2020     Priority: Medium     Mixed hyperlipidemia 08/27/2020     Priority: Medium     Gastroesophageal reflux disease without esophagitis 08/27/2020     Priority: Medium     Mild asthma  08/27/2020     Priority: Medium     History of tobacco abuse quitting in approximately 2013 at a half a pack a day 08/27/2020     Priority: Medium     Pain of both wrist joints 02/14/2019     Priority: Medium     Type 2 diabetes mellitus without complication, without long-term current use of insulin (H) 04/20/2018     Priority: Medium     Morbid obesity (H) 04/20/2018     Priority: Medium     Pain in thoracic spine 01/17/2018     Priority: Medium     Degeneration of thoracic or thoracolumbar intervertebral disc 01/17/2018     Priority: Medium     Overview:   Thoracic degenerative spine disease/juvenile disc disease-Dr. Samir Acosta       Gastritis 01/17/2018     Priority: Medium     Overview:   with dysphagia       Scoliosis (and kyphoscoliosis),  idiopathic 01/17/2018     Priority: Medium     Overview:   Dr.Scott Acosta       SCOTT (obstructive sleep apnea) 10/15/2015     Priority: Medium     Overview:   Moderate, sleep study        Suicidal ideation 07/30/2013     Priority: Medium     Depression, major 08/05/2010     Priority: Medium     Carpal tunnel syndrome, right 03/08/2010     Priority: Medium     Essential hypertension 01/27/2010     Priority: Medium     Gout 10/01/2005     Priority: Medium     Overview:   left great toe         Past Medical History:   Diagnosis Date     Encounter for screening for cardiovascular disorders     02-11, Negative stress test     Essential (primary) hypertension     No Comments Provided     Gastro-esophageal reflux disease without esophagitis     No Comments Provided     Major depressive disorder, single episode     After the death of his daughter in MVA     Mild intermittent asthma, uncomplicated     No Comments Provided     Nicotine dependence, uncomplicated     age [age], 1-2 packs per day, Quit Aug 2005     Suicidal ideations     2013,Hospitalized in [place]       Past Surgical History:   Procedure Laterality Date     LAPAROSCOPIC CHOLECYSTECTOMY      11-05,Dr. Dunne     TYMPANOSTOMY, LOCAL/TOPICAL ANESTHESIA      No Comments Provided       Current Outpatient Medications   Medication Sig Dispense Refill     aspirin (ASA) 81 MG chewable tablet Take 1 tablet (81 mg) by mouth daily 90 tablet 3     blood glucose (NO BRAND SPECIFIED) test strip Use to test blood sugar 1 times daily. 100 each 11     blood glucose monitoring (ACCU-CHEK LILIANA PLUS) meter device kit NEEDS METER,STRIPS,LANCETS 1 kit 1     blood glucose monitoring (ACCU-CHEK MULTICLIX) lancets Use to test blood sugar 1 times daily. 102 each 11     citalopram (CELEXA) 10 MG tablet Take 1 tablet (10 mg) by mouth daily for 14 days, THEN 2 tablets (20 mg) daily for 14 days. 50 tablet 0     clopidogrel (PLAVIX) 75 MG tablet Take 1 tablet (75 mg) by mouth daily  90 tablet 3     gabapentin (NEURONTIN) 300 MG capsule TAKE TWO CAPSULES BY MOUTH THREE TIMES DAILY 180 capsule 11     glipiZIDE (GLIPIZIDE XL) 10 MG 24 hr tablet Take 1 tablet (10 mg) by mouth daily 90 tablet 3     hydrochlorothiazide (HYDRODIURIL) 25 MG tablet Take 1 tablet (25 mg) by mouth daily 90 tablet 3     lisinopril (PRINIVIL/ZESTRIL) 40 MG tablet Take 1 tablet (40 mg) by mouth daily 90 tablet 3     meloxicam (MOBIC) 15 MG tablet Take 1 tablet (15 mg) by mouth daily 90 tablet 3     metFORMIN (GLUCOPHAGE) 1000 MG tablet Take 1 tablet (1,000 mg) by mouth 2 times daily (with meals) 90 tablet 3     nitroGLYcerin (NITROSTAT) 0.4 MG sublingual tablet For chest pain place 1 tablet under the tongue every 5 minutes for 3 doses. If symptoms persist 5 minutes after 1st dose call 911. 25 tablet 3     omeprazole (PRILOSEC) 40 MG DR capsule Take 1 capsule (40 mg) by mouth daily with food 90 capsule 3     rosuvastatin (CRESTOR) 20 MG tablet Take 1 tablet (20 mg) by mouth daily 90 tablet 3     triamcinolone (ARISTOCORT HP) 0.5 % external cream Apply topically 2 times daily 30 g 3       Allergies:  No Known Allergies    No family history on file.    Social History     Tobacco Use     Smoking status: Former Smoker     Packs/day: 0.00     Years: 20.00     Pack years: 0.00     Types: Cigarettes     Last attempt to quit: 2013     Years since quittin.1     Smokeless tobacco: Never Used   Substance Use Topics     Alcohol use: Yes     Comment: Alcoholic Drinks/day: occasionally-rare 4 tomes a year     Drug use: Never     Types: Other     Comment: Drug use: No       ROS:    As above otherwise ROS is unremarkable.      OBJECTIVE:  There were no vitals taken for this visit.    EXAM:  General Appearance: Pleasant, alert, appropriate appearance for age. No acute distress  Lungs: Normal chest wall and respirations. Clear to auscultation, no wheezes or crackles.  Cardiovascular: Regular rate and rhythm. S1, S2, no  murmurs.  Musculoskeletal: No edema.  Skin: 2 skin lesions consistent with seborrheic keratosis are seen on his neck and upper back.  Incision site from angiogram in his right wrist he is healing well.  Neurologic Exam: CN 2-12 grossly intact.  Normal gait.    Psychiatric Exam: Alert and oriented, appropriate affect.    ASSESSEMENT AND PLAN:    1. S/P coronary artery stent placement    2. SCOTT (obstructive sleep apnea)    3. Coronary artery disease of native artery of native heart with stable angina pectoris (H)    4. Essential hypertension    5. Type 2 diabetes mellitus without complication, without long-term current use of insulin (H)      Reviewed his most recent records from [facility] which are reflected above.  He is unclear if he has picked up Effient yet.  Discussed that if he is unable to pick it up due to insurance or availability he should continue Plavix until he is able to pick it up.  He reports that he has picked up and started taking carvedilol twice daily and is tolerating it well.  Follow-up with cardiology as scheduled in cardiac rehab.    Regards to his diabetes he reports that his blood sugars have been in the low 100s, this morning was 87.  He is had no hypoglycemia.    Blood pressure is controlled.    He has 2 skin lesions on his back were treated with liquid nitrogen for 3 freeze thaw cycles lasting to 3 seconds each at his request.  If this is not helpful we could consider a stronger steroid cream next month.    His initial follow-up was scheduled for depression follow-up.  He reports that he is having no significant provement with citalopram 20 mg daily however he is only been on this higher dose for the last week.  He has had no side effects.  We will plan on having him continue his current dosing and follow-up in 1 month for reassessment.    He declined a flu shot today.    A total of 50 minutes were spent with patient, greater free percent was in reviewing outside records, counseling of  his hospitalization and medication changes.    William Szymanski MD  Family Medicine      This document was prepared using voice generated software.  While every attempt was made for accuracy, grammatical errors may exist.

## 2020-09-21 NOTE — TELEPHONE ENCOUNTER
Left message for [facility] to call back from [facility].    Janeth Garcia LPN on 9/21/2020 at 1:24 PM

## 2020-09-22 ASSESSMENT — ANXIETY QUESTIONNAIRES: GAD7 TOTAL SCORE: 16

## 2020-09-23 ENCOUNTER — HOSPITAL ENCOUNTER (OUTPATIENT)
Dept: CARDIAC REHAB | Facility: OTHER | Age: 45
End: 2020-09-23
Attending: INTERNAL MEDICINE
Payer: COMMERCIAL

## 2020-09-23 PROCEDURE — 93798 PHYS/QHP OP CAR RHAB W/ECG: CPT

## 2020-09-23 PROCEDURE — 40000116 ZZH STATISTIC OP CR VISIT

## 2020-09-24 ENCOUNTER — OFFICE VISIT (OUTPATIENT)
Dept: CARDIOLOGY | Facility: OTHER | Age: 45
End: 2020-09-24
Attending: INTERNAL MEDICINE
Payer: COMMERCIAL

## 2020-09-24 VITALS
OXYGEN SATURATION: 98 % | TEMPERATURE: 96 F | WEIGHT: 235 LBS | BODY MASS INDEX: 32.9 KG/M2 | DIASTOLIC BLOOD PRESSURE: 84 MMHG | SYSTOLIC BLOOD PRESSURE: 126 MMHG | RESPIRATION RATE: 18 BRPM | HEART RATE: 65 BPM

## 2020-09-24 DIAGNOSIS — R07.89 OTHER CHEST PAIN: ICD-10-CM

## 2020-09-24 DIAGNOSIS — E11.9 TYPE 2 DIABETES MELLITUS WITHOUT COMPLICATION, WITHOUT LONG-TERM CURRENT USE OF INSULIN (H): ICD-10-CM

## 2020-09-24 DIAGNOSIS — R94.39 ABNORMAL STRESS TEST: ICD-10-CM

## 2020-09-24 DIAGNOSIS — Z98.890 S/P CARDIAC CATH: Primary | ICD-10-CM

## 2020-09-24 DIAGNOSIS — E66.01 MORBID OBESITY (H): ICD-10-CM

## 2020-09-24 DIAGNOSIS — I10 ESSENTIAL HYPERTENSION: ICD-10-CM

## 2020-09-24 DIAGNOSIS — Z95.5 HISTORY OF CORONARY ARTERY STENT PLACEMENT: ICD-10-CM

## 2020-09-24 DIAGNOSIS — G47.33 OSA (OBSTRUCTIVE SLEEP APNEA): ICD-10-CM

## 2020-09-24 DIAGNOSIS — Z87.891 HISTORY OF TOBACCO ABUSE: ICD-10-CM

## 2020-09-24 DIAGNOSIS — R19.5 DARK STOOLS: ICD-10-CM

## 2020-09-24 DIAGNOSIS — E78.2 MIXED HYPERLIPIDEMIA: ICD-10-CM

## 2020-09-24 DIAGNOSIS — I25.118 CORONARY ARTERY DISEASE OF NATIVE ARTERY OF NATIVE HEART WITH STABLE ANGINA PECTORIS (H): ICD-10-CM

## 2020-09-24 LAB
ANION GAP SERPL CALCULATED.3IONS-SCNC: 9 MMOL/L (ref 3–14)
BUN SERPL-MCNC: 12 MG/DL (ref 7–25)
CALCIUM SERPL-MCNC: 8.9 MG/DL (ref 8.6–10.3)
CHLORIDE SERPL-SCNC: 101 MMOL/L (ref 98–107)
CO2 SERPL-SCNC: 28 MMOL/L (ref 21–31)
CREAT SERPL-MCNC: 0.92 MG/DL (ref 0.7–1.3)
ERYTHROCYTE [DISTWIDTH] IN BLOOD BY AUTOMATED COUNT: 11.9 % (ref 10–15)
GFR SERPL CREATININE-BSD FRML MDRD: 88 ML/MIN/{1.73_M2}
GLUCOSE SERPL-MCNC: 207 MG/DL (ref 70–105)
HCT VFR BLD AUTO: 40.3 % (ref 40–53)
HGB BLD-MCNC: 13.7 G/DL (ref 13.3–17.7)
MCH RBC QN AUTO: 28.7 PG (ref 26.5–33)
MCHC RBC AUTO-ENTMCNC: 34 G/DL (ref 31.5–36.5)
MCV RBC AUTO: 85 FL (ref 78–100)
PLATELET # BLD AUTO: 166 10E9/L (ref 150–450)
POTASSIUM SERPL-SCNC: 3.7 MMOL/L (ref 3.5–5.1)
RBC # BLD AUTO: 4.77 10E12/L (ref 4.4–5.9)
SODIUM SERPL-SCNC: 138 MMOL/L (ref 134–144)
WBC # BLD AUTO: 5.3 10E9/L (ref 4–11)

## 2020-09-24 PROCEDURE — 85027 COMPLETE CBC AUTOMATED: CPT | Mod: ZL | Performed by: INTERNAL MEDICINE

## 2020-09-24 PROCEDURE — 99214 OFFICE O/P EST MOD 30 MIN: CPT | Performed by: INTERNAL MEDICINE

## 2020-09-24 PROCEDURE — 36415 COLL VENOUS BLD VENIPUNCTURE: CPT | Mod: ZL | Performed by: INTERNAL MEDICINE

## 2020-09-24 PROCEDURE — G0463 HOSPITAL OUTPT CLINIC VISIT: HCPCS

## 2020-09-24 PROCEDURE — 80048 BASIC METABOLIC PNL TOTAL CA: CPT | Mod: ZL | Performed by: INTERNAL MEDICINE

## 2020-09-24 RX ORDER — CLOPIDOGREL BISULFATE 75 MG/1
75 TABLET ORAL DAILY
Qty: 90 TABLET | Refills: 3
Start: 2020-09-24 | End: 2021-09-11

## 2020-09-24 ASSESSMENT — PAIN SCALES - GENERAL: PAINLEVEL: NO PAIN (0)

## 2020-09-24 NOTE — PROGRESS NOTES
[facility] HEART [facility]   CARDIOLOGY PROGRESS NOTE     Chief Complaint   Patient presents with     Follow Up          Diagnosis:  1.  S/P cardiac cath at [facility] on 9/8/2020.  He had x2 stents placed to ostial LAD.  2.  Abnormal stress test on 8/24/2020.  3.  CAD  4.  Morbid obesity  5.  Hyperlipidemia.  6.  Hypertension  7.  DM-2.  8.  SCOTT.  9.  GERD  10.  Mild asthma  11.  History of tobacco abuse quitting in 2013-1/2 pack a day.    Assessment/Plan:    1.  Continue Plavix until 9/9/2021.  They were not able to get Prasugrel.  2.  CBC and BMP today with concerns for dark loose stools, potentially related to GI bleed.  3.  Recommended Hemoccult but declined.  4.  Reassured about chest pain.  Chest pain is musculoskeletal as reproducible with palpation and deep breathing.  He states his job is labor intensive and has not been doing it for a while.  His muscles are sore, as a result.    5.  Follow-up 4 to 6 weeks to see if he has had improvement to his muscle/chest pain.        Interval history:  Herb was referred to [facility] secondary to a grossly abnormal stress test on 8/24/2020.  He had wall motion abnormalities consistent with LAD territory ischemia.  EF went from 50-55% at rest to 45-50% with activity with dilation of the LV cavity.  This resulted in x2 stents to his LAD secondary to a 70% lesion.  The rest of the vessels were found to have mild disease.  He continues to have chest pain today.  Chest pain is entirely reproduced with palpation.  Deep breathing makes his pain worse.  His pain is also exacerbated by stress.  He reports dark stools today which he attributes to medications and diet changes. Certainly, there is concern for acid reflux, ulcer, and GI bleeding. I suggested he have a CBC as well as Hemoccult.  He declined Hemoccult.  He was told of symptoms and of anemia and was let us know if he would experience these.  They have not been able get Prasugrel.  He has been on Plavix.      HPI:      Matter is being seen by cardiology for stable angina.  He had a abnormal stress echo suggesting LAD stenosis on 8/24/2020.  There is also history of morbid obesity with a weight of 231 today, hyperlipidemia-uncontrolled, hypertension-uncontrolled, [id number], SCOTT, GERD, mild asthma, history of tobacco abuse quitting smoking in approximately 2013 had a half a pack a day.     He reports for the last 6 to 12 months, he has been having substernal chest pressure with radiation to his neck, left shoulder, and down his left arm. Generally, this is brought on by emotional stress.  More recently, he has seen increased episodes with activity.  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  He works in a building of 7 people.  He states at times this can be labor-intensive.  With cutting this paper, he describes a tightness to his chest as outlined below.  Other times, his discomfort has a sharp component.  His chest discomfort is more often brought on by anxiety and stress.  He reports his daughter was killed in a motor vehicle accident at age 12, 18 years ago.  This resulted the divorce of his first wife 10 years ago.  He is remarried but has ongoing anxiety.  He states he misses his daughter every day.  His risk factors include history of tobacco abuse quitting approximate 7 years ago which would be around 2013-1/2 pack a day, uncontrolled diabetes mellitus with an A1c of 8.3% on 8/18/2020.  Cholesterol has been severely uncontrolled with a total cholesterol of 262, , and triglycerides of 193 on 11/14/2017.  He has a family history of heart disease with an uncle having bypass today, 8/27/2020.  His mom has history of heart disease and his dad had heart disease with diabetes. With his chest discomfort, he admits to diaphoresis, nausea, shortness of breath, dizziness, but no vomiting.  His  symptoms will last up to 5 minutes.  He has not been on aspirin or sublingual nitro.  His blood pressure today is severely uncontrolled at 196/98.      He went on to have a stress echo on 8/24/2020.  Test was felt to be high risk.  He had mid anterior, basal with mid anterior septal hypokinesis suggesting LAD territory.  Ejection fraction was 55 to 60% and went to 45 to 50% with mild dilatation of the LV cavity.     Based on his symptoms, history, and abnormal stress test, it was suggested he have a cardiac catheterization sooner than later.  [facility].  He will be scheduled for [facility] in a couple of weeks.      Relevant testing:  Stress echo on 8/24/2020:  Abnormal, high-risk exercise stress echocardiogram at sub-maximal exercise capacity.       The target heart rate was not achieved (80% of predicted). At peak exercise,  there is hypokinesis of the mid anterior and basal and mid anteroseptum,  segments. This is suggestive of LAD territory ischemia.   Normal LV size and function at baseline. The LVEF is 55-60% at rest and declined to 45-50% after exercise with mild dilation of the LV cavity.   Heart rate and blood pressure response to exercise were normal.   Normal functional capacity. The patient had chest pain during peak exercise.   The test was terminated due to fatigue.   No significant valvular or aortic abnormalities noted on screening tomograms.       Past Medical History:   Diagnosis Date     Encounter for screening for cardiovascular disorders     02-11, Negative stress test     Essential (primary) hypertension     No Comments Provided     Gastro-esophageal reflux disease without esophagitis     No Comments Provided     Major depressive disorder, single episode     After the death of his daughter in MVA     Mild intermittent asthma, uncomplicated     No Comments Provided     Nicotine dependence, uncomplicated     age [age], 1-2 packs per day, Quit Aug 2005     Suicidal ideations      2013,Hospitalized in [place]       Past Surgical History:   Procedure Laterality Date     LAPAROSCOPIC CHOLECYSTECTOMY      11-05,Dr. Dunne     TYMPANOSTOMY, LOCAL/TOPICAL ANESTHESIA      No Comments Provided       No Known Allergies    Current Outpatient Medications   Medication Sig Dispense Refill     aspirin (ASA) 81 MG chewable tablet Take 1 tablet (81 mg) by mouth daily 90 tablet 3     blood glucose (NO BRAND SPECIFIED) test strip Use to test blood sugar 1 times daily. 100 each 11     blood glucose monitoring (ACCU-CHEK LILIANA PLUS) meter device kit NEEDS METER,STRIPS,LANCETS 1 kit 1     blood glucose monitoring (ACCU-CHEK MULTICLIX) lancets Use to test blood sugar 1 times daily. 102 each 11     carvedilol (COREG) 6.25 MG tablet Take 1 tablet (6.25 mg) by mouth 2 times daily (with meals)       citalopram (CELEXA) 20 MG tablet Take 1 tablet (20 mg) by mouth daily 30 tablet 3     clopidogrel (PLAVIX) 75 MG tablet Take 1 tablet (75 mg) by mouth daily 90 tablet 3     gabapentin (NEURONTIN) 300 MG capsule TAKE TWO CAPSULES BY MOUTH THREE TIMES DAILY 180 capsule 11     glipiZIDE (GLIPIZIDE XL) 10 MG 24 hr tablet Take 1 tablet (10 mg) by mouth daily 90 tablet 3     hydrochlorothiazide (HYDRODIURIL) 25 MG tablet Take 1 tablet (25 mg) by mouth daily 90 tablet 3     lisinopril (PRINIVIL/ZESTRIL) 40 MG tablet Take 1 tablet (40 mg) by mouth daily 90 tablet 3     meloxicam (MOBIC) 15 MG tablet Take 1 tablet (15 mg) by mouth daily 90 tablet 3     metFORMIN (GLUCOPHAGE) 1000 MG tablet Take 1 tablet (1,000 mg) by mouth 2 times daily (with meals) 90 tablet 3     nitroGLYcerin (NITROSTAT) 0.4 MG sublingual tablet For chest pain place 1 tablet under the tongue every 5 minutes for 3 doses. If symptoms persist 5 minutes after 1st dose call 911. 25 tablet 3     omeprazole (PRILOSEC) 40 MG DR capsule Take 1 capsule (40 mg) by mouth daily with food 90 capsule 3     rosuvastatin (CRESTOR) 20 MG tablet Take 1 tablet (20 mg) by mouth  daily 90 tablet 3       Social History     Socioeconomic History     Marital status: Legally      Spouse name: Not on file     Number of children: Not on file     Years of education: Not on file     Highest education level: Not on file   Occupational History     Not on file   Social Needs     Financial resource strain: Not on file     Food insecurity     Worry: Not on file     Inability: Not on file     Transportation needs     Medical: Not on file     Non-medical: Not on file   Tobacco Use     Smoking status: Former Smoker     Packs/day: 0.00     Years: 20.00     Pack years: 0.00     Types: Cigarettes     Last attempt to quit: 2013     Years since quittin.1     Smokeless tobacco: Never Used   Substance and Sexual Activity     Alcohol use: Yes     Comment: Alcoholic Drinks/day: occasionally-rare 4 tomes a year     Drug use: Never     Types: Other     Comment: Drug use: No     Sexual activity: Not Currently   Lifestyle     Physical activity     Days per week: Not on file     Minutes per session: Not on file     Stress: Not on file   Relationships     Social connections     Talks on phone: Not on file     Gets together: Not on file     Attends Orthodox service: Not on file     Active member of club or organization: Not on file     Attends meetings of clubs or organizations: Not on file     Relationship status: Not on file     Intimate partner violence     Fear of current or ex partner: Not on file     Emotionally abused: Not on file     Physically abused: Not on file     Forced sexual activity: Not on file   Other Topics Concern     Parent/sibling w/ CABG, MI or angioplasty before 65F 55M? Not Asked   Social History Narrative    .  Two sons.      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      Works as a  at [employer].      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       LAB RESULTS:   Transferred  Records on 09/09/2020   Component Date Value Ref Range Status     Potassium 09/09/2020 3.7  3.5 - 5.1 mmol/L Final     Glucose 09/09/2020 151* 60 - 99 mg/dL Final     Creatinine 09/09/2020 0.90  0.80 - 1.50 mg/dL Final     GFR Estimate 09/09/2020 >60  SEE SCAN ml/min/1.73m2 Final   Allied Health/Nurse Visit on 09/05/2020   Component Date Value Ref Range Status     COVID-19 Virus PCR to [facility] - So* 09/05/2020 Nasopharyngeal   Final     COVID-19 Virus PCR to [facility] - Re* 09/05/2020 Not Detected   Final   Orders Only on 09/02/2020   Component Date Value Ref Range Status     Sodium 09/02/2020 141  134 - 144 mmol/L Final     Potassium 09/02/2020 3.9  3.5 - 5.1 mmol/L Final     Chloride 09/02/2020 99  98 - 107 mmol/L Final     Carbon Dioxide 09/02/2020 33* 21 - 31 mmol/L Final     Anion Gap 09/02/2020 9  3 - 14 mmol/L Final     Glucose 09/02/2020 187* 70 - 105 mg/dL Final     Urea Nitrogen 09/02/2020 12  7 - 25 mg/dL Final     Creatinine 09/02/2020 1.04  0.70 - 1.30 mg/dL Final     GFR Estimate 09/02/2020 77  >60 mL/min/[1.73_m2] Final     GFR Estimate If Black 09/02/2020 >90  >60 mL/min/[1.73_m2] Final     Calcium 09/02/2020 9.7  8.6 - 10.3 mg/dL Final     WBC 09/02/2020 4.5  4.0 - 11.0 10e9/L Final     RBC Count 09/02/2020 5.41  4.4 - 5.9 10e12/L Final     Hemoglobin 09/02/2020 15.6  13.3 - 17.7 g/dL Final     Hematocrit 09/02/2020 46.4  40.0 - 53.0 % Final     MCV 09/02/2020 86  78 - 100 fl Final     MCH 09/02/2020 28.8  26.5 - 33.0 pg Final     MCHC 09/02/2020 33.6  31.5 - 36.5 g/dL Final     RDW 09/02/2020 11.9  10.0 - 15.0 % Final     Platelet Count 09/02/2020 157  150 - 450 10e9/L Final   Office Visit on 08/27/2020   Component Date Value Ref Range Status     Interpretation ECG 08/27/2020 Click View Image link to view waveform and result   Final   Office Visit on 08/18/2020   Component Date Value Ref Range Status     Hemoglobin A1C 08/18/2020 8.3* 4.0 - 6.0 % Final        Review of systems: Negative except that  which was noted in the HPI.    Physical examination:  /84 (BP Location: Right arm, Patient Position: Sitting, Cuff Size: Adult Large)   Pulse 65   Temp 96  F (35.6  C) (Tympanic)   Resp 18   Wt 106.6 kg (235 lb)   SpO2 98%   BMI 32.90 kg/m      GENERAL APPEARANCE: healthy, alert and no distress  HEENT: no icterus, no xanthelasmas, normal pupil size and reaction, no cyanosis.  NECK: no adenopathy, no asymmetry, masses, or scars.  CHEST: lungs clear to auscultation - no rales, rhonchi or wheezes, no use of accessory muscles, no retractions, respirations are unlabored, normal respiratory rate  CARDIOVASCULAR: regular rhythm, normal S1 with physiologic split S2, no S3 or S4 and no murmur, click or rub  ABDOMEN: soft, non tender, bowel sounds normal  EXTREMITIES: no clubbing, cyanosis or edema  NEURO: alert and oriented normal speech, and affect  VASC: No vascular bruits heard.  SKIN: no ecchymoses, no rashes      Thank you for allowing me to participate in the care of your patient. Please do not hesitate to contact me if you have any questions.     Sg Ortiz, DO

## 2020-09-24 NOTE — PATIENT INSTRUCTIONS
You were seen by  Sg Ortiz,        1. Laboratory blood work has been ordered.  You will be notified by phone call or [id number] message when the results are available.       2. Medication sent your pharmacy.    3. No other changes at this time.      You will follow up with [facility] in 1 month, sooner if needed.       Please call the cardiology office with problems, questions, or concerns at [phone].    If you experience chest pain, chest pressure, chest tightness, shortness of breath, fainting, lightheadedness, nausea, vomiting, or other concerning symptoms, please report to the Emergency Department or call 911. These symptoms may be emergent, and best treated in the Emergency Department.     Cardiology Nurses  SAMINA Schmidt, SHABBIR COOMBS LPN  [facility] (Unit [address])  [phone]

## 2020-09-24 NOTE — LETTER
September 24, 2020      Charles Leopold Matter  [address] [facility] [id number]        Dear ,    We are writing to inform you of your test results.     Your glucose/sugar was significantly elevated at 207.  Your hemoglobin dropped from 15.6 down to 13.7.  13.7 it is still within the normal range.  But as mentioned today, if you continue to have dark, black, maroon, or red stools, you need to be evaluated for bleeding.  If you become short of breath, tired, pale, weak, or fatigued, you also need to be evaluated.  You may want to consider checking your stool for blood in the future.  Let me know if you would like to obtain this testing.      Resulted Orders   Basic metabolic panel   Result Value Ref Range    Sodium 138 134 - 144 mmol/L    Potassium 3.7 3.5 - 5.1 mmol/L    Chloride 101 98 - 107 mmol/L    Carbon Dioxide 28 21 - 31 mmol/L    Anion Gap 9 3 - 14 mmol/L    Glucose 207 (H) 70 - 105 mg/dL    Urea Nitrogen 12 7 - 25 mg/dL    Creatinine 0.92 0.70 - 1.30 mg/dL    GFR Estimate 88 >60 mL/min/[1.73_m2]    GFR Estimate If Black >90 >60 mL/min/[1.73_m2]    Calcium 8.9 8.6 - 10.3 mg/dL   CBC with platelets   Result Value Ref Range    WBC 5.3 4.0 - 11.0 10e9/L    RBC Count 4.77 4.4 - 5.9 10e12/L    Hemoglobin 13.7 13.3 - 17.7 g/dL    Hematocrit 40.3 40.0 - 53.0 %    MCV 85 78 - 100 fl    MCH 28.7 26.5 - 33.0 pg    MCHC 34.0 31.5 - 36.5 g/dL    RDW 11.9 10.0 - 15.0 %    Platelet Count 166 150 - 450 10e9/L        If you have any questions or concerns, please call the clinic at the number listed above.       Sincerely,        Sg Ortiz, DO

## 2020-09-24 NOTE — NURSING NOTE
"Patient comes in for follow up after angiogram.  Judit Jane LPN ....................9/24/2020   2:18 PM  Chief Complaint   Patient presents with     Follow Up       Initial /84 (BP Location: Right arm, Patient Position: Sitting, Cuff Size: Adult Large)   Pulse 65   Temp 96  F (35.6  C) (Tympanic)   Resp 18   Wt 106.6 kg (235 lb)   SpO2 98%   BMI 32.90 kg/m   Estimated body mass index is 32.9 kg/m  as calculated from the following:    Height as of 9/21/20: 1.8 m (5' 10.87\").    Weight as of this encounter: 106.6 kg (235 lb).  Meds Reconciled: complete  Pt is on Aspirin  Pt is on a Statin  PHQ and/or MARGY reviewed. Pt referred to PCP/MH Provider as appropriate.    Judit Jane LPN      "

## 2020-09-25 ENCOUNTER — HOSPITAL ENCOUNTER (OUTPATIENT)
Dept: CARDIAC REHAB | Facility: OTHER | Age: 45
End: 2020-09-25
Attending: INTERNAL MEDICINE
Payer: COMMERCIAL

## 2020-09-25 PROCEDURE — 93798 PHYS/QHP OP CAR RHAB W/ECG: CPT

## 2020-09-25 PROCEDURE — 40000116 ZZH STATISTIC OP CR VISIT

## 2020-09-28 ENCOUNTER — HOSPITAL ENCOUNTER (OUTPATIENT)
Dept: CARDIAC REHAB | Facility: OTHER | Age: 45
End: 2020-09-28
Attending: INTERNAL MEDICINE
Payer: COMMERCIAL

## 2020-09-28 PROCEDURE — 93798 PHYS/QHP OP CAR RHAB W/ECG: CPT

## 2020-09-28 PROCEDURE — 40000116 ZZH STATISTIC OP CR VISIT

## 2020-09-30 ENCOUNTER — HOSPITAL ENCOUNTER (OUTPATIENT)
Dept: CARDIAC REHAB | Facility: OTHER | Age: 45
End: 2020-09-30
Attending: INTERNAL MEDICINE
Payer: COMMERCIAL

## 2020-09-30 PROCEDURE — 40000116 ZZH STATISTIC OP CR VISIT

## 2020-09-30 PROCEDURE — 93798 PHYS/QHP OP CAR RHAB W/ECG: CPT

## 2020-10-02 ENCOUNTER — HOSPITAL ENCOUNTER (OUTPATIENT)
Dept: CARDIAC REHAB | Facility: OTHER | Age: 45
End: 2020-10-02
Attending: INTERNAL MEDICINE
Payer: COMMERCIAL

## 2020-10-02 PROCEDURE — 93798 PHYS/QHP OP CAR RHAB W/ECG: CPT

## 2020-10-02 PROCEDURE — 999N000109 HC STATISTIC OP CR VISIT

## 2020-10-05 ENCOUNTER — HOSPITAL ENCOUNTER (OUTPATIENT)
Dept: CARDIAC REHAB | Facility: OTHER | Age: 45
End: 2020-10-05
Attending: INTERNAL MEDICINE
Payer: COMMERCIAL

## 2020-10-05 PROCEDURE — 999N000109 HC STATISTIC OP CR VISIT

## 2020-10-05 PROCEDURE — 93798 PHYS/QHP OP CAR RHAB W/ECG: CPT

## 2020-10-07 ENCOUNTER — HOSPITAL ENCOUNTER (OUTPATIENT)
Dept: CARDIAC REHAB | Facility: OTHER | Age: 45
End: 2020-10-07
Attending: INTERNAL MEDICINE
Payer: COMMERCIAL

## 2020-10-07 PROCEDURE — 93798 PHYS/QHP OP CAR RHAB W/ECG: CPT

## 2020-10-07 PROCEDURE — 999N000109 HC STATISTIC OP CR VISIT

## 2020-10-12 ENCOUNTER — HOSPITAL ENCOUNTER (OUTPATIENT)
Dept: CARDIAC REHAB | Facility: OTHER | Age: 45
End: 2020-10-12
Attending: INTERNAL MEDICINE
Payer: COMMERCIAL

## 2020-10-12 PROCEDURE — 93798 PHYS/QHP OP CAR RHAB W/ECG: CPT

## 2020-10-12 PROCEDURE — 999N000109 HC STATISTIC OP CR VISIT

## 2020-10-14 ENCOUNTER — HOSPITAL ENCOUNTER (OUTPATIENT)
Dept: CARDIAC REHAB | Facility: OTHER | Age: 45
End: 2020-10-14
Attending: INTERNAL MEDICINE
Payer: COMMERCIAL

## 2020-10-14 PROCEDURE — 999N000109 HC STATISTIC OP CR VISIT

## 2020-10-14 PROCEDURE — 93798 PHYS/QHP OP CAR RHAB W/ECG: CPT

## 2020-10-16 ENCOUNTER — HOSPITAL ENCOUNTER (OUTPATIENT)
Dept: CARDIAC REHAB | Facility: OTHER | Age: 45
End: 2020-10-16
Attending: INTERNAL MEDICINE
Payer: COMMERCIAL

## 2020-10-16 PROCEDURE — 93798 PHYS/QHP OP CAR RHAB W/ECG: CPT

## 2020-10-16 PROCEDURE — 999N000109 HC STATISTIC OP CR VISIT

## 2020-10-21 ENCOUNTER — HOSPITAL ENCOUNTER (OUTPATIENT)
Dept: CARDIAC REHAB | Facility: OTHER | Age: 45
End: 2020-10-21
Attending: INTERNAL MEDICINE
Payer: COMMERCIAL

## 2020-10-21 ENCOUNTER — OFFICE VISIT (OUTPATIENT)
Dept: NEUROLOGY | Facility: OTHER | Age: 45
End: 2020-10-21
Attending: PSYCHIATRY & NEUROLOGY
Payer: COMMERCIAL

## 2020-10-21 VITALS
RESPIRATION RATE: 16 BRPM | BODY MASS INDEX: 33.46 KG/M2 | OXYGEN SATURATION: 98 % | SYSTOLIC BLOOD PRESSURE: 142 MMHG | DIASTOLIC BLOOD PRESSURE: 90 MMHG | WEIGHT: 239 LBS | HEART RATE: 77 BPM | HEIGHT: 71 IN

## 2020-10-21 DIAGNOSIS — G83.34 MONOPLEGIA AFFECTING LEFT NONDOMINANT SIDE (H): ICD-10-CM

## 2020-10-21 DIAGNOSIS — G56.03 BILATERAL CARPAL TUNNEL SYNDROME: Primary | ICD-10-CM

## 2020-10-21 DIAGNOSIS — G83.31 MONOPLEGIA AFFECTING RIGHT DOMINANT SIDE (H): ICD-10-CM

## 2020-10-21 PROCEDURE — 95913 NRV CNDJ TEST 13/> STUDIES: CPT | Mod: 26 | Performed by: PSYCHIATRY & NEUROLOGY

## 2020-10-21 PROCEDURE — G0463 HOSPITAL OUTPT CLINIC VISIT: HCPCS | Mod: 25

## 2020-10-21 PROCEDURE — 95913 NRV CNDJ TEST 13/> STUDIES: CPT | Performed by: PSYCHIATRY & NEUROLOGY

## 2020-10-21 PROCEDURE — 95886 MUSC TEST DONE W/N TEST COMP: CPT | Performed by: PSYCHIATRY & NEUROLOGY

## 2020-10-21 PROCEDURE — 999N000109 HC STATISTIC OP CR VISIT

## 2020-10-21 PROCEDURE — 93798 PHYS/QHP OP CAR RHAB W/ECG: CPT

## 2020-10-21 PROCEDURE — 95886 MUSC TEST DONE W/N TEST COMP: CPT | Mod: 26 | Performed by: PSYCHIATRY & NEUROLOGY

## 2020-10-21 PROCEDURE — G0463 HOSPITAL OUTPT CLINIC VISIT: HCPCS

## 2020-10-21 PROCEDURE — 99202 OFFICE O/P NEW SF 15 MIN: CPT | Mod: 25 | Performed by: PSYCHIATRY & NEUROLOGY

## 2020-10-21 ASSESSMENT — MIFFLIN-ST. JEOR: SCORE: 1973.29

## 2020-10-21 NOTE — LETTER
"    10/21/2020         RE: Charles Leopold Matter  [address] [facility] [address]        Dear Colleague,    Thank you for referring your patient, Charles Leopold Matter, to the [facility]. Please see a copy of my visit note below.    Chief Complaint   Patient presents with     Consult     Bilateral Upper Arm Weakness        Initial There were no vitals taken for this visit. Estimated body mass index is 32.9 kg/m  as calculated from the following:    Height as of 9/21/20: 1.8 m (5' 10.87\").    Weight as of 9/24/20: 106.6 kg (235 lb).  Medication Reconciliation: complete      Rasheeda Rock LPN on 10/21/2020 at 10:57 AM      Visit Date:   10/21/2020      NEURODIAGNOSTICS CONSULTATION      REFERRING PHYSICIAN:  Dr. Szymanski.      HISTORY OF PRESENT ILLNESS:  The patient is a 45-year-old man who provides a vague history of increasing problems with discomfort in the hands and forearms with loss of strength in the hands.  This has been going on for 1 or 2 years.  He has been diabetic for about 3 years.  He had left carpal tunnel release in 2006 and he says that this went well.  Currently, his symptoms are said to be somewhat similar to what he was experiencing prior to that surgery.  He describes sensory loss and paresthesia in the first 3 digits of both hands.      PAST MEDICAL HISTORY:  Significant for numerous chronic medical illnesses largely self-inflicted.  Has an extensive history of smoking and has developed COPD.  He did quit in 2013.  He has been chronically overweight and recently diagnosed with diabetes which has been reportedly under fairly good control.  He has coronary artery disease and hypertension.      REVIEW OF SYSTEMS:  A 10-system review of systems other than the above is negative.      FAMILY HISTORY:  Positive for diabetes affecting just 1 first-degree relative.      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    "   PHYSICAL EXAMINATION:  Reveals a 45-year-old in no apparent distress.  Strength testing was difficult because of incomplete and inconsistent effort.  This was present for both upper extremities.  Paradoxically, he ultimately showed normal strength for the upper extremities during EMG, essentially confirming that the apparent weakness was functional.  Reflexes were normoactive and symmetric in the upper extremities.  Pinprick was well preserved.  The gait was normal.      NERVE CONDUCTION STUDIES:  Motor nerve conduction testing was performed bilaterally for the median and ulnar nerves.  Both median nerves showed latency prolongation.  There was conduction block for both nerves.  There was moderate slowing for the right median nerve at the carpal tunnel and severe slowing for the left.  The H-reflex latencies were normal throughout.      Orthodromic mixed conduction studies were performed bilaterally for the median and ulnar nerves.  Antidromic sensory testing was performed for the left second digital nerve.  Waveforms were absent for both the median nerve and the second digital nerve on the left side.  The right median nerve showed moderate latency prolongation with moderately severe slowing through the wrist.  The ulnar studies were normal.      MONOPOLAR EMG NEEDLE EXAMINATION:  Monopolar needle exam was performed bilaterally for the first dorsal interosseous, extensor digitorum communis, flexor carpi radialis, triceps and brachioradialis.  All of the tested muscles showed normal insertional activity.  Motor units were normal in size with normal recruitment and interference.      IMPRESSION:  The patient has moderately severe right carpal tunnel syndrome and recurrence of carpal tunnel syndrome on the left.  The left side would be graded as severe.  On both sides, the degree of conduction block seen for the median nerve is great enough that resolution of the carpal tunnel syndrome cannot be expected with  nonsurgical treatment.  Other than carpal tunnel syndrome, he looks to be neurologically healthy.  As noted above, he showed very inconsistent effort during strength testing.  There is no evidence for other focal neuropathy nor for general neuropathy or radiculopathy.      Findings were reviewed with the patient.         ED ALEXANDRE MD             D: 10/21/2020   T: 10/21/2020   MT: NTS      Name:     BRODIE GILLIS   MRN:      [id number]        Account:      [id number]   :      1975           Visit Date:   10/21/2020      Document: [id number]       cc: Sg Szymanski MD       Again, thank you for allowing me to participate in the care of your patient.        Sincerely,        Ed Alexandre MD

## 2020-10-21 NOTE — PROGRESS NOTES
Visit Date:   10/21/2020      NEURODIAGNOSTICS CONSULTATION      REFERRING PHYSICIAN:  Dr. Szymanski.      HISTORY OF PRESENT ILLNESS:  The patient is a 45-year-old man who provides a vague history of increasing problems with discomfort in the hands and forearms with loss of strength in the hands.  This has been going on for 1 or 2 years.  He has been diabetic for about 3 years.  He had left carpal tunnel release in 2006 and he says that this went well.  Currently, his symptoms are said to be somewhat similar to what he was experiencing prior to that surgery.  He describes sensory loss and paresthesia in the first 3 digits of both hands.      PAST MEDICAL HISTORY:  Significant for numerous chronic medical illnesses largely self-inflicted.  Has an extensive history of smoking and has developed COPD.  He did quit in 2013.  He has been chronically overweight and recently diagnosed with diabetes which has been reportedly under fairly good control.  He has coronary artery disease and hypertension.      REVIEW OF SYSTEMS:  A 10-system review of systems other than the above is negative.      FAMILY HISTORY:  Positive for diabetes affecting just 1 first-degree relative.      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      PHYSICAL EXAMINATION:  Reveals a 45-year-old in no apparent distress.  Strength testing was difficult because of incomplete and inconsistent effort.  This was present for both upper extremities.  Paradoxically, he ultimately showed normal strength for the upper extremities during EMG, essentially confirming that the apparent weakness was functional.  Reflexes were normoactive and symmetric in the upper extremities.  Pinprick was well preserved.  The gait was normal.      NERVE CONDUCTION STUDIES:  Motor nerve conduction testing was performed bilaterally for the median and ulnar nerves.  Both median nerves showed latency prolongation.  There was conduction block for  both nerves.  There was moderate slowing for the right median nerve at the carpal tunnel and severe slowing for the left.  The H-reflex latencies were normal throughout.      Orthodromic mixed conduction studies were performed bilaterally for the median and ulnar nerves.  Antidromic sensory testing was performed for the left second digital nerve.  Waveforms were absent for both the median nerve and the second digital nerve on the left side.  The right median nerve showed moderate latency prolongation with moderately severe slowing through the wrist.  The ulnar studies were normal.      MONOPOLAR EMG NEEDLE EXAMINATION:  Monopolar needle exam was performed bilaterally for the first dorsal interosseous, extensor digitorum communis, flexor carpi radialis, triceps and brachioradialis.  All of the tested muscles showed normal insertional activity.  Motor units were normal in size with normal recruitment and interference.      IMPRESSION:  The patient has moderately severe right carpal tunnel syndrome and recurrence of carpal tunnel syndrome on the left.  The left side would be graded as severe.  On both sides, the degree of conduction block seen for the median nerve is great enough that resolution of the carpal tunnel syndrome cannot be expected with nonsurgical treatment.  Other than carpal tunnel syndrome, he looks to be neurologically healthy.  As noted above, he showed very inconsistent effort during strength testing.  There is no evidence for other focal neuropathy nor for general neuropathy or radiculopathy.      Findings were reviewed with the patient.         JOHNIE ALEXANDRE MD             D: 10/21/2020   T: 10/21/2020   MT: CHUNG      Name:     BRODIE GILLIS   MRN:      [id number]        Account:      [id number]   :      1975           Visit Date:   10/21/2020      Document: [id number]       cc: Sg Szymanski MD

## 2020-10-21 NOTE — PROGRESS NOTES
"Chief Complaint   Patient presents with     Consult     Bilateral Upper Arm Weakness        Initial There were no vitals taken for this visit. Estimated body mass index is 32.9 kg/m  as calculated from the following:    Height as of 9/21/20: 1.8 m (5' 10.87\").    Weight as of 9/24/20: 106.6 kg (235 lb).  Medication Reconciliation: complete      Rasheeda Rock LPN on 10/21/2020 at 10:57 AM    "

## 2020-10-22 ENCOUNTER — OFFICE VISIT (OUTPATIENT)
Dept: CARDIOLOGY | Facility: OTHER | Age: 45
End: 2020-10-22
Attending: FAMILY MEDICINE
Payer: COMMERCIAL

## 2020-10-22 VITALS
HEIGHT: 71 IN | SYSTOLIC BLOOD PRESSURE: 138 MMHG | HEART RATE: 73 BPM | RESPIRATION RATE: 18 BRPM | DIASTOLIC BLOOD PRESSURE: 86 MMHG | OXYGEN SATURATION: 97 % | TEMPERATURE: 95 F | BODY MASS INDEX: 33.74 KG/M2 | WEIGHT: 241 LBS

## 2020-10-22 DIAGNOSIS — G47.33 OSA (OBSTRUCTIVE SLEEP APNEA): ICD-10-CM

## 2020-10-22 DIAGNOSIS — E11.9 TYPE 2 DIABETES MELLITUS WITHOUT COMPLICATION, WITHOUT LONG-TERM CURRENT USE OF INSULIN (H): ICD-10-CM

## 2020-10-22 DIAGNOSIS — J45.909 MILD ASTHMA WITHOUT COMPLICATION, UNSPECIFIED WHETHER PERSISTENT: ICD-10-CM

## 2020-10-22 DIAGNOSIS — R07.89 OTHER CHEST PAIN: ICD-10-CM

## 2020-10-22 DIAGNOSIS — K21.9 GASTROESOPHAGEAL REFLUX DISEASE WITHOUT ESOPHAGITIS: ICD-10-CM

## 2020-10-22 DIAGNOSIS — R94.39 ABNORMAL STRESS TEST: ICD-10-CM

## 2020-10-22 DIAGNOSIS — E66.01 MORBID OBESITY (H): ICD-10-CM

## 2020-10-22 DIAGNOSIS — Z87.891 HISTORY OF TOBACCO ABUSE: ICD-10-CM

## 2020-10-22 DIAGNOSIS — Z98.890 S/P CARDIAC CATH: ICD-10-CM

## 2020-10-22 DIAGNOSIS — Z95.5 HISTORY OF CORONARY ARTERY STENT PLACEMENT: ICD-10-CM

## 2020-10-22 DIAGNOSIS — E78.2 MIXED HYPERLIPIDEMIA: ICD-10-CM

## 2020-10-22 DIAGNOSIS — I10 ESSENTIAL HYPERTENSION: ICD-10-CM

## 2020-10-22 DIAGNOSIS — I25.10 CORONARY ARTERY DISEASE INVOLVING NATIVE CORONARY ARTERY OF NATIVE HEART WITHOUT ANGINA PECTORIS: Primary | ICD-10-CM

## 2020-10-22 PROCEDURE — 99214 OFFICE O/P EST MOD 30 MIN: CPT | Performed by: INTERNAL MEDICINE

## 2020-10-22 PROCEDURE — G0463 HOSPITAL OUTPT CLINIC VISIT: HCPCS

## 2020-10-22 ASSESSMENT — MIFFLIN-ST. JEOR: SCORE: 1988.17

## 2020-10-22 ASSESSMENT — PATIENT HEALTH QUESTIONNAIRE - PHQ9: SUM OF ALL RESPONSES TO PHQ QUESTIONS 1-9: 6

## 2020-10-22 ASSESSMENT — PAIN SCALES - GENERAL: PAINLEVEL: NO PAIN (0)

## 2020-10-22 NOTE — PROGRESS NOTES
[facility]   CARDIOLOGY PROGRESS NOTE     Chief Complaint   Patient presents with     Follow Up     one month          Diagnosis:  1.  S/P cardiac cath at [facility] on 9/8/2020.  He had x2 stents placed to ostial LAD.  2.  Abnormal stress test on 8/24/2020.  3.  CAD  4.  Morbid obesity  5.  Hyperlipidemia.  6.  Hypertension.  7.  DM-2.  8.  SCOTT.  9.  GERD  10.  Mild asthma  11.  History of tobacco abuse quitting in 2013-1/2 pack a day.    Assessment/Plan:    1.  Continue Plavix until 9/9/2021.  They were not able to get Prasugrel.  2.  Reviewed CBC and BMP today with concerns for dark loose stools, potentially related to GI bleed.  Hemoglobin remains in normal range but has dropped x2 points.  Discussed the possibility of ongoing bleeding.  Suggest referral to general surgery with Hemoccult.  This was declined.  3.  Recommended Hemoccult but declined.  4.  Reassured about chest pain.  Chest pain is musculoskeletal as reproducible with palpation and deep breathing.  He states his job is labor intensive and has not been doing it for a while.  His muscles are sore, as a result.    5.  Follow-up in 1 yr or sooner with issues.        Interval history:  Herb was referred to [facility] secondary to a grossly abnormal stress test on 8/24/2020.  He had wall motion abnormalities consistent with LAD territory ischemia.  EF went from 50-55% at rest to 45-50% with activity with dilation of the LV cavity.  This resulted in x2 stents to his LAD secondary to a 70% lesion.  The rest of the vessels were found to have mild disease. Chest pain is entirely reproduced with palpation.  Discomfort made worse with movements at work.  Deep breathing makes his pain worse.  Overall, his chest comfort has improved.  His pain is also exacerbated by stress/anxiety.  His legs have been weak and tired following cardiac rehab.  He has been able to increase his endurance and speed on the treadmill.  He continues to have intermittent dark  stools with discomfort to his abdomen.  Certainly, there is concern for acid reflux, ulcer, and GI bleeding.  CBC obtained at his last visit.  Hemoglobin dropped from 15.6-13.7, still within the normal range.  He declined Hemoccult.  Suggested referral to general surgery for colonoscopy but declined.  Patient does not need refills today.  He has no other issues.      HPI:    Mr. Figueroa is being seen by cardiology for stable angina.  He had a abnormal stress echo suggesting LAD stenosis on 8/24/2020.  There is also history of morbid obesity with a weight of 231 today, hyperlipidemia-uncontrolled, hypertension-uncontrolled, XE-1-kgphokaqtyhd, SCOTT, GERD, mild asthma, history of tobacco abuse quitting smoking in approximately 2013 had a half a pack a day.     He reports for the last 6 to 12 months, he has been having substernal chest pressure with radiation to his neck, left shoulder, and down his left arm. Generally, this is brought on by emotional stress.  More recently, he has seen increased episodes with activity.  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  He works in a building of 7 people.  He states at times this can be labor-intensive.  With cutting this paper, he describes a tightness to his chest as outlined below.  Other times, his discomfort has a sharp component.  His chest discomfort is more often brought on by anxiety and stress.  He reports his daughter was killed in a motor vehicle accident at age 12, 18 years ago.  This resulted the divorce of his first wife 10 years ago.  He is remarried but has ongoing anxiety.  He states he misses his daughter every day.  His risk factors include history of tobacco abuse quitting approximate 7 years ago which would be around 2013-1/2 pack a day, uncontrolled diabetes mellitus with an A1c of 8.3% on 8/18/2020.  Cholesterol has been severely  uncontrolled with a total cholesterol of 262, , and triglycerides of 193 on 11/14/2017.  He has a family history of heart disease with an uncle having bypass today, 8/27/2020.  His mom has history of heart disease and his dad had heart disease with diabetes. With his chest discomfort, he admits to diaphoresis, nausea, shortness of breath, dizziness, but no vomiting.  His symptoms will last up to 5 minutes.  He has not been on aspirin or sublingual nitro.  His blood pressure today is severely uncontrolled at 196/98.      He went on to have a stress echo on 8/24/2020.  Test was felt to be high risk.  He had mid anterior, basal with mid anterior septal hypokinesis suggesting LAD territory.  Ejection fraction was 55 to 60% and went to 45 to 50% with mild dilatation of the LV cavity.     Based on his symptoms, history, and abnormal stress test, it was suggested he have a cardiac catheterization sooner than later.  [facility] is booked out a month.  He will be scheduled for [facility] in a couple of weeks.      Relevant testing:  Stress echo on 8/24/2020:  Abnormal, high-risk exercise stress echocardiogram at sub-maximal exercise capacity.       The target heart rate was not achieved (80% of predicted). At peak exercise,  there is hypokinesis of the mid anterior and basal and mid anteroseptum,  segments. This is suggestive of LAD territory ischemia.   Normal LV size and function at baseline. The LVEF is 55-60% at rest and declined to 45-50% after exercise with mild dilation of the LV cavity.   Heart rate and blood pressure response to exercise were normal.   Normal functional capacity. The patient had chest pain during peak exercise.   The test was terminated due to fatigue.   No significant valvular or aortic abnormalities noted on screening tomograms.       Past Medical History:   Diagnosis Date     Encounter for screening for cardiovascular disorders     02-11, Negative stress test     Essential (primary)  hypertension     No Comments Provided     Gastro-esophageal reflux disease without esophagitis     No Comments Provided     Major depressive disorder, single episode     After the death of his daughter in MVA     Mild intermittent asthma, uncomplicated     No Comments Provided     Nicotine dependence, uncomplicated     age [age], 1-2 packs per day, Quit Aug 2005     Suicidal ideations     2013,Hospitalized in [place]       Past Surgical History:   Procedure Laterality Date     LAPAROSCOPIC CHOLECYSTECTOMY      11-05,Dr. Dunne     TYMPANOSTOMY, LOCAL/TOPICAL ANESTHESIA      No Comments Provided       No Known Allergies    Current Outpatient Medications   Medication Sig Dispense Refill     aspirin (ASA) 81 MG chewable tablet Take 1 tablet (81 mg) by mouth daily 90 tablet 3     blood glucose (NO BRAND SPECIFIED) test strip Use to test blood sugar 1 times daily. 100 each 11     blood glucose monitoring (ACCU-CHEK LILIANA PLUS) meter device kit NEEDS METER,STRIPS,LANCETS 1 kit 1     blood glucose monitoring (ACCU-CHEK MULTICLIX) lancets Use to test blood sugar 1 times daily. 102 each 11     carvedilol (COREG) 6.25 MG tablet Take 1 tablet (6.25 mg) by mouth 2 times daily (with meals)       citalopram (CELEXA) 20 MG tablet Take 1 tablet (20 mg) by mouth daily 30 tablet 3     clopidogrel (PLAVIX) 75 MG tablet Take 1 tablet (75 mg) by mouth daily 90 tablet 3     gabapentin (NEURONTIN) 300 MG capsule TAKE TWO CAPSULES BY MOUTH THREE TIMES DAILY 180 capsule 11     glipiZIDE (GLIPIZIDE XL) 10 MG 24 hr tablet Take 1 tablet (10 mg) by mouth daily 90 tablet 3     hydrochlorothiazide (HYDRODIURIL) 25 MG tablet Take 1 tablet (25 mg) by mouth daily 90 tablet 3     lisinopril (PRINIVIL/ZESTRIL) 40 MG tablet Take 1 tablet (40 mg) by mouth daily 90 tablet 3     meloxicam (MOBIC) 15 MG tablet Take 1 tablet (15 mg) by mouth daily 90 tablet 3     metFORMIN (GLUCOPHAGE) 1000 MG tablet Take 1 tablet (1,000 mg) by mouth 2 times daily (with  meals) 90 tablet 3     nitroGLYcerin (NITROSTAT) 0.4 MG sublingual tablet For chest pain place 1 tablet under the tongue every 5 minutes for 3 doses. If symptoms persist 5 minutes after 1st dose call 911. 25 tablet 3     omeprazole (PRILOSEC) 40 MG DR capsule Take 1 capsule (40 mg) by mouth daily with food 90 capsule 3     rosuvastatin (CRESTOR) 20 MG tablet Take 1 tablet (20 mg) by mouth daily 90 tablet 3       Social History     Socioeconomic History     Marital status: Legally      Spouse name: Not on file     Number of children: Not on file     Years of education: Not on file     Highest education level: Not on file   Occupational History     Not on file   Social Needs     Financial resource strain: Not on file     Food insecurity     Worry: Not on file     Inability: Not on file     Transportation needs     Medical: Not on file     Non-medical: Not on file   Tobacco Use     Smoking status: Former Smoker     Packs/day: 0.00     Years: 20.00     Pack years: 0.00     Types: Cigarettes     Quit date: 2013     Years since quittin.2     Smokeless tobacco: Never Used   Substance and Sexual Activity     Alcohol use: Yes     Comment: Alcoholic Drinks/day: occasionally-rare 4 tomes a year     Drug use: Not Currently     Sexual activity: Not Currently   Lifestyle     Physical activity     Days per week: Not on file     Minutes per session: Not on file     Stress: Not on file   Relationships     Social connections     Talks on phone: Not on file     Gets together: Not on file     Attends Tenriism service: Not on file     Active member of club or organization: Not on file     Attends meetings of clubs or organizations: Not on file     Relationship status: Not on file     Intimate partner violence     Fear of current or ex partner: Not on file     Emotionally abused: Not on file     Physically abused: Not on file     Forced sexual activity: Not on file   Other Topics Concern     Parent/sibling w/ CABG, MI  or angioplasty before 65F 55M? Not Asked   Social History Narrative    .  Two sons.      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      Works as a  at [employer].      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       LAB RESULTS:   Transferred Records on 09/09/2020   Component Date Value Ref Range Status     Potassium 09/09/2020 3.7  3.5 - 5.1 mmol/L Final     Glucose 09/09/2020 151* 60 - 99 mg/dL Final     Creatinine 09/09/2020 0.90  0.80 - 1.50 mg/dL Final     GFR Estimate 09/09/2020 >60  SEE SCAN ml/min/1.73m2 Final   Allied Health/Nurse Visit on 09/05/2020   Component Date Value Ref Range Status     COVID-19 Virus PCR to [facility] - So* 09/05/2020 Nasopharyngeal   Final     COVID-19 Virus PCR to [facility] - Re* 09/05/2020 Not Detected   Final   Orders Only on 09/02/2020   Component Date Value Ref Range Status     Sodium 09/02/2020 141  134 - 144 mmol/L Final     Potassium 09/02/2020 3.9  3.5 - 5.1 mmol/L Final     Chloride 09/02/2020 99  98 - 107 mmol/L Final     Carbon Dioxide 09/02/2020 33* 21 - 31 mmol/L Final     Anion Gap 09/02/2020 9  3 - 14 mmol/L Final     Glucose 09/02/2020 187* 70 - 105 mg/dL Final     Urea Nitrogen 09/02/2020 12  7 - 25 mg/dL Final     Creatinine 09/02/2020 1.04  0.70 - 1.30 mg/dL Final     GFR Estimate 09/02/2020 77  >60 mL/min/[1.73_m2] Final     GFR Estimate If Black 09/02/2020 >90  >60 mL/min/[1.73_m2] Final     Calcium 09/02/2020 9.7  8.6 - 10.3 mg/dL Final     WBC 09/02/2020 4.5  4.0 - 11.0 10e9/L Final     RBC Count 09/02/2020 5.41  4.4 - 5.9 10e12/L Final     Hemoglobin 09/02/2020 15.6  13.3 - 17.7 g/dL Final     Hematocrit 09/02/2020 46.4  40.0 - 53.0 % Final     MCV 09/02/2020 86  78 - 100 fl Final     MCH 09/02/2020 28.8  26.5 - 33.0 pg Final     MCHC 09/02/2020 33.6  31.5 - 36.5 g/dL Final     RDW 09/02/2020 11.9  10.0 - 15.0 % Final     Platelet Count 09/02/2020 157  150 - 450  "10e9/L Final   Office Visit on 08/27/2020   Component Date Value Ref Range Status     Interpretation ECG 08/27/2020 Click View Image link to view waveform and result   Final   Office Visit on 08/18/2020   Component Date Value Ref Range Status     Hemoglobin A1C 08/18/2020 8.3* 4.0 - 6.0 % Final        Review of systems: Negative except that which was noted in the HPI.    Physical examination:  /86 (BP Location: Right arm, Patient Position: Sitting, Cuff Size: Adult Large)   Pulse 73   Temp 95  F (35  C) (Tympanic)   Resp 18   Ht 1.8 m (5' 10.87\")   Wt 109.3 kg (241 lb)   SpO2 97%   BMI 33.74 kg/m      GENERAL APPEARANCE: healthy, alert and no distress  HEENT: no icterus, no xanthelasmas, normal pupil size and reaction, no cyanosis.  NECK: no adenopathy, no asymmetry, masses, or scars.  CHEST: lungs clear to auscultation - no rales, rhonchi or wheezes, no use of accessory muscles, no retractions, respirations are unlabored, normal respiratory rate  CARDIOVASCULAR: regular rhythm, normal S1 with physiologic split S2, no S3 or S4 and no murmur, click or rub  ABDOMEN: soft, non tender, bowel sounds normal  EXTREMITIES: no clubbing, cyanosis or edema  NEURO: alert and oriented normal speech, and affect  VASC: No vascular bruits heard.  SKIN: no ecchymoses, no rashes      Thank you for allowing me to participate in the care of your patient. Please do not hesitate to contact me if you have any questions.     Sg Ortiz, DO          "

## 2020-10-23 ENCOUNTER — HOSPITAL ENCOUNTER (OUTPATIENT)
Dept: CARDIAC REHAB | Facility: OTHER | Age: 45
End: 2020-10-23
Attending: INTERNAL MEDICINE
Payer: COMMERCIAL

## 2020-10-23 ENCOUNTER — OFFICE VISIT (OUTPATIENT)
Dept: FAMILY MEDICINE | Facility: OTHER | Age: 45
End: 2020-10-23
Attending: FAMILY MEDICINE
Payer: COMMERCIAL

## 2020-10-23 VITALS
DIASTOLIC BLOOD PRESSURE: 88 MMHG | OXYGEN SATURATION: 98 % | HEIGHT: 71 IN | WEIGHT: 242.6 LBS | RESPIRATION RATE: 18 BRPM | BODY MASS INDEX: 33.96 KG/M2 | HEART RATE: 72 BPM | TEMPERATURE: 98 F | SYSTOLIC BLOOD PRESSURE: 138 MMHG

## 2020-10-23 DIAGNOSIS — E11.9 TYPE 2 DIABETES MELLITUS WITHOUT COMPLICATION, WITHOUT LONG-TERM CURRENT USE OF INSULIN (H): ICD-10-CM

## 2020-10-23 DIAGNOSIS — F41.9 ANXIETY: Primary | ICD-10-CM

## 2020-10-23 DIAGNOSIS — G56.03 BILATERAL CARPAL TUNNEL SYNDROME: ICD-10-CM

## 2020-10-23 PROCEDURE — 250N000011 HC RX IP 250 OP 636: Performed by: FAMILY MEDICINE

## 2020-10-23 PROCEDURE — 93798 PHYS/QHP OP CAR RHAB W/ECG: CPT

## 2020-10-23 PROCEDURE — 20605 DRAIN/INJ JOINT/BURSA W/O US: CPT | Performed by: FAMILY MEDICINE

## 2020-10-23 PROCEDURE — G0463 HOSPITAL OUTPT CLINIC VISIT: HCPCS | Mod: 25

## 2020-10-23 PROCEDURE — 999N000109 HC STATISTIC OP CR VISIT

## 2020-10-23 PROCEDURE — 99213 OFFICE O/P EST LOW 20 MIN: CPT | Mod: 25 | Performed by: FAMILY MEDICINE

## 2020-10-23 RX ORDER — CITALOPRAM HYDROBROMIDE 20 MG/1
20 TABLET ORAL DAILY
Qty: 90 TABLET | Refills: 3 | Status: SHIPPED | OUTPATIENT
Start: 2020-10-23 | End: 2021-11-02

## 2020-10-23 RX ADMIN — TRIAMCINOLONE ACETONIDE 10 MG: 10 INJECTION, SUSPENSION INTRA-ARTICULAR; INTRALESIONAL at 10:53

## 2020-10-23 RX ADMIN — TRIAMCINOLONE ACETONIDE 10 MG: 10 INJECTION, SUSPENSION INTRA-ARTICULAR; INTRALESIONAL at 10:52

## 2020-10-23 ASSESSMENT — ANXIETY QUESTIONNAIRES
5. BEING SO RESTLESS THAT IT IS HARD TO SIT STILL: MORE THAN HALF THE DAYS
7. FEELING AFRAID AS IF SOMETHING AWFUL MIGHT HAPPEN: MORE THAN HALF THE DAYS
GAD7 TOTAL SCORE: 16
2. NOT BEING ABLE TO STOP OR CONTROL WORRYING: MORE THAN HALF THE DAYS
6. BECOMING EASILY ANNOYED OR IRRITABLE: MORE THAN HALF THE DAYS
1. FEELING NERVOUS, ANXIOUS, OR ON EDGE: NEARLY EVERY DAY
IF YOU CHECKED OFF ANY PROBLEMS ON THIS QUESTIONNAIRE, HOW DIFFICULT HAVE THESE PROBLEMS MADE IT FOR YOU TO DO YOUR WORK, TAKE CARE OF THINGS AT HOME, OR GET ALONG WITH OTHER PEOPLE: SOMEWHAT DIFFICULT
3. WORRYING TOO MUCH ABOUT DIFFERENT THINGS: MORE THAN HALF THE DAYS

## 2020-10-23 ASSESSMENT — PATIENT HEALTH QUESTIONNAIRE - PHQ9
SUM OF ALL RESPONSES TO PHQ QUESTIONS 1-9: 13
5. POOR APPETITE OR OVEREATING: NEARLY EVERY DAY

## 2020-10-23 ASSESSMENT — PAIN SCALES - GENERAL: PAINLEVEL: NO PAIN (0)

## 2020-10-23 ASSESSMENT — MIFFLIN-ST. JEOR: SCORE: 1995.49

## 2020-10-23 NOTE — NURSING NOTE
Patient presents today for 1 month follow up on depression.    Medication Reconciliation Complete    Janeth Garcia LPN  10/23/2020 9:58 AM

## 2020-10-23 NOTE — PROGRESS NOTES
"SUBJECTIVE:  Charles Leopold Matter is a 45 year old male here for follow-up.  He has a history of coronary stenting and continues to do well with cardiac rehab.  He is seen by cardiology yesterday.  They recommend Plavix through next September.    He has a history of type 2 diabetes, last hemoglobin A1c was 8.3% August, he will be due again in November.    He has a history of depression.  He is currently on Celexa 20 mg daily and is doing well.  He does note that he has had sexual side effects including decreased sexual desire.  He is able to handle this at this time but it is concerning.    Finally has a history of bilateral wrist pain.  He was seen by neurology where EMG confirmed bilateral carpal tunnel syndrome.  He has had carpal tunnel release previously and is not interested in repeat surgery.  He is requesting steroid injection as he has had this previously with some good success.    Allergies:  No Known Allergies    ROS:    As above otherwise ROS is unremarkable.    OBJECTIVE:  /88   Pulse 72   Temp 98  F (36.7  C)   Resp 18   Ht 1.8 m (5' 10.87\")   Wt 110 kg (242 lb 9.6 oz)   SpO2 98%   BMI 33.96 kg/m      EXAM:  General Appearance: Pleasant, alert, appropriate appearance for age. No acute distress  Psychiatric Exam: Alert and oriented, appropriate affect.  No psychomotor agitation or retardation.  No thoughts of harming self or others.  No hallucinations.    PHQ 9/21/2020 10/22/2020 10/23/2020   PHQ-9 Total Score 14 6 13   Q9: Thoughts of better off dead/self-harm past 2 weeks Not at all Not at all Not at all   F/U: Thoughts of suicide or self-harm - - -   F/U: Self harm-plan - - -   F/U: Self-harm action - - -   F/U: Safety concerns - - -         ASSESSEMENT AND PLAN:    1. Anxiety    2. Bilateral carpal tunnel syndrome    3. Type 2 diabetes mellitus without complication, without long-term current use of insulin (H)      Is anxiety and depression we will continue Celexa 20 mg daily.  I fill " this for another year.  Discussed that his sexual side effects are quite common but he is not ready to try something different.    For his type 2 diabetes he will follow-up in November for repeat hemoglobin A1c.  Given his decreased sex drive and his low normal testosterone in 2018 we will also repeat testosterone level.    Finally for his bilateral carpal tunnel syndrome we discussed his options.  He is requesting repeat steroid injections.  Verbal informed consent was obtained.  His right wrist was cleansed in normal fashion.  A 2 mL mixture of 10 mg of Kenalog and lidocaine were used infiltrate his carpal tunnel using a palmar approach.  He tolerated this well, no immediate complications.    The procedure was then repeated on his left side as above.  He again tolerated this well, no immediate complications.  He will ice both wrist to prevent flare and follow-up as needed.    William Szymanski MD    This document was prepared using voice generated software.  While every attempt was made for accuracy, grammatical errors may exist.

## 2020-10-24 ASSESSMENT — ANXIETY QUESTIONNAIRES: GAD7 TOTAL SCORE: 16

## 2020-10-26 ENCOUNTER — HOSPITAL ENCOUNTER (OUTPATIENT)
Dept: CARDIAC REHAB | Facility: OTHER | Age: 45
End: 2020-10-26
Attending: INTERNAL MEDICINE
Payer: COMMERCIAL

## 2020-10-26 PROCEDURE — 999N000109 HC STATISTIC OP CR VISIT

## 2020-10-26 PROCEDURE — 93798 PHYS/QHP OP CAR RHAB W/ECG: CPT

## 2020-10-28 ENCOUNTER — HOSPITAL ENCOUNTER (OUTPATIENT)
Dept: CARDIAC REHAB | Facility: OTHER | Age: 45
End: 2020-10-28
Attending: INTERNAL MEDICINE
Payer: COMMERCIAL

## 2020-10-28 PROCEDURE — 999N000109 HC STATISTIC OP CR VISIT

## 2020-10-28 PROCEDURE — 93798 PHYS/QHP OP CAR RHAB W/ECG: CPT

## 2020-11-02 ENCOUNTER — HOSPITAL ENCOUNTER (OUTPATIENT)
Dept: CARDIAC REHAB | Facility: OTHER | Age: 45
End: 2020-11-02
Attending: INTERNAL MEDICINE
Payer: COMMERCIAL

## 2020-11-02 PROCEDURE — 93798 PHYS/QHP OP CAR RHAB W/ECG: CPT

## 2020-11-02 PROCEDURE — 999N000109 HC STATISTIC OP CR VISIT

## 2020-11-04 ENCOUNTER — HOSPITAL ENCOUNTER (OUTPATIENT)
Dept: CARDIAC REHAB | Facility: OTHER | Age: 45
End: 2020-11-04
Attending: INTERNAL MEDICINE
Payer: COMMERCIAL

## 2020-11-04 PROCEDURE — 999N000109 HC STATISTIC OP CR VISIT

## 2020-11-04 PROCEDURE — 93798 PHYS/QHP OP CAR RHAB W/ECG: CPT

## 2020-11-06 ENCOUNTER — HOSPITAL ENCOUNTER (OUTPATIENT)
Dept: CARDIAC REHAB | Facility: OTHER | Age: 45
End: 2020-11-06
Attending: INTERNAL MEDICINE
Payer: COMMERCIAL

## 2020-11-06 PROCEDURE — 93798 PHYS/QHP OP CAR RHAB W/ECG: CPT

## 2020-11-06 PROCEDURE — 999N000109 HC STATISTIC OP CR VISIT

## 2020-11-09 ENCOUNTER — HOSPITAL ENCOUNTER (OUTPATIENT)
Dept: CARDIAC REHAB | Facility: OTHER | Age: 45
End: 2020-11-09
Attending: INTERNAL MEDICINE
Payer: COMMERCIAL

## 2020-11-09 PROCEDURE — 93798 PHYS/QHP OP CAR RHAB W/ECG: CPT

## 2020-11-09 PROCEDURE — 999N000109 HC STATISTIC OP CR VISIT

## 2020-11-11 ENCOUNTER — HOSPITAL ENCOUNTER (OUTPATIENT)
Dept: CARDIAC REHAB | Facility: OTHER | Age: 45
End: 2020-11-11
Attending: INTERNAL MEDICINE
Payer: COMMERCIAL

## 2020-11-11 PROCEDURE — 93798 PHYS/QHP OP CAR RHAB W/ECG: CPT

## 2020-11-11 PROCEDURE — 999N000109 HC STATISTIC OP CR VISIT

## 2020-11-13 ENCOUNTER — HOSPITAL ENCOUNTER (OUTPATIENT)
Dept: CARDIAC REHAB | Facility: OTHER | Age: 45
End: 2020-11-13
Attending: INTERNAL MEDICINE
Payer: COMMERCIAL

## 2020-11-13 PROCEDURE — 999N000109 HC STATISTIC OP CR VISIT

## 2020-11-13 PROCEDURE — 93798 PHYS/QHP OP CAR RHAB W/ECG: CPT

## 2020-11-13 ASSESSMENT — PATIENT HEALTH QUESTIONNAIRE - PHQ9: SUM OF ALL RESPONSES TO PHQ QUESTIONS 1-9: 3

## 2020-11-20 DIAGNOSIS — E11.9 TYPE 2 DIABETES MELLITUS WITHOUT COMPLICATION, WITHOUT LONG-TERM CURRENT USE OF INSULIN (H): ICD-10-CM

## 2020-11-20 LAB
HBA1C MFR BLD: 6.4 % (ref 4–6)
TESTOST SERPL-MCNC: 170 NG/DL (ref 175–781)

## 2020-11-20 PROCEDURE — 84403 ASSAY OF TOTAL TESTOSTERONE: CPT | Mod: ZL | Performed by: FAMILY MEDICINE

## 2020-11-20 PROCEDURE — 36415 COLL VENOUS BLD VENIPUNCTURE: CPT | Mod: ZL | Performed by: FAMILY MEDICINE

## 2020-11-20 PROCEDURE — 83036 HEMOGLOBIN GLYCOSYLATED A1C: CPT | Mod: ZL | Performed by: FAMILY MEDICINE

## 2020-12-05 DIAGNOSIS — M25.532 PAIN OF BOTH WRIST JOINTS: Primary | ICD-10-CM

## 2020-12-05 DIAGNOSIS — M25.531 PAIN OF BOTH WRIST JOINTS: Primary | ICD-10-CM

## 2020-12-05 DIAGNOSIS — E11.9 TYPE 2 DIABETES MELLITUS WITHOUT COMPLICATION, WITHOUT LONG-TERM CURRENT USE OF INSULIN (H): ICD-10-CM

## 2020-12-07 DIAGNOSIS — E11.9 TYPE 2 DIABETES MELLITUS WITHOUT COMPLICATION, WITHOUT LONG-TERM CURRENT USE OF INSULIN (H): Primary | ICD-10-CM

## 2020-12-07 RX ORDER — GLIPIZIDE 10 MG/1
TABLET, FILM COATED, EXTENDED RELEASE ORAL
Qty: 90 TABLET | Refills: 3 | Status: SHIPPED | OUTPATIENT
Start: 2020-12-07 | End: 2022-01-18

## 2020-12-07 RX ORDER — MELOXICAM 15 MG/1
TABLET ORAL
Qty: 90 TABLET | Refills: 3 | Status: SHIPPED | OUTPATIENT
Start: 2020-12-07 | End: 2022-01-18

## 2020-12-07 NOTE — TELEPHONE ENCOUNTER
[id number] sent Rx request for the following:      Requested Prescriptions   Pending Prescriptions Disp Refills   omeprazole (PRILOSEC) 40 MG DR capsule 90 capsule 3    Sig: Take 1 capsule (40 mg) by mouth daily with food   Last Prescription Date:   11/26/19  Last Fill Qty/Refills:         90, R-3    Last Office Visit:              10/23/20  Future Office visit:           None  Routing refill request to provider for review/approval because:   PPI Protocol Failed - 12/7/2020  4:25 PM       Failed - Not on Clopidogrel (unless Pantoprazole ordered)     Unable to complete prescription refill per RN Medication Refill Policy. Stefanie Mae RN .............. 12/7/2020  4:27 PM

## 2020-12-07 NOTE — TELEPHONE ENCOUNTER
[id number] sent Rx request for the following:      Requested Prescriptions   Pending Prescriptions Disp Refills   meloxicam (MOBIC) 15 MG tablet [Pharmacy Med Name: MELOXICAM 15MG TABLET] 30 tablet     Sig: TAKE 1 TAB BY MOUTH ONCE DAILY   Last Prescription Date:   11/26/19  Last Fill Qty/Refills:         90, R-3    Routing refill request to provider for review/approval because:   NSAID Medications Failed - 12/7/2020 11:26 AM       Failed - Normal ALT on file in past 12 months       Failed - Normal AST on file in past 12 months      glipiZIDE (GLUCOTROL XL) 10 MG 24 hr tablet [Pharmacy Med Name: GLIPIZIDE 10MG ER TABLET] 30 tablet     Sig: TAKE 1 TAB BY MOUTH ONCE DAILY   Last Prescription Date:   11/26/19  Last Fill Qty/Refills:         90, R-3      Last Office Visit:              10/23/20  Future Office visit:           None    Unable to complete prescription refill per RN Medication Refill Policy. Stefanie Mae RN .............. 12/7/2020  11:33 AM

## 2020-12-08 RX ORDER — OMEPRAZOLE 40 MG/1
40 CAPSULE, DELAYED RELEASE ORAL
Qty: 90 CAPSULE | Refills: 3 | Status: SHIPPED | OUTPATIENT
Start: 2020-12-08 | End: 2022-01-18

## 2020-12-20 DIAGNOSIS — I10 ESSENTIAL HYPERTENSION: ICD-10-CM

## 2020-12-21 RX ORDER — LISINOPRIL 40 MG/1
TABLET ORAL
Qty: 90 TABLET | Refills: 1 | Status: SHIPPED | OUTPATIENT
Start: 2020-12-21 | End: 2021-08-06

## 2020-12-21 NOTE — TELEPHONE ENCOUNTER
[id number] sent Rx request for the following:   lisinopril (ZESTRIL) 40 MG tablet  Sig TAKE 1 TAB BY MOUTH ONCE DAILY    Last Prescription Date:   11/26/2019  Last Fill Qty/Refills:         90, R-3    Last Office Visit:              10/23/2020 (Nessa)   Future Office visit:           None noted   ACE Inhibitors (Including Combos) Protocol Passed - 12/20/2020 11:38 AM     Prescription approved per [facility].  Irma Marin RN ....................  12/21/2020   10:59 AM

## 2021-01-12 ENCOUNTER — TELEPHONE (OUTPATIENT)
Dept: FAMILY MEDICINE | Facility: OTHER | Age: 46
End: 2021-01-12

## 2021-01-12 PROBLEM — R19.5 DARK STOOLS: Status: RESOLVED | Noted: 2020-09-24 | Resolved: 2021-01-12

## 2021-01-12 PROBLEM — R07.89 OTHER CHEST PAIN: Status: RESOLVED | Noted: 2020-09-24 | Resolved: 2021-01-12

## 2021-01-12 NOTE — TELEPHONE ENCOUNTER
Patient is going to be having some dental extractions within the next 6 - 8 weeks they wondering if blood thinner needs to be adjusted or stopped prior    Please call to advise    Thank you

## 2021-01-12 NOTE — LETTER
My Asthma Action Plan    Name: Charles Leopold Matter   YOB: 1975  Date: 1/12/2021   My doctor: Sg Szymanski   My clinic: [facility]        My Rescue Medicine:   Albuterol inhaler (Proair/Ventolin/Proventil HFA)  2-4 puffs EVERY 4 HOURS as needed. Use a spacer if recommended by your provider.   My Asthma Severity:   Intermittent / Exercise Induced  Know your asthma triggers:              GREEN ZONE   Good Control    I feel good    No cough or wheeze    Can work, sleep and play without asthma symptoms       Take your asthma control medicine every day.     1. If exercise triggers your asthma, take your rescue medication    15 minutes before exercise or sports, and    During exercise if you have asthma symptoms  2. Spacer to use with inhaler: If you have a spacer, make sure to use it with your inhaler             YELLOW ZONE Getting Worse  I have ANY of these:    I do not feel good    Cough or wheeze    Chest feels tight    Wake up at night   1. Keep taking your Green Zone medications  2. Start taking your rescue medicine:    every 20 minutes for up to 1 hour. Then every 4 hours for 24-48 hours.  3. If you stay in the Yellow Zone for more than 12-24 hours, contact your doctor.  4. If you do not return to the Green Zone in 12-24 hours or you get worse, start taking your oral steroid medicine if prescribed by your provider.           RED ZONE Medical Alert - Get Help  I have ANY of these:    I feel awful    Medicine is not helping    Breathing getting harder    Trouble walking or talking    Nose opens wide to breathe       1. Take your rescue medicine NOW  2. If your provider has prescribed an oral steroid medicine, start taking it NOW  3. Call your doctor NOW  4. If you are still in the Red Zone after 20 minutes and you have not reached your doctor:    Take your rescue medicine again and    Call 911 or go to the emergency room right away    See your regular doctor within 2 weeks of  an Emergency Room or Urgent Care visit for follow-up treatment.          Annual Reminders:  Meet with Asthma Educator,  Flu Shot in the Fall, consider Pneumonia Vaccination for patients with asthma (aged 19 and older).    Pharmacy:    [facility], [place] - [address] [id number] - [place], [place] - [address]    Electronically signed by Sg Szymanski   Date: 01/12/21                    Asthma Triggers  How To Control Things That Make Your Asthma Worse    Triggers are things that make your asthma worse.  Look at the list below to help you find your triggers and   what you can do about them. You can help prevent asthma flare-ups by staying away from your triggers.      Trigger                                                          What you can do   Cigarette Smoke  Tobacco smoke can make asthma worse. Do not allow smoking in your home, car or around you.  Be sure no one smokes at a child s day care or school.  If you smoke, ask your health care provider for ways to help you quit.  Ask family members to quit too.  Ask your health care provider for a referral to Quit Plan to help you quit smoking, or call 6-913-650-PLAN.     Colds, Flu, Bronchitis  These are common triggers of asthma. Wash your hands often.  Don t touch your eyes, nose or mouth.  Get a flu shot every year.     Dust Mites  These are tiny bugs that live in cloth or carpet. They are too small to see. Wash sheets and blankets in hot water every week.   Encase pillows and mattress in dust mite proof covers.  Avoid having carpet if you can. If you have carpet, vacuum weekly.   Use a dust mask and HEPA vacuum.   Pollen and Outdoor Mold  Some people are allergic to trees, grass, or weed pollen, or molds. Try to keep your windows closed.  Limit time out doors when pollen count is high.   Ask you health care provider about taking medicine during allergy season.     Animal Dander  Some people are  allergic to skin flakes, urine or saliva from pets with fur or feathers. Keep pets with fur or feathers out of your home.    If you can t keep the pet outdoors, then keep the pet out of your bedroom.  Keep the bedroom door closed.  Keep pets off cloth furniture and away from stuffed toys.     Mice, Rats, and Cockroaches  Some people are allergic to the waste from these pests.   Cover food and garbage.  Clean up spills and food crumbs.  Store grease in the refrigerator.   Keep food out of the bedroom.   Indoor Mold  This can be a trigger if your home has high moisture. Fix leaking faucets, pipes, or other sources of water.   Clean moldy surfaces.  Dehumidify basement if it is damp and smelly.   Smoke, Strong Odors, and Sprays  These can reduce air quality. Stay away from strong odors and sprays, such as perfume, powder, hair spray, paints, smoke incense, paint, cleaning products, candles and new carpet.   Exercise or Sports  Some people with asthma have this trigger. Be active!  Ask your doctor about taking medicine before sports or exercise to prevent symptoms.    Warm up for 5-10 minutes before and after sports or exercise.     Other Triggers of Asthma  Cold air:  Cover your nose and mouth with a scarf.  Sometimes laughing or crying can be a trigger.  Some medicines and food can trigger asthma.

## 2021-01-12 NOTE — TELEPHONE ENCOUNTER
[facility] was notified and I left a message for patient to call the clinic back.    Janeth Garcia LPN on 1/12/2021 at 10:44 AM

## 2021-01-13 NOTE — TELEPHONE ENCOUNTER
Patient was notified of Dr. Szymanski's recommendation. I also updated patients ACT with him.    Janeth Garcia LPN on 1/13/2021 at 2:41 PM

## 2021-01-14 ASSESSMENT — ASTHMA QUESTIONNAIRES: ACT_TOTALSCORE: 23

## 2021-08-06 DIAGNOSIS — I10 ESSENTIAL HYPERTENSION: ICD-10-CM

## 2021-08-06 RX ORDER — LISINOPRIL 40 MG/1
TABLET ORAL
Qty: 90 TABLET | Refills: 0 | Status: SHIPPED | OUTPATIENT
Start: 2021-08-06 | End: 2021-09-28

## 2021-08-06 NOTE — TELEPHONE ENCOUNTER
"Requested Prescriptions   Pending Prescriptions Disp Refills     lisinopril (ZESTRIL) 40 MG tablet [Pharmacy Med Name: LISINOPRIL 40MG TABLET] 90 tablet 1     Sig: TAKE 1 TAB BY MOUTH ONCE DAILY       ACE Inhibitors (Including Combos) Protocol Passed - 8/6/2021  5:01 AM        Passed - Blood pressure under 140/90 in past 12 months     BP Readings from Last 3 Encounters:   10/23/20 138/88   10/22/20 138/86   10/21/20 (!) 142/90                 Passed - Recent (12 mo) or future (30 days) visit within the authorizing provider's specialty     Patient has had an office visit with the authorizing provider or a provider within the authorizing providers department within the previous 12 mos or has a future within next 30 days. See \"Patient Info\" tab in inbasket, or \"Choose Columns\" in Meds & Orders section of the refill encounter.              Passed - Medication is active on med list        Passed - Patient is age 18 or older        Passed - Normal serum creatinine on file in past 12 months     Recent Labs   Lab Test 09/24/20  1500   CR 0.92       Ok to refill medication if creatinine is low          Passed - Normal serum potassium on file in past 12 months     Recent Labs   Lab Test 09/24/20  1500   POTASSIUM 3.7              LOV: 10/23/2020. Prescription approved per [facility] Refill Protocol.  Irma Ruiz RN ....................  8/6/2021   11:25 AM      "

## 2021-08-11 DIAGNOSIS — I25.118 CORONARY ARTERY DISEASE OF NATIVE ARTERY OF NATIVE HEART WITH STABLE ANGINA PECTORIS (H): Primary | ICD-10-CM

## 2021-08-12 RX ORDER — CARVEDILOL 6.25 MG/1
TABLET ORAL
Qty: 180 TABLET | Refills: 3 | Status: SHIPPED | OUTPATIENT
Start: 2021-08-12 | End: 2021-09-21

## 2021-08-12 NOTE — TELEPHONE ENCOUNTER
"[id number] sent Rx request for the following:   carvedilol (COREG) 6.25 MG tablet  Sig TAKE 1 TABLET BY MOUTH TWICE A DAY WITH MEALS    Last Prescription Date:   09/21/2020 (Historical)  Last Fill Qty/Refills:         0, R-0    Last Office Visit:              10/23/2020 ([place])   Future Office visit:           09/21/2021 (Cardiology)   Beta-Blockers Protocol Passed - 8/11/2021  4:55 PM        Passed - Blood pressure under 140/90 in past 12 months     BP Readings from Last 3 Encounters:   10/23/20 138/88   10/22/20 138/86   10/21/20 (!) 142/90                 Passed - Patient is age 6 or older        Passed - Recent (12 mo) or future (30 days) visit within the authorizing provider's specialty     Patient has had an office visit with the authorizing provider or a provider within the authorizing providers department within the previous 12 mos or has a future within next 30 days. See \"Patient Info\" tab in inbasket, or \"Choose Columns\" in Meds & Orders section of the refill encounter.              Passed - Medication is active on med list           Patient has upcoming appointment with cardiology. Routing due to historical notation. Unable to complete prescription refill per RN Medication Refill Policy.................... Irma Ruiz RN ....................  8/12/2021   8:18 AM        "

## 2021-09-07 DIAGNOSIS — Z98.890 S/P CARDIAC CATH: ICD-10-CM

## 2021-09-07 DIAGNOSIS — I20.89 STABLE ANGINA (H): ICD-10-CM

## 2021-09-07 DIAGNOSIS — Z95.5 HISTORY OF CORONARY ARTERY STENT PLACEMENT: ICD-10-CM

## 2021-09-07 DIAGNOSIS — I10 ESSENTIAL HYPERTENSION: ICD-10-CM

## 2021-09-07 DIAGNOSIS — I25.118 CORONARY ARTERY DISEASE OF NATIVE ARTERY OF NATIVE HEART WITH STABLE ANGINA PECTORIS (H): ICD-10-CM

## 2021-09-07 DIAGNOSIS — I25.10 CORONARY ARTERY DISEASE INVOLVING NATIVE CORONARY ARTERY OF NATIVE HEART WITHOUT ANGINA PECTORIS: Primary | ICD-10-CM

## 2021-09-07 DIAGNOSIS — R94.39 ABNORMAL STRESS TEST: ICD-10-CM

## 2021-09-10 NOTE — TELEPHONE ENCOUNTER
"[id number] sent Rx request for the following:      Requested Prescriptions   Pending Prescriptions Disp Refills     aspirin (ASA) 81 MG chewable tablet [Pharmacy Med Name: ASPIRIN 81MG CHEWABLE TABLET] 90 tablet 2     Sig: TAKE 1 TABLET (81 MG) BY MOUTH DAILY       Analgesics (Non-Narcotic Tylenol and ASA Only) Passed - 9/7/2021  7:04 PM              Passed - Medication is active on med list   Last Prescription Date:   8/27/2020  Last Fill Qty/Refills:         90, R-3          clopidogrel (PLAVIX) 75 MG tablet [Pharmacy Med Name: CLOPIDOGREL 75MG TABLET] 90 tablet 2     Sig: TAKE 1 TABLET (75 MG) BY MOUTH DAILY       Plavix Failed - 9/7/2021  7:04 PM        Failed - No active PPI on record unless is Protonix        Passed - Normal HGB on file in past 12 months     Recent Labs   Lab Test 09/24/20  1500   HGB 13.7           Passed - Normal Platelets on file in past 12 months     Recent Labs   Lab Test 09/24/20  1500              Passed - Recent (12 mo) or future (30 days) visit within the authorizing provider's specialty     Patient has had an office visit with the authorizing provider or a provider within the authorizing providers department within the previous 12 mos or has a future within next 30 days. See \"Patient Info\" tab in inbasket, or \"Choose Columns\" in Meds & Orders section of the refill encounter.          Passed - Medication is active on med list        Passed - Patient is age 18 or older   Last Prescription Date:   9/24/2020  Last Fill Qty/Refills:         90, R-3          hydrochlorothiazide (HYDRODIURIL) 25 MG tablet [Pharmacy Med Name: HYDROCHLOROTHIAZIDE 25MG TAB] 90 tablet 2     Sig: TAKE 1 TABLET (25 MG) BY MOUTH DAILY       Diuretics (Including Combos) Protocol Passed - 9/7/2021  7:04 PM      Last Prescription Date:   8/27/2020  Last Fill Qty/Refills:         90, R-3          Last Office Visit:              10/22/2020  Angel  Future Office visit:           " 9/21/2021     Angel Cruz refill request to provider for review/approval Janeth Myrick RN ....................  9/10/2021   3:51 PM

## 2021-09-11 RX ORDER — HYDROCHLOROTHIAZIDE 25 MG/1
25 TABLET ORAL DAILY
Qty: 90 TABLET | Refills: 3 | Status: SHIPPED | OUTPATIENT
Start: 2021-09-11 | End: 2022-09-12

## 2021-09-11 RX ORDER — ASPIRIN 81 MG/1
81 TABLET, CHEWABLE ORAL DAILY
Qty: 90 TABLET | Refills: 3 | Status: SHIPPED | OUTPATIENT
Start: 2021-09-11 | End: 2022-09-22

## 2021-09-11 RX ORDER — CLOPIDOGREL BISULFATE 75 MG/1
75 TABLET ORAL DAILY
Qty: 90 TABLET | Refills: 3 | Status: SHIPPED | OUTPATIENT
Start: 2021-09-11 | End: 2021-09-28

## 2021-09-21 ENCOUNTER — OFFICE VISIT (OUTPATIENT)
Dept: CARDIOLOGY | Facility: OTHER | Age: 46
End: 2021-09-21
Attending: INTERNAL MEDICINE
Payer: COMMERCIAL

## 2021-09-21 VITALS
TEMPERATURE: 96 F | SYSTOLIC BLOOD PRESSURE: 122 MMHG | BODY MASS INDEX: 33.88 KG/M2 | HEIGHT: 71 IN | HEART RATE: 73 BPM | RESPIRATION RATE: 18 BRPM | WEIGHT: 242 LBS | DIASTOLIC BLOOD PRESSURE: 82 MMHG | OXYGEN SATURATION: 100 %

## 2021-09-21 DIAGNOSIS — Z98.890 S/P CARDIAC CATH: ICD-10-CM

## 2021-09-21 DIAGNOSIS — I20.89 STABLE ANGINA (H): Primary | ICD-10-CM

## 2021-09-21 DIAGNOSIS — I25.10 CORONARY ARTERY DISEASE INVOLVING NATIVE CORONARY ARTERY OF NATIVE HEART WITHOUT ANGINA PECTORIS: ICD-10-CM

## 2021-09-21 DIAGNOSIS — R94.39 ABNORMAL STRESS TEST: ICD-10-CM

## 2021-09-21 DIAGNOSIS — I25.118 CORONARY ARTERY DISEASE OF NATIVE ARTERY OF NATIVE HEART WITH STABLE ANGINA PECTORIS (H): ICD-10-CM

## 2021-09-21 DIAGNOSIS — E66.01 MORBID OBESITY (H): ICD-10-CM

## 2021-09-21 DIAGNOSIS — E78.2 MIXED HYPERLIPIDEMIA: ICD-10-CM

## 2021-09-21 DIAGNOSIS — R53.83 FATIGUE, UNSPECIFIED TYPE: ICD-10-CM

## 2021-09-21 DIAGNOSIS — I10 ESSENTIAL HYPERTENSION: ICD-10-CM

## 2021-09-21 DIAGNOSIS — J45.909 MILD ASTHMA WITHOUT COMPLICATION, UNSPECIFIED WHETHER PERSISTENT: ICD-10-CM

## 2021-09-21 DIAGNOSIS — G47.33 OSA ON CPAP: ICD-10-CM

## 2021-09-21 DIAGNOSIS — G47.00 INSOMNIA, UNSPECIFIED TYPE: ICD-10-CM

## 2021-09-21 DIAGNOSIS — Z95.5 HISTORY OF CORONARY ARTERY STENT PLACEMENT: ICD-10-CM

## 2021-09-21 DIAGNOSIS — Z87.891 HISTORY OF TOBACCO ABUSE: ICD-10-CM

## 2021-09-21 LAB
ATRIAL RATE - MUSE: 63 BPM
DIASTOLIC BLOOD PRESSURE - MUSE: NORMAL MMHG
INTERPRETATION ECG - MUSE: NORMAL
P AXIS - MUSE: 60 DEGREES
PR INTERVAL - MUSE: 198 MS
QRS DURATION - MUSE: 106 MS
QT - MUSE: 400 MS
QTC - MUSE: 409 MS
R AXIS - MUSE: 12 DEGREES
SYSTOLIC BLOOD PRESSURE - MUSE: NORMAL MMHG
T AXIS - MUSE: 42 DEGREES
VENTRICULAR RATE- MUSE: 63 BPM

## 2021-09-21 PROCEDURE — 93010 ELECTROCARDIOGRAM REPORT: CPT | Performed by: INTERNAL MEDICINE

## 2021-09-21 PROCEDURE — 99214 OFFICE O/P EST MOD 30 MIN: CPT | Performed by: INTERNAL MEDICINE

## 2021-09-21 PROCEDURE — G0463 HOSPITAL OUTPT CLINIC VISIT: HCPCS

## 2021-09-21 PROCEDURE — 93005 ELECTROCARDIOGRAM TRACING: CPT | Performed by: INTERNAL MEDICINE

## 2021-09-21 PROCEDURE — G0463 HOSPITAL OUTPT CLINIC VISIT: HCPCS | Mod: 25

## 2021-09-21 RX ORDER — NITROGLYCERIN 0.4 MG/1
TABLET SUBLINGUAL
Qty: 25 TABLET | Refills: 3 | Status: SHIPPED | OUTPATIENT
Start: 2021-09-21 | End: 2022-09-22

## 2021-09-21 RX ORDER — ROSUVASTATIN CALCIUM 20 MG/1
20 TABLET, COATED ORAL DAILY
Qty: 90 TABLET | Refills: 3 | Status: SHIPPED | OUTPATIENT
Start: 2021-09-21 | End: 2021-11-23

## 2021-09-21 RX ORDER — CARVEDILOL 12.5 MG/1
12.5 TABLET ORAL 2 TIMES DAILY WITH MEALS
Qty: 180 TABLET | Refills: 3 | Status: ON HOLD | OUTPATIENT
Start: 2021-09-21 | End: 2021-10-29

## 2021-09-21 ASSESSMENT — MIFFLIN-ST. JEOR: SCORE: 1987.7

## 2021-09-21 ASSESSMENT — PAIN SCALES - GENERAL: PAINLEVEL: NO PAIN (0)

## 2021-09-21 NOTE — PATIENT INSTRUCTIONS
You were seen by  Sg Ortiz, DO     1. Your crestor and nitroglycerin has been refilled.     2. Increase your Coreg to the following:  carvedilol (COREG) 12.5 MG tablet        Sig - Route: Take 1 tablet (12.5 mg) by mouth 2 times daily (with meals)       3. A stress echocardiogram has been ordered. You will be called to schedule this. You will receive instructions for testing at that time. You will be contacted with results.    4. A referral has been placed for you to have a sleep study and consult with Dr. Sarabia. You will be contacted to schedule these appointments.       You will follow up with [facility] in 1 year if testing is normal, sooner if testing is abnormal.     Please call the cardiology office with problems, questions, or concerns at [phone].    If you experience chest pain, chest pressure, chest tightness, shortness of breath, fainting, lightheadedness, nausea, vomiting, or other concerning symptoms, please report to the Emergency Department or call 911. These symptoms may be emergent, and best treated in the Emergency Department.     Cardiology Nurses  SAMINA Oh, SAMINA Spain, AMINAN  Priscila, LPN  [facility] ([facility])  [phone]

## 2021-09-21 NOTE — PROGRESS NOTES
[facility] HEART CARE   CARDIOLOGY PROGRESS NOTE     Chief Complaint   Patient presents with     Follow Up     annual          Diagnosis:  1.  S/P cardiac cath at [facility] on 9/8/2020.  He had x2 stents placed to ostial LAD.  2.  Abnormal stress test on 8/24/2020.  3.  CAD.  4.  Morbid obesity  5.  Hyperlipidemia-uncontrolled.  6.  Hypertension-controlled.  7.  DM-2.  8.  SCOTT.  9.  GERD.  10.  Mild asthma.  11.  H/O tobacco abuse quitting in 2013-1/2 pack a day.  12.  SCOTT-noncompliant    Assessment/Plan:    1.  With fatigue, shortness of breath, exertional chest, bilateral arm, neck, and intrascapular pain with history of heart disease for the last month, will be set up for a dobutamine stress echo.  2.  We will refill sublingual nitro as needed for chest pain and Crestor 20 mg daily.  3.  Will refer to sleep medicine secondary to fatigue, insomnia, and inconsistent usage of his CPAP.  He is struggling with the mask.  4.  Related to hypertension, will increase Coreg from 6.25 mg twice a day to 12.5 mg twice a day.  5.  He is to follow-up with his primary related to multiple joint pain/issues.  6.  If testing abnormal will be seen in follow-up.  Otherwise he will be seen in a year.        Interval history:  Previously, Herb was referred to [facility] secondary to a grossly abnormal stress test on 8/24/2020.  He had wall motion abnormalities consistent with LAD territory ischemia.  EF went from 50-55% at rest to 45-50% with activity with dilation of the LV cavity.  This resulted in x2 stents to his LAD secondary to a 70% lesion.  The rest of the vessels were found to have mild disease.     More recently, he has been having substernal with left and right precordial chest tightness.  His discomfort radiates to his neck and between his shoulder blades.  His discomfort has been exertional.  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  His symptoms have been going on for the last month.   Describes it as sharp/squeezing to his chest lasting 5 to 10 minutes.  With that he has been diaphoretic, nauseous occasionally and has been short of breath but denies vomiting, but never feels fully rested.  He has been sleeping between 6 and 8 hours a at night.  He has been sleeping well but continues to be fatigued.  Symptoms are somewhat comparable to symptoms he had previously resulted in stenting.  I suggested a stress echo which he was agreeable to.  He does not think he would be able to walk on a treadmill but would be able do an exercise bike.  He also has been having right shoulder, elbow, wrist, and carpal tunnel discomfort.  He is needing Crestor and sublingual nitro refilled.  I suggested he follow-up with sleep medicine as he is struggling to wear his CPAP consistently.          HPI:    Mr. Figueroa is being seen by cardiology for stable angina.  He had a abnormal stress echo suggesting LAD stenosis on 8/24/2020.  There is also history of morbid obesity with a weight of 231 today, hyperlipidemia-uncontrolled, hypertension-uncontrolled, MY-[id number], SCOTT, GERD, mild asthma, history of tobacco abuse quitting smoking in approximately 2013 had a half a pack a day.     He reports for the last 6 to 12 months, he has been having substernal chest pressure with radiation to his neck, left shoulder, and down his left arm. Generally, this is brought on by emotional stress.  More recently, he has seen increased episodes with activity.  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  He works in a building of 7 people.  He states at times this can be labor-intensive.  With cutting this paper, he describes a tightness to his chest as outlined below.  Other times, his discomfort has a sharp component.  His chest discomfort is more often brought on by anxiety and stress.  He reports his daughter  was killed in a motor vehicle accident at age 12, 18 years ago.  This resulted the divorce of his first wife 10 years ago.  He is remarried but has ongoing anxiety.  He states he misses his daughter every day.  His risk factors include history of tobacco abuse quitting approximate 7 years ago which would be around 2013-1/2 pack a day, uncontrolled diabetes mellitus with an A1c of 8.3% on 8/18/2020.  Cholesterol has been severely uncontrolled with a total cholesterol of 262, , and triglycerides of 193 on 11/14/2017.  He has a family history of heart disease with an uncle having bypass today, 8/27/2020.  His mom has history of heart disease and his dad had heart disease with diabetes. With his chest discomfort, he admits to diaphoresis, nausea, shortness of breath, dizziness, but no vomiting.  His symptoms will last up to 5 minutes.  He has not been on aspirin or sublingual nitro.  His blood pressure today is severely uncontrolled at 196/98.      He went on to have a stress echo on 8/24/2020.  Test was felt to be high risk.  He had mid anterior, basal with mid anterior septal hypokinesis suggesting LAD territory.  Ejection fraction was 55 to 60% and went to 45 to 50% with mild dilatation of the LV cavity.     Based on his symptoms, history, and abnormal stress test, it was suggested he have a cardiac catheterization sooner than later.  [facility] is booked [facility] a month.  He will be scheduled for [facility] in a couple of weeks.      Relevant testing:  Stress echo on 8/24/2020:  Abnormal, high-risk exercise stress echocardiogram at sub-maximal exercise capacity.       The target heart rate was not achieved (80% of predicted). At peak exercise,  there is hypokinesis of the mid anterior and basal and mid anteroseptum,  segments. This is suggestive of LAD territory ischemia.   Normal LV size and function at baseline. The LVEF is 55-60% at rest and declined to 45-50% after exercise with mild dilation of the LV  cavity.   Heart rate and blood pressure response to exercise were normal.   Normal functional capacity. The patient had chest pain during peak exercise.   The test was terminated due to fatigue.   No significant valvular or aortic abnormalities noted on screening tomograms.       Past Medical History:   Diagnosis Date     Encounter for screening for cardiovascular disorders     02-11, Negative stress test     Essential (primary) hypertension     No Comments Provided     Gastro-esophageal reflux disease without esophagitis     No Comments Provided     Major depressive disorder, single episode     After the death of his daughter in MVA     Mild intermittent asthma, uncomplicated     No Comments Provided     Nicotine dependence, uncomplicated     age [age], 1-2 packs per day, Quit Aug 2005     Suicidal ideations     2013,Hospitalized in [place]       Past Surgical History:   Procedure Laterality Date     LAPAROSCOPIC CHOLECYSTECTOMY      11-05,Dr. Dunne     TYMPANOSTOMY, LOCAL/TOPICAL ANESTHESIA      No Comments Provided       No Known Allergies    Current Outpatient Medications   Medication Sig Dispense Refill     aspirin (ASA) 81 MG chewable tablet TAKE 1 TABLET (81 MG) BY MOUTH DAILY 90 tablet 3     blood glucose (NO BRAND SPECIFIED) test strip Use to test blood sugar 1 times daily. 100 each 11     blood glucose monitoring (ACCU-CHEK LILIANA PLUS) meter device kit NEEDS METER,STRIPS,LANCETS 1 kit 1     blood glucose monitoring (ACCU-CHEK MULTICLIX) lancets Use to test blood sugar 1 times daily. 102 each 11     carvedilol (COREG) 12.5 MG tablet Take 1 tablet (12.5 mg) by mouth 2 times daily (with meals) 180 tablet 3     citalopram (CELEXA) 20 MG tablet Take 1 tablet (20 mg) by mouth daily 90 tablet 3     clopidogrel (PLAVIX) 75 MG tablet TAKE 1 TABLET (75 MG) BY MOUTH DAILY 90 tablet 3     gabapentin (NEURONTIN) 300 MG capsule TAKE 2 CAPSULES BY MOUTH THREE TIMES DAILY 180 capsule 11     glipiZIDE (GLUCOTROL XL) 10 MG  24 hr tablet TAKE 1 TAB BY MOUTH ONCE DAILY 90 tablet 3     hydrochlorothiazide (HYDRODIURIL) 25 MG tablet TAKE 1 TABLET (25 MG) BY MOUTH DAILY 90 tablet 3     lisinopril (ZESTRIL) 40 MG tablet TAKE 1 TAB BY MOUTH ONCE DAILY 90 tablet 0     meloxicam (MOBIC) 15 MG tablet TAKE 1 TAB BY MOUTH ONCE DAILY 90 tablet 3     metFORMIN (GLUCOPHAGE) 1000 MG tablet TAKE 1 TABLET BY MOUTH TWICE A DAY WITH MEALS 180 tablet 3     nitroGLYcerin (NITROSTAT) 0.4 MG sublingual tablet For chest pain place 1 tablet under the tongue every 5 minutes for 3 doses. If symptoms persist 5 minutes after 1st dose call 911. 25 tablet 3     omeprazole (PRILOSEC) 40 MG DR capsule Take 1 capsule (40 mg) by mouth daily with food 90 capsule 3     rosuvastatin (CRESTOR) 20 MG tablet Take 1 tablet (20 mg) by mouth daily 90 tablet 3       Social History     Socioeconomic History     Marital status: Legally      Spouse name: Not on file     Number of children: Not on file     Years of education: Not on file     Highest education level: Not on file   Occupational History     Not on file   Tobacco Use     Smoking status: Former Smoker     Packs/day: 0.00     Years: 20.00     Pack years: 0.00     Types: Cigarettes     Quit date: 2013     Years since quittin.1     Smokeless tobacco: Never Used   Substance and Sexual Activity     Alcohol use: Yes     Comment: Alcoholic Drinks/day: occasionally-rare 4 tomes a year     Drug use: Not Currently     Sexual activity: Not Currently   Other Topics Concern     Parent/sibling w/ CABG, MI or angioplasty before 65F 55M? Not Asked   Social History Narrative    .  Two sons.      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      Works as a  at [employer].      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     Social Determinants of Health     Financial Resource Strain:      Difficulty of Paying Living Expenses:     Food Insecurity:      Worried About Running Out of Food in the Last Year:      Ran Out of Food in the Last Year:    Transportation Needs:      Lack of Transportation (Medical):      Lack of Transportation (Non-Medical):    Physical Activity:      Days of Exercise per Week:      Minutes of Exercise per Session:    Stress:      Feeling of Stress :    Social Connections:      Frequency of Communication with Friends and Family:      Frequency of Social Gatherings with Friends and Family:      Attends Alevism Services:      Active Member of Clubs or Organizations:      Attends Club or Organization Meetings:      Marital Status:    Intimate Partner Violence:      Fear of Current or Ex-Partner:      Emotionally Abused:      Physically Abused:      Sexually Abused:        LAB RESULTS:   Transferred Records on 09/09/2020   Component Date Value Ref Range Status     Potassium 09/09/2020 3.7  3.5 - 5.1 mmol/L Final     Glucose 09/09/2020 151* 60 - 99 mg/dL Final     Creatinine 09/09/2020 0.90  0.80 - 1.50 mg/dL Final     GFR Estimate 09/09/2020 >60  SEE SCAN ml/min/1.73m2 Final   Allied Health/Nurse Visit on 09/05/2020   Component Date Value Ref Range Status     COVID-19 Virus PCR to [facility] - So* 09/05/2020 Nasopharyngeal   Final     COVID-19 Virus PCR to [facility] - Re* 09/05/2020 Not Detected   Final   Orders Only on 09/02/2020   Component Date Value Ref Range Status     Sodium 09/02/2020 141  134 - 144 mmol/L Final     Potassium 09/02/2020 3.9  3.5 - 5.1 mmol/L Final     Chloride 09/02/2020 99  98 - 107 mmol/L Final     Carbon Dioxide 09/02/2020 33* 21 - 31 mmol/L Final     Anion Gap 09/02/2020 9  3 - 14 mmol/L Final     Glucose 09/02/2020 187* 70 - 105 mg/dL Final     Urea Nitrogen 09/02/2020 12  7 - 25 mg/dL Final     Creatinine 09/02/2020 1.04  0.70 - 1.30 mg/dL Final     GFR Estimate 09/02/2020 77  >60 mL/min/[1.73_m2] Final     GFR Estimate If Black 09/02/2020 >90  >60 mL/min/[1.73_m2] Final     Calcium 09/02/2020  "9.7  8.6 - 10.3 mg/dL Final     WBC 09/02/2020 4.5  4.0 - 11.0 10e9/L Final     RBC Count 09/02/2020 5.41  4.4 - 5.9 10e12/L Final     Hemoglobin 09/02/2020 15.6  13.3 - 17.7 g/dL Final     Hematocrit 09/02/2020 46.4  40.0 - 53.0 % Final     MCV 09/02/2020 86  78 - 100 fl Final     MCH 09/02/2020 28.8  26.5 - 33.0 pg Final     MCHC 09/02/2020 33.6  31.5 - 36.5 g/dL Final     RDW 09/02/2020 11.9  10.0 - 15.0 % Final     Platelet Count 09/02/2020 157  150 - 450 10e9/L Final   Office Visit on 08/27/2020   Component Date Value Ref Range Status     Interpretation ECG 08/27/2020 Click View Image link to view waveform and result   Final   Office Visit on 08/18/2020   Component Date Value Ref Range Status     Hemoglobin A1C 08/18/2020 8.3* 4.0 - 6.0 % Final        Review of systems: Negative except that which was noted in the HPI.    Physical examination:  /82 (BP Location: Right arm, Patient Position: Sitting, Cuff Size: Adult Regular)   Pulse 73   Temp (!) 96  F (35.6  C) (Tympanic)   Resp 18   Ht 1.8 m (5' 10.87\")   Wt 109.8 kg (242 lb)   SpO2 100%   BMI 33.88 kg/m      GENERAL APPEARANCE: healthy, alert and no distress  HEENT: no icterus, no xanthelasmas, normal pupil size and reaction, no cyanosis.  NECK: no adenopathy, no asymmetry, masses, or scars.  CHEST: lungs clear to auscultation - no rales, rhonchi or wheezes, no use of accessory muscles, no retractions, respirations are unlabored, normal respiratory rate  CARDIOVASCULAR: regular rhythm, normal S1 with physiologic split S2, no S3 or S4 and no murmur, click or rub  ABDOMEN: soft, non tender, bowel sounds normal  EXTREMITIES: no clubbing, cyanosis or edema  NEURO: alert and oriented normal speech, and affect  VASC: No vascular bruits heard.  SKIN: no ecchymoses, no rashes      Thank you for allowing me to participate in the care of your patient. Please do not hesitate to contact me if you have any questions.     Sg Ortiz, DO          "

## 2021-09-21 NOTE — NURSING NOTE
"Patient comes  for annual follow up.  Judit Jane LPN ....................9/21/2021   9:55 AM  Chief Complaint   Patient presents with     Follow Up     annual       Initial BP (!) 144/98 (BP Location: Right arm, Patient Position: Sitting, Cuff Size: Adult Large)   Pulse 73   Temp (!) 96  F (35.6  C) (Tympanic)   Resp 18   Ht 1.8 m (5' 10.87\")   Wt 109.8 kg (242 lb)   SpO2 100%   BMI 33.88 kg/m   Estimated body mass index is 33.88 kg/m  as calculated from the following:    Height as of this encounter: 1.8 m (5' 10.87\").    Weight as of this encounter: 109.8 kg (242 lb).  Meds Reconciled: complete  Pt is on Aspirin  Pt is on a Statin  PHQ and/or MARGY reviewed. Pt referred to PCP/MH Provider as appropriate.    Judit Jane LPN      "

## 2021-09-23 ENCOUNTER — TELEPHONE (OUTPATIENT)
Dept: CARDIOLOGY | Facility: OTHER | Age: 46
End: 2021-09-23

## 2021-09-23 NOTE — TELEPHONE ENCOUNTER
Left message with our direct number given. [phone] to call with any questions for appointment on 9/27/21.  Hold beta blocker (coreg)  day before and day of procedure.

## 2021-09-24 ENCOUNTER — TELEPHONE (OUTPATIENT)
Dept: CARDIOLOGY | Facility: OTHER | Age: 46
End: 2021-09-24

## 2021-09-24 NOTE — TELEPHONE ENCOUNTER
Left message as before hold coreg for his appointment on 9/27/21.  To bring with him to take after the test.

## 2021-09-27 ENCOUNTER — HOSPITAL ENCOUNTER (OUTPATIENT)
Dept: CARDIOLOGY | Facility: OTHER | Age: 46
Discharge: HOME | End: 2021-09-27
Attending: INTERNAL MEDICINE | Admitting: INTERNAL MEDICINE
Payer: COMMERCIAL

## 2021-09-27 DIAGNOSIS — Z98.890 S/P CARDIAC CATH: ICD-10-CM

## 2021-09-27 DIAGNOSIS — R94.39 ABNORMAL STRESS TEST: ICD-10-CM

## 2021-09-27 DIAGNOSIS — I25.118 CORONARY ARTERY DISEASE OF NATIVE ARTERY OF NATIVE HEART WITH STABLE ANGINA PECTORIS (H): ICD-10-CM

## 2021-09-27 DIAGNOSIS — I20.89 STABLE ANGINA (H): Primary | ICD-10-CM

## 2021-09-27 DIAGNOSIS — I25.10 CORONARY ARTERY DISEASE INVOLVING NATIVE CORONARY ARTERY OF NATIVE HEART WITHOUT ANGINA PECTORIS: ICD-10-CM

## 2021-09-27 DIAGNOSIS — Z95.5 HISTORY OF CORONARY ARTERY STENT PLACEMENT: ICD-10-CM

## 2021-09-27 DIAGNOSIS — I20.89 STABLE ANGINA (H): ICD-10-CM

## 2021-09-27 LAB — LVEF ECHO: NORMAL

## 2021-09-27 PROCEDURE — 255N000002 HC RX 255 OP 636: Performed by: INTERNAL MEDICINE

## 2021-09-27 PROCEDURE — 93308 TTE F-UP OR LMTD: CPT | Mod: 26 | Performed by: INTERNAL MEDICINE

## 2021-09-27 PROCEDURE — 93325 DOPPLER ECHO COLOR FLOW MAPG: CPT | Mod: 26 | Performed by: INTERNAL MEDICINE

## 2021-09-27 PROCEDURE — 93321 DOPPLER ECHO F-UP/LMTD STD: CPT | Mod: 26 | Performed by: INTERNAL MEDICINE

## 2021-09-27 PROCEDURE — C8924 2D TTE W OR W/O FOL W/CON,FU: HCPCS

## 2021-09-27 RX ORDER — SODIUM CHLORIDE 9 MG/ML
INJECTION, SOLUTION INTRAVENOUS CONTINUOUS
Status: CANCELLED | OUTPATIENT
Start: 2021-09-27

## 2021-09-27 RX ORDER — POTASSIUM CHLORIDE 1500 MG/1
40 TABLET, EXTENDED RELEASE ORAL
Status: CANCELLED | OUTPATIENT
Start: 2021-09-27

## 2021-09-27 RX ORDER — LIDOCAINE 40 MG/G
CREAM TOPICAL
Status: CANCELLED | OUTPATIENT
Start: 2021-09-27

## 2021-09-27 RX ORDER — POTASSIUM CHLORIDE 1500 MG/1
20 TABLET, EXTENDED RELEASE ORAL
Status: CANCELLED | OUTPATIENT
Start: 2021-09-27

## 2021-09-27 RX ADMIN — PERFLUTREN 2 ML: 6.52 INJECTION, SUSPENSION INTRAVENOUS at 14:29

## 2021-09-27 NOTE — PROGRESS NOTES
1400 The patient arrived for a stress echo.  The procedure, risks and benefits were discussed and the consent was signed.  The patient was prepped for the stress test, and an IV was placed per procedure.  The echo sonographer did the initial images with definity for image enhancement. Patient had wall motion abnormalities during the screening part of the echo.  [facility] contacted and test canceled by Dr. Parish Vera. Staff message sent to Dr. Ortiz. The patient was released in stable condition.  The patient was instructed that the ordering MD will call within a day or two to schedule follow up

## 2021-09-28 ENCOUNTER — OFFICE VISIT (OUTPATIENT)
Dept: FAMILY MEDICINE | Facility: OTHER | Age: 46
End: 2021-09-28
Attending: FAMILY MEDICINE
Payer: COMMERCIAL

## 2021-09-28 ENCOUNTER — HOSPITAL ENCOUNTER (OUTPATIENT)
Dept: GENERAL RADIOLOGY | Facility: OTHER | Age: 46
End: 2021-09-28
Attending: FAMILY MEDICINE
Payer: COMMERCIAL

## 2021-09-28 VITALS
HEIGHT: 71 IN | WEIGHT: 239.2 LBS | OXYGEN SATURATION: 98 % | SYSTOLIC BLOOD PRESSURE: 120 MMHG | HEART RATE: 72 BPM | DIASTOLIC BLOOD PRESSURE: 80 MMHG | TEMPERATURE: 98.5 F | RESPIRATION RATE: 20 BRPM | BODY MASS INDEX: 33.49 KG/M2

## 2021-09-28 DIAGNOSIS — I25.118 CORONARY ARTERY DISEASE OF NATIVE ARTERY OF NATIVE HEART WITH STABLE ANGINA PECTORIS (H): Primary | ICD-10-CM

## 2021-09-28 DIAGNOSIS — M54.2 CERVICAL SPINE PAIN: ICD-10-CM

## 2021-09-28 DIAGNOSIS — I10 ESSENTIAL HYPERTENSION: ICD-10-CM

## 2021-09-28 PROCEDURE — G0463 HOSPITAL OUTPT CLINIC VISIT: HCPCS

## 2021-09-28 PROCEDURE — 99214 OFFICE O/P EST MOD 30 MIN: CPT | Performed by: FAMILY MEDICINE

## 2021-09-28 PROCEDURE — 72040 X-RAY EXAM NECK SPINE 2-3 VW: CPT

## 2021-09-28 RX ORDER — LISINOPRIL 40 MG/1
TABLET ORAL
Qty: 90 TABLET | Refills: 3 | Status: SHIPPED | OUTPATIENT
Start: 2021-09-28 | End: 2022-09-22

## 2021-09-28 ASSESSMENT — PAIN SCALES - GENERAL: PAINLEVEL: EXTREME PAIN (8)

## 2021-09-28 ASSESSMENT — MIFFLIN-ST. JEOR: SCORE: 1975.06

## 2021-09-28 NOTE — PROGRESS NOTES
"SUBJECTIVE:  Herb Figueroa is a 46 year old male here for follow-up.  He was recently seen by cardiology for his annual follow-up.  He continues to have fatigue, shortness of breath exertional chest and arm pain.  He was scheduled for dobutamine stress echo yesterday.  This was read as being abnormal so he has been referred [facility] for angiogram.  He has a history of sleep apnea and continues to struggle using his mask regularly.    He had Coreg increased up to 12.5 mg twice daily.  Reports that he has not been on Plavix for quite some time due to insurance issues.  He is over a year from his last stent so I think that is reasonable to discontinue it.    He continues to have bilateral shoulder, elbow and neck pain.  He has known bilateral carpal tunnel syndrome with EMGs a year ago.  He responds fairly well to bilateral carpal tunnel injections.    Allergies:  No Known Allergies    ROS:    As above otherwise ROS is unremarkable.    OBJECTIVE:  /80   Pulse 72   Temp 98.5  F (36.9  C)   Resp 20   Ht 1.8 m (5' 10.87\")   Wt 108.5 kg (239 lb 3.2 oz)   SpO2 98%   BMI 33.48 kg/m      EXAM:  General Appearance: Pleasant, alert, appropriate appearance for age. No acute distress  Head: Normal. Normocephalic, atraumatic.  Eyes: PERRL, EOMI  Neck: Supple, no masses or nodes, no lymphadenopathy.  No thyromegaly.  Musculoskeletal: No edema.  No atrophy in his hands.  Skin: no concerning or new rashes.  Neurologic Exam: CN 2-12 grossly intact.  Normal gait.  No focal motor or sensory deficits in his upper or lower extremities.    Psychiatric Exam: Alert and oriented, appropriate affect.    ASSESSEMENT AND PLAN:    1. Coronary artery disease of native artery of native heart with stable angina pectoris (H)    2. Essential hypertension    3. Cervical spine pain      He will follow-up with cardiology as scheduled for his upcoming angiogram.    In regards to his hypertension his lisinopril was filled " again for a year.    He has history of neck pain, shoulder pain, elbow pain and known carpal tunnel syndrome.  X-ray of his cervical spine is completed today, personally viewed and shows loss of curvature as well as anterior degenerative changes.  Given the fact that he has x-ray changes of degeneration, upper extremity symptoms and no improvement with several months of chiropractic care, MRI will be ordered to see if there is any central causes of his upper extremity symptoms.  If this comes back relatively unremarkable he can consider bilateral carpal tunnel injections in the future or referral to orthopedics if he is considering surgery.    He will follow-up after his angiogram to see how he is doing at that time we can follow-up on his imaging results.    William Szymanski MD  Family Medicine

## 2021-09-28 NOTE — NURSING NOTE
Patient presents today for wrist and elbow pain. Patient has been seeing a chiropractor and they recommended he have an xray of his neck.    Medication Reconciliation Complete    Janeth Garcia LPN  9/28/2021 3:30 PM

## 2021-09-29 DIAGNOSIS — I20.89 STABLE ANGINA (H): Primary | ICD-10-CM

## 2021-09-29 DIAGNOSIS — I25.118 CORONARY ARTERY DISEASE OF NATIVE ARTERY OF NATIVE HEART WITH STABLE ANGINA PECTORIS (H): ICD-10-CM

## 2021-09-29 DIAGNOSIS — Z98.890 S/P CARDIAC CATH: ICD-10-CM

## 2021-09-29 DIAGNOSIS — R94.39 ABNORMAL STRESS TEST: ICD-10-CM

## 2021-09-29 DIAGNOSIS — Z95.5 HISTORY OF CORONARY ARTERY STENT PLACEMENT: ICD-10-CM

## 2021-09-29 DIAGNOSIS — Z11.59 ENCOUNTER FOR SCREENING FOR OTHER VIRAL DISEASES: ICD-10-CM

## 2021-09-29 DIAGNOSIS — I25.10 CORONARY ARTERY DISEASE INVOLVING NATIVE CORONARY ARTERY OF NATIVE HEART WITHOUT ANGINA PECTORIS: ICD-10-CM

## 2021-09-29 RX ORDER — CLOPIDOGREL BISULFATE 75 MG/1
75 TABLET ORAL DAILY
Qty: 90 TABLET | Refills: 3 | Status: SHIPPED | OUTPATIENT
Start: 2021-09-29 | End: 2022-09-22

## 2021-10-06 ENCOUNTER — HOSPITAL ENCOUNTER (OUTPATIENT)
Dept: MRI IMAGING | Facility: OTHER | Age: 46
Discharge: HOME | End: 2021-10-06
Attending: FAMILY MEDICINE | Admitting: FAMILY MEDICINE
Payer: COMMERCIAL

## 2021-10-06 DIAGNOSIS — M54.2 CERVICAL SPINE PAIN: ICD-10-CM

## 2021-10-06 PROCEDURE — 72141 MRI NECK SPINE W/O DYE: CPT

## 2021-10-07 ENCOUNTER — ALLIED HEALTH/NURSE VISIT (OUTPATIENT)
Dept: CARDIOLOGY | Facility: OTHER | Age: 46
End: 2021-10-07
Attending: INTERNAL MEDICINE
Payer: COMMERCIAL

## 2021-10-07 DIAGNOSIS — Z11.59 ENCOUNTER FOR SCREENING FOR OTHER VIRAL DISEASES: ICD-10-CM

## 2021-10-07 DIAGNOSIS — Z20.822 COVID-19 RULED OUT: Primary | ICD-10-CM

## 2021-10-07 PROCEDURE — U0005 INFEC AGEN DETEC AMPLI PROBE: HCPCS | Mod: ZL

## 2021-10-07 PROCEDURE — G0463 HOSPITAL OUTPT CLINIC VISIT: HCPCS

## 2021-10-07 PROCEDURE — C9803 HOPD COVID-19 SPEC COLLECT: HCPCS

## 2021-10-07 NOTE — PROGRESS NOTES
-Pt scheduled to have procedure at [facility] 10/11/2021.    -Pt will continue on Aspirin 81 mg daily, including day of procedure.     -Pt will HOLD Glucophage and glipizide day of procedure.     Packet given, instructions reviewed, questions answered. Pt verbalizes understanding. Pt is agreeable to plan. Nurse teaching given and the patient expresses understanding and acceptance of instructions. Janeth Myrick RN 10/7/2021 9:14 AM

## 2021-10-08 ENCOUNTER — TELEPHONE (OUTPATIENT)
Dept: CARDIOLOGY | Facility: CLINIC | Age: 46
End: 2021-10-08

## 2021-10-08 LAB — SARS-COV-2 RNA RESP QL NAA+PROBE: POSITIVE

## 2021-10-08 NOTE — TELEPHONE ENCOUNTER
Left voicemail for patient to complete Travel Screen for Cardiac Cath Lab appointment on 10/11 and inform patient of updated Visitor Policy.       covid in process

## 2021-10-08 NOTE — TELEPHONE ENCOUNTER
Coronavirus (COVID-19) Notification    Reason for call  Notify of POSITIVE  COVID-19 lab result, assess symptoms,  review [facility] recommendations    Lab Result   Lab test for 2019-nCoV rRt-PCR or SARS-COV-2 PCR  Oropharyngeal AND/OR nasopharyngeal swabs were POSITIVE for 2019-nCoV RNA [OR] SARS-COV-2 RNA (COVID-19) RNA     We have been unable to reach Patient by phone at this time to notify of their Positive COVID-19 result.  Left voicemail message requesting a call back to [phone] [facility] for results.        POSITIVE COVID-19 Letter sent.    Janae Sanders LPN

## 2021-10-09 ENCOUNTER — TELEPHONE (OUTPATIENT)
Dept: CARDIOLOGY | Facility: CLINIC | Age: 46
End: 2021-10-09

## 2021-10-09 NOTE — TELEPHONE ENCOUNTER
Received phone call from patient as he tested covid positive and is scheduled for elective coronary angiogram on Monday with Dr Velázquez. Per patient, he is not symptomatic from his COVID and was positive on routine testing prior to procedure. I informed him that since this is an elective catheterization and he does not have active CP, to call the clinic on Monday to have his angiogram rescheduled. I informed him that if his CP becomes unstable or worse with less exercise, to present to the ED for further evaluation and consideration for emergent cath.

## 2021-10-11 ENCOUNTER — TELEPHONE (OUTPATIENT)
Dept: CARDIOLOGY | Facility: CLINIC | Age: 46
End: 2021-10-11

## 2021-10-11 NOTE — TELEPHONE ENCOUNTER
Leydi from the [facility] stated patient may be scheduled starting the week of the 25th.  Patient chose 10/29/21 with an 11:00AM check in.  Leydi notified of this.  Em Haywood RN......October 11, 2021...2:35 PM

## 2021-10-11 NOTE — TELEPHONE ENCOUNTER
----- Message from Leydi Bell sent at 10/11/2021  8:10 AM CDT -----  Thanks. I will forward this to Em at the [facility]. Roberto Strickland, just an fyi. We can reschedule him in 10 days as long as he is asymptomatic. (no follow up test will be needed)    Leydi :-)  ----- Message -----  From: Ventura House MD  Sent: 10/9/2021   1:49 PM CDT  To: Yonatan Velázquez MD, Leydi Bell    Hi All,    This patient called me and told me that he tested positive for covid prior to his angiogram on Monday. He does not have CP at rest and is being referred for elective angiogram after positive stress. I informed him to call the clinic and reschedule the appointment and come to ED for evaluation if he has CP at rest or it becomes worse at lower workloads.     Leydi could you facilitate?       Thanks,    Ventura

## 2021-10-11 NOTE — TELEPHONE ENCOUNTER
Left message to call back to reschedule patient's angiogram.  Em Haywood RN......October 11, 2021...9:22 AM

## 2021-10-15 ENCOUNTER — HOSPITAL ENCOUNTER (OUTPATIENT)
Dept: GENERAL RADIOLOGY | Facility: OTHER | Age: 46
End: 2021-10-15
Attending: NURSE PRACTITIONER
Payer: COMMERCIAL

## 2021-10-15 ENCOUNTER — OFFICE VISIT (OUTPATIENT)
Dept: FAMILY MEDICINE | Facility: OTHER | Age: 46
End: 2021-10-15
Payer: COMMERCIAL

## 2021-10-15 VITALS
HEART RATE: 74 BPM | BODY MASS INDEX: 32.48 KG/M2 | OXYGEN SATURATION: 96 % | WEIGHT: 232 LBS | HEIGHT: 71 IN | DIASTOLIC BLOOD PRESSURE: 50 MMHG | SYSTOLIC BLOOD PRESSURE: 90 MMHG | RESPIRATION RATE: 18 BRPM | TEMPERATURE: 100 F

## 2021-10-15 DIAGNOSIS — U07.1 INFECTION DUE TO 2019 NOVEL CORONAVIRUS: ICD-10-CM

## 2021-10-15 DIAGNOSIS — R09.89 CHEST CONGESTION: Primary | ICD-10-CM

## 2021-10-15 DIAGNOSIS — J18.9 PNEUMONIA OF BOTH LOWER LOBES DUE TO INFECTIOUS ORGANISM: ICD-10-CM

## 2021-10-15 DIAGNOSIS — R50.9 FEVER, UNSPECIFIED FEVER CAUSE: ICD-10-CM

## 2021-10-15 PROCEDURE — G0463 HOSPITAL OUTPT CLINIC VISIT: HCPCS

## 2021-10-15 PROCEDURE — 99214 OFFICE O/P EST MOD 30 MIN: CPT | Performed by: NURSE PRACTITIONER

## 2021-10-15 PROCEDURE — 71046 X-RAY EXAM CHEST 2 VIEWS: CPT

## 2021-10-15 PROCEDURE — G0463 HOSPITAL OUTPT CLINIC VISIT: HCPCS | Mod: 25

## 2021-10-15 RX ORDER — AZITHROMYCIN 250 MG/1
TABLET, FILM COATED ORAL
Qty: 6 TABLET | Refills: 0 | Status: SHIPPED | OUTPATIENT
Start: 2021-10-15 | End: 2021-10-20

## 2021-10-15 ASSESSMENT — PAIN SCALES - GENERAL: PAINLEVEL: EXTREME PAIN (9)

## 2021-10-15 ASSESSMENT — MIFFLIN-ST. JEOR: SCORE: 1944.48

## 2021-10-15 NOTE — PATIENT INSTRUCTIONS
Treating for pneumonia with antibiotics due to co morbidities, despite cause of this subtle pneumonia likely being your known covid positive status.     Follow up in 2 weeks, for repeat chest xray if your PCP prefers as recommended by radiologist on read today.     Follow up EMERGENTLY if you develop worsening symptoms and not improvement. Today you have a fever but your oxygenation is at 96% which is reassuring.     Continue to quarantine at home. Encourage deep breathing exercises. May take tylenol for fevers.

## 2021-10-15 NOTE — PROGRESS NOTES
ASSESSMENT/PLAN:    I have reviewed the nursing notes.  I have reviewed the findings, diagnosis, plan and need for follow up with the patient.    1. Chest congestion  2. Fever, unspecified fever cause  3. Infection due to 2019 novel coronavirus  - XR Chest 2 Views  Suggestive of subtle multifocal pneumonia; likely caused by viral illness as he is known covid positive.     4. Pneumonia of both lower lobes due to infectious organism  Opted to treat with antibiotics despite likely viral pneumonia basd on patient's co morbidities of diabetes mellitus, asthma, hypertension, coronary artery disease, deep, and angina with abnormal stress test. Discussed with patient. Recommend follow up with family practice, either Dr. Szymanski or other provider if he is unavailable for follow up chest xray in 10 days or so. Follow up emergently if symptoms worsen or new concerns present. Today oxygen is 96% which is reassuring finding.   - amoxicillin-clavulanate (AUGMENTIN) 875-125 MG tablet; Take 1 tablet by mouth 2 times daily for 7 days  Dispense: 14 tablet; Refill: 0  - azithromycin (ZITHROMAX) 250 MG tablet; Take 2 tablets (500 mg) by mouth daily for 1 day, THEN 1 tablet (250 mg) daily for 4 days.  Dispense: 6 tablet; Refill: 0  -may use inhalers he has at home.   - Symptomatic treatment - Encouraged fluids, salt water gargles, honey (only if greater than 1 year in age due to risk of botulism), elevation, humidifier, sinus rinse/netti pot, lozenges, tea, topical vapor rub, popsicles, rest, etc   -May use over-the-counter Tylenol prn fevers, body aches     Discussed warning signs/symptoms indicative of need to f/u    Follow up if symptoms persist or worsen or concerns    I explained my diagnostic considerations and recommendations to the patient, who voiced understanding and agreement with the treatment plan. All questions were answered. We discussed potential side effects of any prescribed or recommended therapies, as well as  expectations for response to treatments.    Eva Bennett NP  10/15/2021  4:05 PM    HPI:  Herb Figueroa is a 46 year old male  who presents to [facility] today for concerns of worsening covid symptoms. Known covid positive diagnosed on 10/8/21. He is experiencing worsening fevers, weakness, nausea, fatigue, cough, shortness of breath, body aches. Feels generally unwell.  Difficulty with deep breathing. Shortness of breath at rest and with exertion. Has to sleep sitting upright.       Past Medical History:   Diagnosis Date     Encounter for screening for cardiovascular disorders     , Negative stress test     Essential (primary) hypertension     No Comments Provided     Gastro-esophageal reflux disease without esophagitis     No Comments Provided     Major depressive disorder, single episode     After the death of his daughter in MVA     Mild intermittent asthma, uncomplicated     No Comments Provided     Nicotine dependence, uncomplicated     age [age], 1-2 packs per day, Quit Aug 2005     Suicidal ideations     ,Hospitalized in [place]     Past Surgical History:   Procedure Laterality Date     LAPAROSCOPIC CHOLECYSTECTOMY      ,Dr. Dunne     TYMPANOSTOMY, LOCAL/TOPICAL ANESTHESIA      No Comments Provided     Social History     Tobacco Use     Smoking status: Former Smoker     Packs/day: 0.00     Years: 20.00     Pack years: 0.00     Types: Cigarettes     Quit date: 2013     Years since quittin.2     Smokeless tobacco: Never Used   Substance Use Topics     Alcohol use: Yes     Comment: Alcoholic Drinks/day: occasionally-rare 4 tomes a year     Current Outpatient Medications   Medication Sig Dispense Refill     amoxicillin-clavulanate (AUGMENTIN) 875-125 MG tablet Take 1 tablet by mouth 2 times daily for 7 days 14 tablet 0     aspirin (ASA) 81 MG chewable tablet TAKE 1 TABLET (81 MG) BY MOUTH DAILY 90 tablet 3     azithromycin (ZITHROMAX) 250 MG tablet Take 2 tablets (500 mg) by mouth  daily for 1 day, THEN 1 tablet (250 mg) daily for 4 days. 6 tablet 0     blood glucose (NO BRAND SPECIFIED) test strip Use to test blood sugar 1 times daily. 100 each 11     blood glucose monitoring (ACCU-CHEK LILIANA PLUS) meter device kit NEEDS METER,STRIPS,LANCETS 1 kit 1     blood glucose monitoring (ACCU-CHEK MULTICLIX) lancets Use to test blood sugar 1 times daily. 102 each 11     carvedilol (COREG) 12.5 MG tablet Take 1 tablet (12.5 mg) by mouth 2 times daily (with meals) 180 tablet 3     citalopram (CELEXA) 20 MG tablet Take 1 tablet (20 mg) by mouth daily 90 tablet 3     clopidogrel (PLAVIX) 75 MG tablet Take 1 tablet (75 mg) by mouth daily 90 tablet 3     gabapentin (NEURONTIN) 300 MG capsule TAKE 2 CAPSULES BY MOUTH THREE TIMES DAILY 180 capsule 11     glipiZIDE (GLUCOTROL XL) 10 MG 24 hr tablet TAKE 1 TAB BY MOUTH ONCE DAILY 90 tablet 3     hydrochlorothiazide (HYDRODIURIL) 25 MG tablet TAKE 1 TABLET (25 MG) BY MOUTH DAILY 90 tablet 3     lisinopril (ZESTRIL) 40 MG tablet TAKE 1 TAB BY MOUTH ONCE DAILY 90 tablet 3     meloxicam (MOBIC) 15 MG tablet TAKE 1 TAB BY MOUTH ONCE DAILY 90 tablet 3     metFORMIN (GLUCOPHAGE) 1000 MG tablet TAKE 1 TABLET BY MOUTH TWICE A DAY WITH MEALS 180 tablet 3     nitroGLYcerin (NITROSTAT) 0.4 MG sublingual tablet For chest pain place 1 tablet under the tongue every 5 minutes for 3 doses. If symptoms persist 5 minutes after 1st dose call 911. 25 tablet 3     omeprazole (PRILOSEC) 40 MG DR capsule Take 1 capsule (40 mg) by mouth daily with food 90 capsule 3     rosuvastatin (CRESTOR) 20 MG tablet Take 1 tablet (20 mg) by mouth daily 90 tablet 3     No Known Allergies  Past medical history, past surgical history, current medications and allergies reviewed and accurate to the best of my knowledge.      ROS:  Refer to HPI    BP 90/50 (BP Location: Right arm, Patient Position: Sitting, Cuff Size: Adult Large)   Pulse 74   Temp 100  F (37.8  C) (Tympanic)   Resp 18   Ht 1.803 m  "(5' 11\")   Wt 105.2 kg (232 lb)   SpO2 96%   BMI 32.36 kg/m      EXAM:  General Appearance: Well appearing 46 year old male, appropriate appearance for age. No acute distress.   Ears: Left TM intact, translucent with bony landmarks appreciated, no erythema, no effusion, no bulging, no purulence.  Right TM intact, translucent with bony landmarks appreciated, no erythema, no effusion, no bulging, no purulence.  Left auditory canal clear.  Right auditory canal clear.  Normal external ears, non tender.  Eyes: conjunctivae normal without erythema or irritation, corneas clear, no drainage or crusting, no eyelid swelling, pupils equal   Orophayrnx: moist mucous membranes, posterior pharynx without erythema, tonsils without hypertrophy, no erythema, no exudates or petechiae, no post nasal drip seen, no trismus, voice clear.    Sinuses:  No sinus tenderness upon palpation of the frontal or maxillary sinuses  Nose:  Bilateral nares: no erythema, no edema, no drainage or congestion   Neck: supple without adenopathy  Respiratory: normal chest wall and respirations.  Normal effort.  Crackles upon inspiration of bilateral lobes.  No increased work of breathing. Able to talk in full sentences.  + dry cough observed.   Cardiac: RRR with no murmurs  Abdomen: soft, nontender, no rigidity, no rebound tenderness or guarding, normal bowel sounds present  :  No suprapubic tenderness to palpation.  No CVA tenderness to palpation.    Musculoskeletal:  Equal movement of bilateral upper extremities.  Equal movement of bilateral lower extremities.  Normal gait.    Dermatological: no rashes noted of exposed skin  Psychological: normal affect, alert, oriented, and pleasant.     Results for orders placed or performed in visit on 10/15/21   XR Chest 2 Views     Status: None    Narrative    PROCEDURE:  XR CHEST 2 VW    HISTORY: pneumonia? is covid positive since 10/8/21 with worsening  symptoms, sob, cough, fevers; Chest congestion; Infection " due to 2019  novel coronavirus; Fever, unspecified fever cause, .    COMPARISON:  8/7/2020    FINDINGS:  The cardiomediastinal contours are normal. The trachea is midline.  Subtle multifocal pulmonary infiltrates are present. No effusion or  pneumothorax is seen.    No suspicious osseous lesion or subdiaphragmatic free air.      Impression    IMPRESSION:      Subtle multifocal pneumonia. Recommend follow-up.      MAKENNA MONTES MD         SYSTEM ID:  [id number]

## 2021-10-15 NOTE — NURSING NOTE
"Chief Complaint   Patient presents with     Nasal Congestion     Cough     Chest Pain     Fatigue     Patient presented to the clinic with covid, cough,nasal congestion, chest pain from the cough and fatigue. Patient reported that he is unable to stop coughing and it keeps him up at night.    Initial BP 90/50 (BP Location: Right arm, Patient Position: Sitting, Cuff Size: Adult Large)   Pulse 74   Temp 100  F (37.8  C) (Tympanic)   Resp 18   Ht 1.803 m (5' 11\")   Wt 105.2 kg (232 lb)   SpO2 96%   BMI 32.36 kg/m   Estimated body mass index is 32.36 kg/m  as calculated from the following:    Height as of this encounter: 1.803 m (5' 11\").    Weight as of this encounter: 105.2 kg (232 lb).     FOOD SECURITY SCREENING QUESTIONS  Hunger Vital Signs:  Within the past 12 months we worried whether our food would run out before we got money to buy more. Never  Within the past 12 months the food we bought just didn't last and we didn't have money to get more. Never      Medication Reconciliation: Complete      Barbie Leblanc, SHABBIR   "

## 2021-10-26 ENCOUNTER — OFFICE VISIT (OUTPATIENT)
Dept: FAMILY MEDICINE | Facility: OTHER | Age: 46
End: 2021-10-26
Attending: FAMILY MEDICINE
Payer: COMMERCIAL

## 2021-10-26 VITALS
RESPIRATION RATE: 20 BRPM | WEIGHT: 228.8 LBS | OXYGEN SATURATION: 96 % | BODY MASS INDEX: 32.03 KG/M2 | DIASTOLIC BLOOD PRESSURE: 86 MMHG | HEIGHT: 71 IN | HEART RATE: 72 BPM | SYSTOLIC BLOOD PRESSURE: 130 MMHG | TEMPERATURE: 98 F

## 2021-10-26 DIAGNOSIS — M48.02 CERVICAL STENOSIS OF SPINAL CANAL: ICD-10-CM

## 2021-10-26 DIAGNOSIS — M54.12 CERVICAL RADICULOPATHY: ICD-10-CM

## 2021-10-26 DIAGNOSIS — U07.1 INFECTION DUE TO 2019 NOVEL CORONAVIRUS: Primary | ICD-10-CM

## 2021-10-26 PROCEDURE — 99213 OFFICE O/P EST LOW 20 MIN: CPT | Performed by: FAMILY MEDICINE

## 2021-10-26 PROCEDURE — G0463 HOSPITAL OUTPT CLINIC VISIT: HCPCS

## 2021-10-26 ASSESSMENT — ANXIETY QUESTIONNAIRES
7. FEELING AFRAID AS IF SOMETHING AWFUL MIGHT HAPPEN: MORE THAN HALF THE DAYS
6. BECOMING EASILY ANNOYED OR IRRITABLE: NEARLY EVERY DAY
GAD7 TOTAL SCORE: 15
1. FEELING NERVOUS, ANXIOUS, OR ON EDGE: NEARLY EVERY DAY
4. TROUBLE RELAXING: MORE THAN HALF THE DAYS
7. FEELING AFRAID AS IF SOMETHING AWFUL MIGHT HAPPEN: MORE THAN HALF THE DAYS
GAD7 TOTAL SCORE: 15
2. NOT BEING ABLE TO STOP OR CONTROL WORRYING: MORE THAN HALF THE DAYS
8. IF YOU CHECKED OFF ANY PROBLEMS, HOW DIFFICULT HAVE THESE MADE IT FOR YOU TO DO YOUR WORK, TAKE CARE OF THINGS AT HOME, OR GET ALONG WITH OTHER PEOPLE?: VERY DIFFICULT
3. WORRYING TOO MUCH ABOUT DIFFERENT THINGS: MORE THAN HALF THE DAYS
GAD7 TOTAL SCORE: 15
5. BEING SO RESTLESS THAT IT IS HARD TO SIT STILL: SEVERAL DAYS

## 2021-10-26 ASSESSMENT — PATIENT HEALTH QUESTIONNAIRE - PHQ9
SUM OF ALL RESPONSES TO PHQ QUESTIONS 1-9: 16
10. IF YOU CHECKED OFF ANY PROBLEMS, HOW DIFFICULT HAVE THESE PROBLEMS MADE IT FOR YOU TO DO YOUR WORK, TAKE CARE OF THINGS AT HOME, OR GET ALONG WITH OTHER PEOPLE: SOMEWHAT DIFFICULT
SUM OF ALL RESPONSES TO PHQ QUESTIONS 1-9: 16

## 2021-10-26 ASSESSMENT — MIFFLIN-ST. JEOR: SCORE: 1929.96

## 2021-10-26 NOTE — LETTER
October 26, 2021      Herb Figueroa  [address] [place] [phone]        To Whom It May Concern,     Herb Figueroa attended clinic here on Oct 26, 2021.  He can return to work without restrictions starting on November 1, 2021.  Repeat covid testing is not necessary, he has completed his quarantine and is no longer symptotic or infectious.    If you have questions or concerns, please call the clinic at the number listed above.    Sincerely,         Sg Szymanski

## 2021-10-26 NOTE — NURSING NOTE
Patient presents today for follow up on MRI and needs clearance to go back to work after having covid and pneumonia.      Medication Reconciliation Complete    Janeth Garcia LPN  10/26/2021 12:42 PM

## 2021-10-26 NOTE — PROGRESS NOTES
"SUBJECTIVE:  Herb Figueroa is a 46 year old male here for follow-up.  He tested positive for Covid on October 7.  He was then seen in urgent care on October 15, chest x-ray showed multifocal pneumonia.  Despite this he was treated with Augmentin for 7 days and azithromycin for 5 days.  He was scheduled for angiography at the [facility] on October 11 and because of his positive Covid test this was postponed.  It appears that he was rescheduled to October 29.    At his last visit in September he was having neck, shoulder, elbow pain bilaterally as well as carpal tunnel syndrome.  X-rays show degenerative changes.  MRI was performed which showed moderate central canal stenosis and right foraminal narrowing at C5-C6 secondary to disc protrusion potentially causing right C6 radiculopathy.    Allergies:  No Known Allergies    ROS:    As above otherwise ROS is unremarkable.    OBJECTIVE:  /86   Pulse 72   Temp 98  F (36.7  C)   Resp 20   Ht 1.803 m (5' 11\")   Wt 103.8 kg (228 lb 12.8 oz)   SpO2 96%   BMI 31.91 kg/m      EXAM:  General Appearance: Pleasant, alert, appropriate appearance for age. No acute distress  Head: Normal. Normocephalic, atraumatic.  Lungs: Normal chest wall and respirations. Clear to auscultation, no wheezes or crackles.  Cardiovascular: Regular rate and rhythm. S1, S2, no murmurs.  Musculoskeletal: No edema.  Skin: no concerning or new rashes.  Neurologic Exam: CN 2-12 grossly intact.  Normal gait.    Psychiatric Exam: Alert and oriented, appropriate affect.    ASSESSEMENT AND PLAN:    1. Infection due to 2019 novel coronavirus    2. Cervical radiculopathy    3. Cervical stenosis of spinal canal      Discussed his Covid infection.  Repeat testing is not indicated at this time.  He would be available to return to work at this time however given that he has an angiogram scheduled for October 29 because of a positive stress test will have him hold off until we know those results.    We " reviewed his MRI images and findings which show C5-C6 cervical spine stenosis as well as what appears to be impingement upon his right C6 nerve root.  His symptoms are not necessarily just on his right side and are certainly bilateral.  He has known carpal tunnel syndrome bilaterally as well.  We discussed his options at this time he would like to review with a spine specialist so we will refer him to Dr. Ram.    Answers for HPI/ROS submitted by the patient on 10/26/2021  If you checked off any problems, how difficult have these problems made it for you to do your work, take care of things at home, or get along with other people?: Somewhat difficult  PHQ9 TOTAL SCORE: 16  MARGY 7 TOTAL SCORE: 15        William Szymanski MD  Family Medicine

## 2021-10-27 ASSESSMENT — PATIENT HEALTH QUESTIONNAIRE - PHQ9: SUM OF ALL RESPONSES TO PHQ QUESTIONS 1-9: 16

## 2021-10-27 ASSESSMENT — ANXIETY QUESTIONNAIRES: GAD7 TOTAL SCORE: 15

## 2021-10-29 ENCOUNTER — APPOINTMENT (OUTPATIENT)
Dept: LAB | Facility: CLINIC | Age: 46
End: 2021-10-29
Attending: INTERNAL MEDICINE
Payer: COMMERCIAL

## 2021-10-29 ENCOUNTER — APPOINTMENT (OUTPATIENT)
Dept: MEDSURG UNIT | Facility: CLINIC | Age: 46
End: 2021-10-29
Attending: INTERNAL MEDICINE
Payer: COMMERCIAL

## 2021-10-29 ENCOUNTER — HOSPITAL ENCOUNTER (OUTPATIENT)
Facility: CLINIC | Age: 46
Discharge: HOME | End: 2021-10-29
Attending: INTERNAL MEDICINE | Admitting: INTERNAL MEDICINE
Payer: COMMERCIAL

## 2021-10-29 VITALS
DIASTOLIC BLOOD PRESSURE: 82 MMHG | HEIGHT: 71 IN | OXYGEN SATURATION: 98 % | WEIGHT: 228 LBS | SYSTOLIC BLOOD PRESSURE: 120 MMHG | BODY MASS INDEX: 31.92 KG/M2 | RESPIRATION RATE: 16 BRPM | TEMPERATURE: 98.3 F | HEART RATE: 63 BPM

## 2021-10-29 DIAGNOSIS — Z98.890 S/P CARDIAC CATH: ICD-10-CM

## 2021-10-29 DIAGNOSIS — I10 ESSENTIAL HYPERTENSION: ICD-10-CM

## 2021-10-29 DIAGNOSIS — Z98.890 S/P CORONARY ANGIOGRAM: Primary | ICD-10-CM

## 2021-10-29 DIAGNOSIS — R94.39 ABNORMAL STRESS TEST: ICD-10-CM

## 2021-10-29 DIAGNOSIS — Z95.5 HISTORY OF CORONARY ARTERY STENT PLACEMENT: ICD-10-CM

## 2021-10-29 DIAGNOSIS — I25.118 CORONARY ARTERY DISEASE OF NATIVE ARTERY OF NATIVE HEART WITH STABLE ANGINA PECTORIS (H): ICD-10-CM

## 2021-10-29 DIAGNOSIS — I25.10 CORONARY ARTERY DISEASE INVOLVING NATIVE CORONARY ARTERY OF NATIVE HEART WITHOUT ANGINA PECTORIS: ICD-10-CM

## 2021-10-29 DIAGNOSIS — I20.89 STABLE ANGINA (H): ICD-10-CM

## 2021-10-29 LAB
ANION GAP SERPL CALCULATED.3IONS-SCNC: 3 MMOL/L (ref 3–14)
BUN SERPL-MCNC: 15 MG/DL (ref 7–30)
CALCIUM SERPL-MCNC: 9.2 MG/DL (ref 8.5–10.1)
CHLORIDE BLD-SCNC: 102 MMOL/L (ref 94–109)
CO2 SERPL-SCNC: 29 MMOL/L (ref 20–32)
CREAT SERPL-MCNC: 0.94 MG/DL (ref 0.66–1.25)
ERYTHROCYTE [DISTWIDTH] IN BLOOD BY AUTOMATED COUNT: 12.5 % (ref 10–15)
GFR SERPL CREATININE-BSD FRML MDRD: >90 ML/MIN/1.73M2
GLUCOSE BLD-MCNC: 298 MG/DL (ref 70–99)
GLUCOSE BLDC GLUCOMTR-MCNC: 230 MG/DL (ref 70–99)
HCT VFR BLD AUTO: 41.7 % (ref 40–53)
HGB BLD-MCNC: 14.3 G/DL (ref 13.3–17.7)
MCH RBC QN AUTO: 29.2 PG (ref 26.5–33)
MCHC RBC AUTO-ENTMCNC: 34.3 G/DL (ref 31.5–36.5)
MCV RBC AUTO: 85 FL (ref 78–100)
PLATELET # BLD AUTO: 327 10E3/UL (ref 150–450)
POTASSIUM BLD-SCNC: 4.8 MMOL/L (ref 3.4–5.3)
RBC # BLD AUTO: 4.9 10E6/UL (ref 4.4–5.9)
SODIUM SERPL-SCNC: 134 MMOL/L (ref 133–144)
WBC # BLD AUTO: 5.6 10E3/UL (ref 4–11)

## 2021-10-29 PROCEDURE — 250N000011 HC RX IP 250 OP 636: Performed by: INTERNAL MEDICINE

## 2021-10-29 PROCEDURE — 99152 MOD SED SAME PHYS/QHP 5/>YRS: CPT | Performed by: INTERNAL MEDICINE

## 2021-10-29 PROCEDURE — 93458 L HRT ARTERY/VENTRICLE ANGIO: CPT | Performed by: INTERNAL MEDICINE

## 2021-10-29 PROCEDURE — 272N000001 HC OR GENERAL SUPPLY STERILE: Performed by: INTERNAL MEDICINE

## 2021-10-29 PROCEDURE — 258N000003 HC RX IP 258 OP 636: Performed by: INTERNAL MEDICINE

## 2021-10-29 PROCEDURE — 250N000009 HC RX 250: Performed by: INTERNAL MEDICINE

## 2021-10-29 PROCEDURE — 93010 ELECTROCARDIOGRAM REPORT: CPT | Performed by: INTERNAL MEDICINE

## 2021-10-29 PROCEDURE — 80048 BASIC METABOLIC PNL TOTAL CA: CPT | Performed by: INTERNAL MEDICINE

## 2021-10-29 PROCEDURE — 999N000133 HC STATISTIC PP CARE STAGE 2

## 2021-10-29 PROCEDURE — 999N000142 HC STATISTIC PROCEDURE PREP ONLY

## 2021-10-29 PROCEDURE — 36415 COLL VENOUS BLD VENIPUNCTURE: CPT | Performed by: INTERNAL MEDICINE

## 2021-10-29 PROCEDURE — 999N000054 HC STATISTIC EKG NON-CHARGEABLE

## 2021-10-29 PROCEDURE — C1894 INTRO/SHEATH, NON-LASER: HCPCS | Performed by: INTERNAL MEDICINE

## 2021-10-29 PROCEDURE — 85027 COMPLETE CBC AUTOMATED: CPT | Performed by: INTERNAL MEDICINE

## 2021-10-29 PROCEDURE — 82962 GLUCOSE BLOOD TEST: CPT

## 2021-10-29 PROCEDURE — 250N000013 HC RX MED GY IP 250 OP 250 PS 637: Performed by: PHYSICIAN ASSISTANT

## 2021-10-29 PROCEDURE — C1887 CATHETER, GUIDING: HCPCS | Performed by: INTERNAL MEDICINE

## 2021-10-29 RX ORDER — HEPARIN SODIUM 1000 [USP'U]/ML
INJECTION, SOLUTION INTRAVENOUS; SUBCUTANEOUS
Status: DISCONTINUED | OUTPATIENT
Start: 2021-10-29 | End: 2021-10-29 | Stop reason: HOSPADM

## 2021-10-29 RX ORDER — EPTIFIBATIDE 2 MG/ML
180 INJECTION, SOLUTION INTRAVENOUS EVERY 10 MIN PRN
Status: DISCONTINUED | OUTPATIENT
Start: 2021-10-29 | End: 2021-10-29 | Stop reason: HOSPADM

## 2021-10-29 RX ORDER — NICOTINE POLACRILEX 4 MG
15-30 LOZENGE BUCCAL
Status: DISCONTINUED | OUTPATIENT
Start: 2021-10-29 | End: 2021-10-29 | Stop reason: HOSPADM

## 2021-10-29 RX ORDER — NALOXONE HYDROCHLORIDE 0.4 MG/ML
0.2 INJECTION, SOLUTION INTRAMUSCULAR; INTRAVENOUS; SUBCUTANEOUS
Status: DISCONTINUED | OUTPATIENT
Start: 2021-10-29 | End: 2021-10-29 | Stop reason: HOSPADM

## 2021-10-29 RX ORDER — ISOSORBIDE MONONITRATE 30 MG/1
30 TABLET, EXTENDED RELEASE ORAL DAILY
Qty: 30 TABLET | Refills: 3 | Status: SHIPPED | OUTPATIENT
Start: 2021-10-29 | End: 2021-11-23

## 2021-10-29 RX ORDER — LIDOCAINE 40 MG/G
CREAM TOPICAL
Status: DISCONTINUED | OUTPATIENT
Start: 2021-10-29 | End: 2021-10-29 | Stop reason: HOSPADM

## 2021-10-29 RX ORDER — OXYCODONE HYDROCHLORIDE 10 MG/1
10 TABLET ORAL EVERY 4 HOURS PRN
Status: DISCONTINUED | OUTPATIENT
Start: 2021-10-29 | End: 2021-10-29 | Stop reason: HOSPADM

## 2021-10-29 RX ORDER — NITROGLYCERIN 5 MG/ML
VIAL (ML) INTRAVENOUS
Status: DISCONTINUED | OUTPATIENT
Start: 2021-10-29 | End: 2021-10-29 | Stop reason: HOSPADM

## 2021-10-29 RX ORDER — EPTIFIBATIDE 2 MG/ML
2 INJECTION, SOLUTION INTRAVENOUS CONTINUOUS PRN
Status: DISCONTINUED | OUTPATIENT
Start: 2021-10-29 | End: 2021-10-29 | Stop reason: HOSPADM

## 2021-10-29 RX ORDER — OXYCODONE HYDROCHLORIDE 5 MG/1
5 TABLET ORAL EVERY 4 HOURS PRN
Status: DISCONTINUED | OUTPATIENT
Start: 2021-10-29 | End: 2021-10-29 | Stop reason: HOSPADM

## 2021-10-29 RX ORDER — NITROGLYCERIN 20 MG/100ML
10-200 INJECTION INTRAVENOUS CONTINUOUS PRN
Status: DISCONTINUED | OUTPATIENT
Start: 2021-10-29 | End: 2021-10-29 | Stop reason: HOSPADM

## 2021-10-29 RX ORDER — IOPAMIDOL 755 MG/ML
INJECTION, SOLUTION INTRAVASCULAR
Status: DISCONTINUED | OUTPATIENT
Start: 2021-10-29 | End: 2021-10-29 | Stop reason: HOSPADM

## 2021-10-29 RX ORDER — FENTANYL CITRATE 50 UG/ML
INJECTION, SOLUTION INTRAMUSCULAR; INTRAVENOUS
Status: DISCONTINUED | OUTPATIENT
Start: 2021-10-29 | End: 2021-10-29 | Stop reason: HOSPADM

## 2021-10-29 RX ORDER — NALOXONE HYDROCHLORIDE 0.4 MG/ML
0.4 INJECTION, SOLUTION INTRAMUSCULAR; INTRAVENOUS; SUBCUTANEOUS
Status: DISCONTINUED | OUTPATIENT
Start: 2021-10-29 | End: 2021-10-29 | Stop reason: HOSPADM

## 2021-10-29 RX ORDER — POTASSIUM CHLORIDE 750 MG/1
20 TABLET, EXTENDED RELEASE ORAL
Status: DISCONTINUED | OUTPATIENT
Start: 2021-10-29 | End: 2021-10-29 | Stop reason: HOSPADM

## 2021-10-29 RX ORDER — NICARDIPINE HYDROCHLORIDE 2.5 MG/ML
INJECTION INTRAVENOUS
Status: DISCONTINUED | OUTPATIENT
Start: 2021-10-29 | End: 2021-10-29 | Stop reason: HOSPADM

## 2021-10-29 RX ORDER — ATROPINE SULFATE 0.1 MG/ML
0.5 INJECTION INTRAVENOUS
Status: DISCONTINUED | OUTPATIENT
Start: 2021-10-29 | End: 2021-10-29 | Stop reason: HOSPADM

## 2021-10-29 RX ORDER — DEXTROSE MONOHYDRATE 25 G/50ML
25-50 INJECTION, SOLUTION INTRAVENOUS
Status: DISCONTINUED | OUTPATIENT
Start: 2021-10-29 | End: 2021-10-29 | Stop reason: HOSPADM

## 2021-10-29 RX ORDER — DOPAMINE HYDROCHLORIDE 160 MG/100ML
2-20 INJECTION, SOLUTION INTRAVENOUS CONTINUOUS PRN
Status: DISCONTINUED | OUTPATIENT
Start: 2021-10-29 | End: 2021-10-29 | Stop reason: HOSPADM

## 2021-10-29 RX ORDER — ARGATROBAN 1 MG/ML
350 INJECTION, SOLUTION INTRAVENOUS
Status: DISCONTINUED | OUTPATIENT
Start: 2021-10-29 | End: 2021-10-29 | Stop reason: HOSPADM

## 2021-10-29 RX ORDER — ARGATROBAN 1 MG/ML
150 INJECTION, SOLUTION INTRAVENOUS
Status: DISCONTINUED | OUTPATIENT
Start: 2021-10-29 | End: 2021-10-29 | Stop reason: HOSPADM

## 2021-10-29 RX ORDER — SODIUM CHLORIDE 9 MG/ML
INJECTION, SOLUTION INTRAVENOUS CONTINUOUS
Status: DISCONTINUED | OUTPATIENT
Start: 2021-10-29 | End: 2021-10-29 | Stop reason: HOSPADM

## 2021-10-29 RX ORDER — HEPARIN SODIUM 10000 [USP'U]/100ML
100-1000 INJECTION, SOLUTION INTRAVENOUS CONTINUOUS PRN
Status: DISCONTINUED | OUTPATIENT
Start: 2021-10-29 | End: 2021-10-29 | Stop reason: HOSPADM

## 2021-10-29 RX ORDER — DOBUTAMINE HYDROCHLORIDE 200 MG/100ML
2-20 INJECTION INTRAVENOUS CONTINUOUS PRN
Status: DISCONTINUED | OUTPATIENT
Start: 2021-10-29 | End: 2021-10-29 | Stop reason: HOSPADM

## 2021-10-29 RX ORDER — CARVEDILOL 12.5 MG/1
25 TABLET ORAL 2 TIMES DAILY WITH MEALS
Qty: 180 TABLET | Refills: 3 | Status: SHIPPED | OUTPATIENT
Start: 2021-10-29 | End: 2022-09-22

## 2021-10-29 RX ORDER — TIROFIBAN HYDROCHLORIDE 50 UG/ML
0.15 INJECTION INTRAVENOUS CONTINUOUS PRN
Status: DISCONTINUED | OUTPATIENT
Start: 2021-10-29 | End: 2021-10-29 | Stop reason: HOSPADM

## 2021-10-29 RX ORDER — POTASSIUM CHLORIDE 750 MG/1
40 TABLET, EXTENDED RELEASE ORAL
Status: DISCONTINUED | OUTPATIENT
Start: 2021-10-29 | End: 2021-10-29 | Stop reason: HOSPADM

## 2021-10-29 RX ORDER — ASPIRIN 81 MG/1
243 TABLET, CHEWABLE ORAL ONCE
Status: COMPLETED | OUTPATIENT
Start: 2021-10-29 | End: 2021-10-29

## 2021-10-29 RX ORDER — FLUMAZENIL 0.1 MG/ML
0.2 INJECTION, SOLUTION INTRAVENOUS
Status: DISCONTINUED | OUTPATIENT
Start: 2021-10-29 | End: 2021-10-29 | Stop reason: HOSPADM

## 2021-10-29 RX ORDER — FENTANYL CITRATE 50 UG/ML
25 INJECTION, SOLUTION INTRAMUSCULAR; INTRAVENOUS
Status: DISCONTINUED | OUTPATIENT
Start: 2021-10-29 | End: 2021-10-29 | Stop reason: HOSPADM

## 2021-10-29 RX ADMIN — SODIUM CHLORIDE: 9 INJECTION, SOLUTION INTRAVENOUS at 12:14

## 2021-10-29 RX ADMIN — ASPIRIN 81 MG CHEWABLE TABLET 243 MG: 81 TABLET CHEWABLE at 13:18

## 2021-10-29 ASSESSMENT — MIFFLIN-ST. JEOR: SCORE: 1926.33

## 2021-10-29 NOTE — PROGRESS NOTES
Pt roomed in bay 34. Arrived via stretcher accompanied by CCL RN off monitor. VSS. Rhythm upon arrival is SR on the room monitor. Denies pain and SOB. Reviewed activity and restrictions with pt and to notify RN of any symptom change. CCL access: right radial TR band in place, no hematoma, and denies pain. Will continue to monitor status.

## 2021-10-29 NOTE — PROGRESS NOTES
Pt arrives to 2a for [facility]. H&P is up to date. Consent needs to be signed. Pt reports he has constant dull chest pain, 4/10. Not new per pt. Sent page to William, NIKKY. Pt prepped.   [id number] William, NIKKY aware blood sugar 298

## 2021-10-29 NOTE — PROGRESS NOTES
Discussed BG with CSI fellow. No orders to treat BG. Pt instructed to restart metformin tomorrow, 10/30/21, per CSI fellow.

## 2021-10-29 NOTE — PRE-PROCEDURE
GENERAL PRE-PROCEDURE:   Procedure:  Coronary angiogram  Date/Time:  10/29/2021 1:01 PM    Verbal consent obtained?: Yes    Written consent obtained?: Yes    Risks and benefits: Risks, benefits and alternatives were discussed    Consent given by:  Patient  Patient states understanding of procedure being performed: Yes    Patient's understanding of procedure matches consent: Yes    Procedure consent matches procedure scheduled: Yes    Expected level of sedation:  Moderate  Appropriately NPO:  Yes  Mallampati  :  Grade 2- soft palate, base of uvula, tonsillar pillars, and portion of posterior pharyngeal wall visible  Lungs:  Lungs clear with good breath sounds bilaterally  Heart:  Normal heart sounds and rate  History & Physical reviewed:  History and physical reviewed and no updates needed  Statement of review:  I have reviewed the lab findings, diagnostic data, medications, and the plan for sedation        Sg Saul PA-C  [facility] Cardiology Team

## 2021-10-29 NOTE — Clinical Note
dry, intact, no bleeding and no hematoma. RRA sheath removed, TR-Band applied with 12 ml of air, arm board

## 2021-10-31 LAB
ATRIAL RATE - MUSE: 70 BPM
DIASTOLIC BLOOD PRESSURE - MUSE: NORMAL MMHG
INTERPRETATION ECG - MUSE: NORMAL
P AXIS - MUSE: 45 DEGREES
PR INTERVAL - MUSE: 208 MS
QRS DURATION - MUSE: 94 MS
QT - MUSE: 412 MS
QTC - MUSE: 444 MS
R AXIS - MUSE: -1 DEGREES
SYSTOLIC BLOOD PRESSURE - MUSE: NORMAL MMHG
T AXIS - MUSE: 24 DEGREES
VENTRICULAR RATE- MUSE: 70 BPM

## 2021-11-22 NOTE — PROGRESS NOTES
[facility] HEART [facility]   CARDIOLOGY PROGRESS NOTE     Chief Complaint   Patient presents with     Follow Up     angio          Diagnosis:  1.  S/P cardiac cath at [facility] on 9/8/2020. x2 stents to [facility] of [facility] on 10/29/2021 stable disease, no stent.  80% ramus felt unamenable to stenting.  2.  Abnormal stress test on 8/24/2020-false positive.  3.  CAD.  4.  Morbid obesity 220+ pounds.  5.  Hyperlipidemia-uncontrolled.  6.  Hypertension-controlled.  7.  DM-2-controlled.  8.  SCOTT.  9.  GERD.  10.  Mild asthma.  11.  H/O tobacco abuse quitting in 2013-1/2 pack a day.  12.  SCOTT-to see sleep med in [place] on 12/3/21.   13.  COVID-19 (+) on 10/7/2021.  10.  Vitamin D deficiency.    Assessment/Plan:    1.  He is here in follow-up to his cardiac catheterization.  20% in-stent stenosis of the LAD, 40% lesion in native vessel, 80% ramus which was not stented, and 50% circumflex.  RCA and left main patent.  Patient with ongoing chest pain.  Will try medical management.  If this fails, will refer to [facility] in [place] for consideration of intervention of his ramus.  2.  Increase Imdur from 30 to 60 mg daily.  3.  Increase Crestor from 20 mg to 40 mg daily.  4.  Continue on aspirin 81 mg daily, Coreg 25 mg twice a day, Plavix 75 mg daily, hydrochlorothiazide 25 mg daily, lisinopril 40 mg daily, sublingual nitro as needed for chest pain, and Prilosec 40 mg daily.  5.  His vitamin D level has been low and he has been told to take 2000 to 3000 international units of vitamin D over-the-counter.  6.  He is planned for sleep study on 12/3/2021.  7.  He is planned to [facility] to neck issues on 12/21/2021.  8.  Reviewed that he was Covid positive on 10/7/2021.  It sounds like he is having ongoing issues.  Likely a long hauler.  9.  Follow-up in 3 months or sooner with issues.  If he has ongoing chest pain, he can call and let us know and we will refer him to [facility] in [place].        Interval  history:  Previously, Herb was referred to [facility] secondary to a grossly abnormal stress test on 8/24/2020.  He had wall motion abnormalities consistent with LAD territory ischemia.  EF went from 50-55% at rest to 45-50% with activity with dilation of the LV cavity.  This resulted in x2 stents to his LAD secondary to a 70% lesion.  The rest of the vessels were found to have mild disease.  His symptoms have returned.  He describes dyspnea on exertion and chest tightness.  He was sent to the [facility] for cardiac catheterization.  Cardiac catheterization completed on 10/29/2021.  He was found to have stable disease.  The ramus was 80% blocked and not stented.  His stent to the LAD had a 20%.  The remainder blockages were less than 70%.  It was suggested he try medical management.  Today, we increased Crestor and Imdur as outlined above.  If his symptoms do not improve, he wishes to have a second opinion in [place] at [facility] for consideration of stenting of the ramus.  We will see him in 3 months or if his symptoms are significant in the future, he will let us know and the referral for the cardiac catheterization will be placed.  He plans to see Dr. Ram for his neck.  He is planned to see sleep medicine in early December 2021.      HPI:    Mr. Figueroa is being seen by cardiology for stable angina.  He had a abnormal stress echo suggesting LAD stenosis on 8/24/2020.  There is also history of morbid obesity with a weight of 231 today, hyperlipidemia-uncontrolled, hypertension-uncontrolled, WC-2-uxedjsfdecdm, SCOTT, GERD, mild asthma, history of tobacco abuse quitting smoking in approximately 2013 had a half a pack a day.    He had described substernal left and right precordial chest tightness.  His discomfort radiating to his neck and between his shoulder blades.  His discomfort has been exertional.  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  His symptoms have been  going on for the last month.  Describes it as sharp/squeezing to his chest lasting 5 to 10 minutes.  With that he has been diaphoretic, nauseous occasionally and has been short of breath.  He denied vomiting, but never feels fully rested.  He has been sleeping between 6 and 8 hours at night.  He has been sleeping well but continues to be fatigued.  Symptoms are somewhat comparable to symptoms he had previously which resulted in stenting.  I suggested he have stress echo which he was agreeable to.  He does not think he would be able to walk on a treadmill but would be able do an exercise bike.  He also has been having right shoulder, elbow, wrist, and carpal tunnel discomfort.  He is needing Crestor and sublingual nitro refilled.  I suggested he follow-up with sleep medicine as he is struggling to wear his CPAP consistently.     He reports for the last 6 to 12 months, he has been having substernal chest pressure with radiation to his neck, left shoulder, and down his left arm. Generally, this is brought on by emotional stress.  More recently, he has seen increased episodes with activity.  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  He works in a building of 7 people.  He states at times this can be labor-intensive.  With cutting this paper, he describes a tightness to his chest as outlined below.  Other times, his discomfort has a sharp component.  His chest discomfort is more often brought on by anxiety and stress.  He reports his daughter was killed in a motor vehicle accident at age 12, 18 years ago.  This resulted the divorce of his first wife 10 years ago.  He is remarried but has ongoing anxiety.  He states he misses his daughter every day.  His risk factors include history of tobacco abuse quitting approximate 7 years ago which would be around 2013-1/2 pack a day, uncontrolled diabetes  mellitus with an A1c of 8.3% on 8/18/2020.  Cholesterol has been severely uncontrolled with a total cholesterol of 262, , and triglycerides of 193 on 11/14/2017.  He has a family history of heart disease with an uncle having bypass today, 8/27/2020.  His mom has history of heart disease and his dad had heart disease with diabetes. With his chest discomfort, he admits to diaphoresis, nausea, shortness of breath, dizziness, but no vomiting.  His symptoms will last up to 5 minutes.  He has not been on aspirin or sublingual nitro.  His blood pressure today is severely uncontrolled at 196/98.      He went on to have a stress echo on 8/24/2020.  Test was felt to be high risk.  He had mid anterior, basal with mid anterior septal hypokinesis suggesting LAD territory.  Ejection fraction was 55 to 60% and went to 45 to 50% with mild dilatation of the LV cavity.     Based on his symptoms, history, and abnormal stress test, it was suggested he have a cardiac catheterization sooner than later.  [facility] booked [facility] a [facility].  He will be scheduled for [facility] in a couple of weeks.      Relevant testing:  Cardiac cath at the [facility] on 10/20/2021:  LM: Patent  LAD: Up to 40%.  Ramus: 80%.  LCx: 50%.    Stress echo on 8/24/2020:  Abnormal, high-risk exercise stress echocardiogram at sub-maximal exercise capacity.   The target heart rate was not achieved (80% of predicted). At peak exercise, there is hypokinesis of the mid anterior and basal and mid anteroseptum,  segments. This is suggestive of LAD territory ischemia.   Normal LV size and function at baseline. The LVEF is 55-60% at rest and declined to 45-50% after exercise with mild dilation of the LV cavity.   Heart rate and blood pressure response to exercise were normal.   Normal functional capacity. The patient had chest pain during peak exercise.   The test was terminated due to fatigue.   No significant valvular or aortic abnormalities noted on screening  tomograms.       Past Medical History:   Diagnosis Date     Encounter for screening for cardiovascular disorders     02-11, Negative stress test     Essential (primary) hypertension     No Comments Provided     Gastro-esophageal reflux disease without esophagitis     No Comments Provided     Major depressive disorder, single episode     After the death of his daughter in MVA     Mild intermittent asthma, uncomplicated     No Comments Provided     Nicotine dependence, uncomplicated     age [age], 1-2 packs per day, Quit Aug 2005     Suicidal ideations     2013,Hospitalized in [place]       Past Surgical History:   Procedure Laterality Date     CV CORONARY ANGIOGRAM N/A 10/29/2021    Procedure: CV CORONARY ANGIOGRAM;  Surgeon: Juan Hdz MD;  Location:  HEART CARDIAC CATH LAB     LAPAROSCOPIC CHOLECYSTECTOMY      11-05,Dr. Dunne     TYMPANOSTOMY, LOCAL/TOPICAL ANESTHESIA      No Comments Provided       No Known Allergies    Current Outpatient Medications   Medication Sig Dispense Refill     aspirin (ASA) 81 MG chewable tablet TAKE 1 TABLET (81 MG) BY MOUTH DAILY 90 tablet 3     blood glucose (NO BRAND SPECIFIED) test strip Use to test blood sugar 1 times daily. 100 each 11     blood glucose monitoring (ACCU-CHEK LILIANA PLUS) meter device kit NEEDS METER,STRIPS,LANCETS 1 kit 1     blood glucose monitoring (ACCU-CHEK MULTICLIX) lancets Use to test blood sugar 1 times daily. 102 each 11     carvedilol (COREG) 12.5 MG tablet Take 2 tablets (25 mg) by mouth 2 times daily (with meals) 180 tablet 3     citalopram (CELEXA) 20 MG tablet TAKE 1 TABLET (20 MG) BY MOUTH DAILY 90 tablet 3     clopidogrel (PLAVIX) 75 MG tablet Take 1 tablet (75 mg) by mouth daily 90 tablet 3     gabapentin (NEURONTIN) 300 MG capsule TAKE 2 CAPSULES BY MOUTH THREE TIMES DAILY 180 capsule 11     glipiZIDE (GLUCOTROL XL) 10 MG 24 hr tablet TAKE 1 TAB BY MOUTH ONCE DAILY 90 tablet 3     hydrochlorothiazide (HYDRODIURIL) 25 MG tablet TAKE  1 TABLET (25 MG) BY MOUTH DAILY 90 tablet 3     isosorbide mononitrate (IMDUR) 60 MG 24 hr tablet Take 1 tablet (60 mg) by mouth daily 90 tablet 3     lisinopril (ZESTRIL) 40 MG tablet TAKE 1 TAB BY MOUTH ONCE DAILY 90 tablet 3     meloxicam (MOBIC) 15 MG tablet TAKE 1 TAB BY MOUTH ONCE DAILY 90 tablet 3     metFORMIN (GLUCOPHAGE) 1000 MG tablet TAKE 1 TABLET BY MOUTH TWICE A DAY WITH MEALS 180 tablet 3     nitroGLYcerin (NITROSTAT) 0.4 MG sublingual tablet For chest pain place 1 tablet under the tongue every 5 minutes for 3 doses. If symptoms persist 5 minutes after 1st dose call 911. 25 tablet 3     omeprazole (PRILOSEC) 40 MG DR capsule Take 1 capsule (40 mg) by mouth daily with food 90 capsule 3     rosuvastatin (CRESTOR) 40 MG tablet Take 1 tablet (40 mg) by mouth daily 90 tablet 3       Social History     Socioeconomic History     Marital status: Legally      Spouse name: Not on file     Number of children: Not on file     Years of education: Not on file     Highest education level: Not on file   Occupational History     Not on file   Tobacco Use     Smoking status: Former Smoker     Packs/day: 0.00     Years: 20.00     Pack years: 0.00     Types: Cigarettes     Quit date: 2013     Years since quittin.3     Smokeless tobacco: Never Used   Vaping Use     Vaping Use: Never used   Substance and Sexual Activity     Alcohol use: Yes     Comment: Alcoholic Drinks/day: occasionally-rare 4 tomes a year     Drug use: Not Currently     Sexual activity: Not Currently   Other Topics Concern     Parent/sibling w/ CABG, MI or angioplasty before 65F 55M? Not Asked   Social History Narrative    .  Two sons.      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      Works as a  at [employer].      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     Social Determinants of Health     Financial Resource Strain: Not on file    Food Insecurity: Not on file   Transportation Needs: Not on file   Physical Activity: Not on file   Stress: Not on file   Social Connections: Not on file   Intimate Partner Violence: Not on file   Housing Stability: Not on file       LAB RESULTS:   Transferred Records on 09/09/2020   Component Date Value Ref Range Status     Potassium 09/09/2020 3.7  3.5 - 5.1 mmol/L Final     Glucose 09/09/2020 151* 60 - 99 mg/dL Final     Creatinine 09/09/2020 0.90  0.80 - 1.50 mg/dL Final     GFR Estimate 09/09/2020 >60  SEE SCAN ml/min/1.73m2 Final   Allied Health/Nurse Visit on 09/05/2020   Component Date Value Ref Range Status     COVID-19 Virus PCR to [facility]* 09/05/2020 Nasopharyngeal   Final     COVID-19 Virus PCR to [facility] - Re* 09/05/2020 Not Detected   Final   Orders Only on 09/02/2020   Component Date Value Ref Range Status     Sodium 09/02/2020 141  134 - 144 mmol/L Final     Potassium 09/02/2020 3.9  3.5 - 5.1 mmol/L Final     Chloride 09/02/2020 99  98 - 107 mmol/L Final     Carbon Dioxide 09/02/2020 33* 21 - 31 mmol/L Final     Anion Gap 09/02/2020 9  3 - 14 mmol/L Final     Glucose 09/02/2020 187* 70 - 105 mg/dL Final     Urea Nitrogen 09/02/2020 12  7 - 25 mg/dL Final     Creatinine 09/02/2020 1.04  0.70 - 1.30 mg/dL Final     GFR Estimate 09/02/2020 77  >60 mL/min/[1.73_m2] Final     GFR Estimate If Black 09/02/2020 >90  >60 mL/min/[1.73_m2] Final     Calcium 09/02/2020 9.7  8.6 - 10.3 mg/dL Final     WBC 09/02/2020 4.5  4.0 - 11.0 10e9/L Final     RBC Count 09/02/2020 5.41  4.4 - 5.9 10e12/L Final     Hemoglobin 09/02/2020 15.6  13.3 - 17.7 g/dL Final     Hematocrit 09/02/2020 46.4  40.0 - 53.0 % Final     MCV 09/02/2020 86  78 - 100 fl Final     MCH 09/02/2020 28.8  26.5 - 33.0 pg Final     MCHC 09/02/2020 33.6  31.5 - 36.5 g/dL Final     RDW 09/02/2020 11.9  10.0 - 15.0 % Final     Platelet Count 09/02/2020 157  150 - 450 10e9/L Final   Office Visit on 08/27/2020   Component Date Value Ref Range Status  "    Interpretation ECG 08/27/2020 Click View Image link to view waveform and result   Final   Office Visit on 08/18/2020   Component Date Value Ref Range Status     Hemoglobin A1C 08/18/2020 8.3* 4.0 - 6.0 % Final        Review of systems: Negative except that which was noted in the HPI.    Physical examination:  BP (!) 148/82 (BP Location: Right arm, Patient Position: Sitting, Cuff Size: Adult Large)   Pulse 63   Temp 98  F (36.7  C) (Tympanic)   Resp 18   Ht 1.8 m (5' 10.87\")   Wt 110.7 kg (244 lb)   SpO2 98%   BMI 34.16 kg/m      GENERAL APPEARANCE: healthy, alert and no distress  HEENT: no icterus, no xanthelasmas, normal pupil size and reaction, no cyanosis.  NECK: no adenopathy, no asymmetry, masses, or scars.  CHEST: lungs clear to auscultation - no rales, rhonchi or wheezes, no use of accessory muscles, no retractions, respirations are unlabored, normal respiratory rate  CARDIOVASCULAR: regular rhythm, normal S1 with physiologic split S2, no S3 or S4 and no murmur, click or rub  ABDOMEN: soft, non tender, bowel sounds normal  EXTREMITIES: no clubbing, cyanosis or edema  NEURO: alert and oriented normal speech, and affect  VASC: No vascular bruits heard.  SKIN: no ecchymoses, no rashes      Thank you for allowing me to participate in the care of your patient. Please do not hesitate to contact me if you have any questions.     Sg Ortiz, DO          "

## 2021-11-23 ENCOUNTER — OFFICE VISIT (OUTPATIENT)
Dept: CARDIOLOGY | Facility: OTHER | Age: 46
End: 2021-11-23
Attending: INTERNAL MEDICINE
Payer: COMMERCIAL

## 2021-11-23 VITALS
TEMPERATURE: 98 F | DIASTOLIC BLOOD PRESSURE: 82 MMHG | OXYGEN SATURATION: 98 % | HEART RATE: 63 BPM | RESPIRATION RATE: 18 BRPM | SYSTOLIC BLOOD PRESSURE: 148 MMHG | HEIGHT: 71 IN | BODY MASS INDEX: 34.16 KG/M2 | WEIGHT: 244 LBS

## 2021-11-23 DIAGNOSIS — R53.83 FATIGUE, UNSPECIFIED TYPE: ICD-10-CM

## 2021-11-23 DIAGNOSIS — Z95.5 HISTORY OF CORONARY ARTERY STENT PLACEMENT: ICD-10-CM

## 2021-11-23 DIAGNOSIS — E78.2 MIXED HYPERLIPIDEMIA: ICD-10-CM

## 2021-11-23 DIAGNOSIS — J45.909 MILD ASTHMA WITHOUT COMPLICATION, UNSPECIFIED WHETHER PERSISTENT: ICD-10-CM

## 2021-11-23 DIAGNOSIS — G47.33 OSA ON CPAP: ICD-10-CM

## 2021-11-23 DIAGNOSIS — R94.39 ABNORMAL STRESS TEST: ICD-10-CM

## 2021-11-23 DIAGNOSIS — E66.01 MORBID OBESITY (H): ICD-10-CM

## 2021-11-23 DIAGNOSIS — K21.9 GASTROESOPHAGEAL REFLUX DISEASE WITHOUT ESOPHAGITIS: ICD-10-CM

## 2021-11-23 DIAGNOSIS — E11.9 TYPE 2 DIABETES MELLITUS WITHOUT COMPLICATION, WITHOUT LONG-TERM CURRENT USE OF INSULIN (H): ICD-10-CM

## 2021-11-23 DIAGNOSIS — Z87.891 HISTORY OF TOBACCO ABUSE: ICD-10-CM

## 2021-11-23 DIAGNOSIS — I25.118 CORONARY ARTERY DISEASE OF NATIVE ARTERY OF NATIVE HEART WITH STABLE ANGINA PECTORIS (H): ICD-10-CM

## 2021-11-23 DIAGNOSIS — Z98.890 STATUS POST CORONARY ANGIOGRAM: ICD-10-CM

## 2021-11-23 DIAGNOSIS — Z98.890 S/P CARDIAC CATH: ICD-10-CM

## 2021-11-23 DIAGNOSIS — I25.10 CORONARY ARTERY DISEASE INVOLVING NATIVE CORONARY ARTERY OF NATIVE HEART WITHOUT ANGINA PECTORIS: ICD-10-CM

## 2021-11-23 DIAGNOSIS — I20.89 STABLE ANGINA (H): Primary | ICD-10-CM

## 2021-11-23 DIAGNOSIS — I10 ESSENTIAL HYPERTENSION: ICD-10-CM

## 2021-11-23 PROCEDURE — G0463 HOSPITAL OUTPT CLINIC VISIT: HCPCS

## 2021-11-23 PROCEDURE — 99214 OFFICE O/P EST MOD 30 MIN: CPT | Performed by: INTERNAL MEDICINE

## 2021-11-23 RX ORDER — ISOSORBIDE MONONITRATE 60 MG/1
60 TABLET, EXTENDED RELEASE ORAL DAILY
Qty: 90 TABLET | Refills: 3 | Status: SHIPPED | OUTPATIENT
Start: 2021-11-23 | End: 2022-09-22

## 2021-11-23 RX ORDER — ROSUVASTATIN CALCIUM 40 MG/1
40 TABLET, COATED ORAL DAILY
Qty: 90 TABLET | Refills: 3 | Status: SHIPPED | OUTPATIENT
Start: 2021-11-23 | End: 2022-09-22

## 2021-11-23 ASSESSMENT — PAIN SCALES - GENERAL: PAINLEVEL: MODERATE PAIN (5)

## 2021-11-23 ASSESSMENT — MIFFLIN-ST. JEOR: SCORE: 1996.78

## 2021-11-23 NOTE — NURSING NOTE
"Patient comes in for follow up on angiogram.  Judit Jane LPN ....................11/23/2021   11:17 AM  Chief Complaint   Patient presents with     Follow Up     angio       Initial BP (!) 162/96 (BP Location: Right arm, Patient Position: Sitting, Cuff Size: Adult Large)   Pulse 63   Temp 98  F (36.7  C) (Tympanic)   Resp 18   Ht 1.8 m (5' 10.87\")   Wt 110.7 kg (244 lb)   SpO2 98%   BMI 34.16 kg/m   Estimated body mass index is 34.16 kg/m  as calculated from the following:    Height as of this encounter: 1.8 m (5' 10.87\").    Weight as of this encounter: 110.7 kg (244 lb).  Meds Reconciled: complete  Pt is on Aspirin  Pt is on a Statin  PHQ and/or MARGY reviewed. Pt referred to PCP/MH Provider as appropriate.    Judit Jane LPN      "

## 2021-12-07 ENCOUNTER — TRANSFERRED RECORDS (OUTPATIENT)
Dept: HEALTH INFORMATION MANAGEMENT | Facility: OTHER | Age: 46
End: 2021-12-07
Payer: COMMERCIAL

## 2021-12-07 LAB — RETINOPATHY: POSITIVE

## 2021-12-14 ENCOUNTER — HOSPITAL ENCOUNTER (OUTPATIENT)
Dept: GENERAL RADIOLOGY | Facility: OTHER | Age: 46
End: 2021-12-14
Attending: NURSE PRACTITIONER
Payer: COMMERCIAL

## 2021-12-14 ENCOUNTER — OFFICE VISIT (OUTPATIENT)
Dept: FAMILY MEDICINE | Facility: OTHER | Age: 46
End: 2021-12-14
Attending: NURSE PRACTITIONER
Payer: COMMERCIAL

## 2021-12-14 VITALS
SYSTOLIC BLOOD PRESSURE: 150 MMHG | TEMPERATURE: 97.3 F | HEART RATE: 72 BPM | BODY MASS INDEX: 34.51 KG/M2 | WEIGHT: 246.5 LBS | RESPIRATION RATE: 12 BRPM | DIASTOLIC BLOOD PRESSURE: 86 MMHG

## 2021-12-14 DIAGNOSIS — S89.92XA INJURY OF LEFT SHIN, INITIAL ENCOUNTER: Primary | ICD-10-CM

## 2021-12-14 DIAGNOSIS — L03.116 CELLULITIS OF LEFT LOWER EXTREMITY: ICD-10-CM

## 2021-12-14 PROCEDURE — G0463 HOSPITAL OUTPT CLINIC VISIT: HCPCS

## 2021-12-14 PROCEDURE — 99213 OFFICE O/P EST LOW 20 MIN: CPT | Performed by: NURSE PRACTITIONER

## 2021-12-14 PROCEDURE — 73610 X-RAY EXAM OF ANKLE: CPT | Mod: LT

## 2021-12-14 PROCEDURE — G0463 HOSPITAL OUTPT CLINIC VISIT: HCPCS | Mod: 25

## 2021-12-14 PROCEDURE — 73590 X-RAY EXAM OF LOWER LEG: CPT | Mod: LT

## 2021-12-14 RX ORDER — CEPHALEXIN 500 MG/1
500 CAPSULE ORAL 3 TIMES DAILY
Qty: 21 CAPSULE | Refills: 0 | Status: SHIPPED | OUTPATIENT
Start: 2021-12-14 | End: 2021-12-21

## 2021-12-14 ASSESSMENT — PAIN SCALES - GENERAL: PAINLEVEL: MODERATE PAIN (5)

## 2021-12-14 NOTE — PATIENT INSTRUCTIONS
Patient Education     Cellulitis  Cellulitis is an infection of the deep layers of skin. A break in the skin, such as a cut or scratch, can let bacteria under the skin. If the bacteria get to deep layers of the skin, it can be serious. If not treated, cellulitis can get into the bloodstream and lymph nodes. The infection can then spread throughout the body. This causes serious illness.   Cellulitis causes the affected skin to become red, swollen, warm, and sore. The reddened areas have a visible border. An open sore may leak fluid (pus). You may have a fever, chills, and pain.   Cellulitis is treated with antibiotics taken for 7 to 10 days. An open sore may be cleaned and covered with cool wet gauze. Symptoms should get better 1 to 2 days after treatment is started. Make sure to take all the antibiotics for the full number of days until they are gone. Keep taking the medicine even if your symptoms go away.   Home care  Follow these tips:    Limit the use of the part of your body with cellulitis.     If the infection is on your leg, keep your leg raised while sitting. This helps reduce swelling.    Take all of the antibiotic medicine exactly as directed until it is gone. Don't miss any doses, especially during the first 7 days. Don t stop taking the medicine when your symptoms get better.    Keep the affected area clean and dry.    Wash your hands with soap and clean, running water before and after touching your skin. Anyone else who touches your skin should also wash his or her hands. Don't share towels.  Follow-up care  Follow up with your healthcare provider, or as advised. If your infection doesn't go away on the first antibiotic, your healthcare provider will prescribe a different one.   When to seek medical advice  Call your healthcare provider right away if any of these occur:    Red areas that spread    Swelling or pain that gets worse    Fluid leaking from the skin (pus)    Fever higher of 100.4  F (38.0   C) or higher after 2 days on antibiotics  Gerardo last reviewed this educational content on 8/1/2019 2000-2021 [employer]. All rights reserved. This information is not intended as a substitute for professional medical care. Always follow your healthcare professional's instructions.

## 2021-12-14 NOTE — NURSING NOTE
"Chief Complaint   Patient presents with     Foot Pain     left foot/ankle/leg pain. shin is red, burns, and hurts. Ankle is bruised.      Patient stated over the few weeks he has dropped a eryn and a log on his leg/foot, and has ran into a drop hitch.     Initial BP (!) 150/86 (BP Location: Left arm, Patient Position: Sitting, Cuff Size: Adult Regular)   Pulse 72   Temp 97.3  F (36.3  C) (Tympanic)   Resp 12   Wt 111.8 kg (246 lb 8 oz)   BMI 34.51 kg/m   Estimated body mass index is 34.51 kg/m  as calculated from the following:    Height as of 11/23/21: 1.8 m (5' 10.87\").    Weight as of this encounter: 111.8 kg (246 lb 8 oz).  Medication Reconciliation: Completed     Advanced Care Directive Reviewed    Tam Gutierrez LPN  "

## 2021-12-14 NOTE — PROGRESS NOTES
ASSESSMENT/PLAN:  1. Injury of left shin, initial encounter    - XR Tibia & Fibula Left 2 Views  - XR Ankle Left G/E 3 Views    2. Cellulitis of left lower extremity    - cephALEXin (KEFLEX) 500 MG capsule; Take 1 capsule (500 mg) by mouth 3 times daily for 7 days  Dispense: 21 capsule; Refill: 0      Xray and radiology report were reviewed.     Discussed xray results with the patient and informed him that both the Left tibia/ fibula and left ankle x-rays showed no acute fractures.  However, radiology did note there is a bony irregularity adjacent to the tips of the medial and lateral malleoli consistent with old injuries.    Discussed with the patient that the ecchymosis of his medial malleolus is consistent with a soft tissue injury.     Discussed with patient that based on his symptoms and physical exam findings the erythema and swelling of his left anterior shin is most consistent with cellulitis.  Patient was prescribed Keflex 3 times daily x7 days.  Discussed with patient that a DVT is not less likely due to physical exam findings and the patient already taking Plavix and aspirin daily.    A follow-up appointment was scheduled for 12/20/2021.    Instructed the patient to elevate his left lower extremity.     May use over-the-counter Tylenol or ibuprofen PRN    Discussed warning signs/symptoms indicative of need to f/u    Follow up if symptoms persist or worsen or concerns      I explained my diagnostic considerations and recommendations to the patient, who voiced understanding and agreement with the treatment plan. All questions were answered. We discussed potential side effects of any prescribed or recommended therapies, as well as expectations for response to treatments.        HPI:    Herb Figueroa is a 46 year old male  who presents to [facility] today for erythema has been to his left shin that started over the last couple of weeks. States that he just noticed the swelling to his left shin and  bruising to his medial aspect of his left ankle this morning. The patient states that he has ran into a hitch, a eryn hit his shin and a log hit his shin recently. The patient states that he has been limping on the left leg. Patient reports a history of diabetes.  Patient is currently taking Plavix and aspirin daily, prescribed by his cardiologist.  Patient denies any history of DVTs.  Denies significant pain to the posterior aspect of his left lower extremity. Denies any known fevers.     Past Medical History:   Diagnosis Date     Encounter for screening for cardiovascular disorders     , Negative stress test     Essential (primary) hypertension     No Comments Provided     Gastro-esophageal reflux disease without esophagitis     No Comments Provided     Major depressive disorder, single episode     After the death of his daughter in MVA     Mild intermittent asthma, uncomplicated     No Comments Provided     Nicotine dependence, uncomplicated     age [age], 1-2 packs per day, Quit Aug 2005     Suicidal ideations     ,Hospitalized in [place]     Past Surgical History:   Procedure Laterality Date     CV CORONARY ANGIOGRAM N/A 10/29/2021    Procedure: CV CORONARY ANGIOGRAM;  Surgeon: Juan Hdz MD;  Location:  [facility]     LAPAROSCOPIC CHOLECYSTECTOMY      ,Dr. Dunne     TYMPANOSTOMY, LOCAL/TOPICAL ANESTHESIA      No Comments Provided     Social History     Tobacco Use     Smoking status: Former Smoker     Packs/day: 0.00     Years: 20.00     Pack years: 0.00     Types: Cigarettes     Quit date: 2013     Years since quittin.3     Smokeless tobacco: Never Used   Substance Use Topics     Alcohol use: Yes     Comment: Alcoholic Drinks/day: occasionally-rare 4 tomes a year     Current Outpatient Medications   Medication Sig Dispense Refill     aspirin (ASA) 81 MG chewable tablet TAKE 1 TABLET (81 MG) BY MOUTH DAILY 90 tablet 3     blood glucose (NO BRAND SPECIFIED) test  strip Use to test blood sugar 1 times daily. 100 each 11     blood glucose monitoring (ACCU-CHEK LILIANA PLUS) meter device kit NEEDS METER,STRIPS,LANCETS 1 kit 1     blood glucose monitoring (ACCU-CHEK MULTICLIX) lancets Use to test blood sugar 1 times daily. 102 each 11     carvedilol (COREG) 12.5 MG tablet Take 2 tablets (25 mg) by mouth 2 times daily (with meals) 180 tablet 3     citalopram (CELEXA) 20 MG tablet TAKE 1 TABLET (20 MG) BY MOUTH DAILY 90 tablet 3     clopidogrel (PLAVIX) 75 MG tablet Take 1 tablet (75 mg) by mouth daily 90 tablet 3     gabapentin (NEURONTIN) 300 MG capsule TAKE 2 CAPSULES BY MOUTH THREE TIMES DAILY 180 capsule 11     glipiZIDE (GLUCOTROL XL) 10 MG 24 hr tablet TAKE 1 TAB BY MOUTH ONCE DAILY 90 tablet 3     hydrochlorothiazide (HYDRODIURIL) 25 MG tablet TAKE 1 TABLET (25 MG) BY MOUTH DAILY 90 tablet 3     isosorbide mononitrate (IMDUR) 60 MG 24 hr tablet Take 1 tablet (60 mg) by mouth daily 90 tablet 3     lisinopril (ZESTRIL) 40 MG tablet TAKE 1 TAB BY MOUTH ONCE DAILY 90 tablet 3     meloxicam (MOBIC) 15 MG tablet TAKE 1 TAB BY MOUTH ONCE DAILY 90 tablet 3     metFORMIN (GLUCOPHAGE) 1000 MG tablet TAKE 1 TABLET BY MOUTH TWICE A DAY WITH MEALS 180 tablet 3     nitroGLYcerin (NITROSTAT) 0.4 MG sublingual tablet For chest pain place 1 tablet under the tongue every 5 minutes for 3 doses. If symptoms persist 5 minutes after 1st dose call 911. 25 tablet 3     omeprazole (PRILOSEC) 40 MG DR capsule Take 1 capsule (40 mg) by mouth daily with food 90 capsule 3     rosuvastatin (CRESTOR) 40 MG tablet Take 1 tablet (40 mg) by mouth daily 90 tablet 3     No Known Allergies      Past medical history, past surgical history, current medications and allergies reviewed and accurate to the best of my knowledge.        ROS:  Refer to HPI    BP (!) 150/86 (BP Location: Left arm, Patient Position: Sitting, Cuff Size: Adult Regular)   Pulse 72   Temp 97.3  F (36.3  C) (Tympanic)   Resp 12   Wt 111.8  kg (246 lb 8 oz)   BMI 34.51 kg/m      EXAM:  General Appearance: Well appearing male, appropriate appearance for age. No acute distress   Musculoskeletal:  Equal movement of bilateral upper extremities.  Equal movement of bilateral lower extremities. Limping gait.    Dermatological: Erythema and swelling noted to the anterior aspect of the left shin and ecchymosis and swelling noted to the left medial malleolus. Pedal pulses 2+ bilaterally.   Psychological: normal affect, alert, oriented, and pleasant.         Xray:  Results for orders placed or performed in visit on 12/14/21   XR Tibia & Fibula Left 2 Views     Status: None    Narrative    PROCEDURE:  XR TIBIA & FIBULA LT 2 VW    HISTORY: Patient has erythema and swelling of left anterior shin and  ecchymosis and swelling of medial ankle. Has dropped and ran into log  and eryn with left shin; Injury of left shin, initial encounter    COMPARISON:  None.    TECHNIQUE:  AP and lateral views of the left tibia and fibula were  obtained.    FINDINGS:  No fracture or dislocation is identified. The joint spaces  are preserved.        Impression    IMPRESSION: No acute fracture.      SHIRA BANEGAS MD         SYSTEM ID:  [id number]   XR Ankle Left G/E 3 Views     Status: None    Narrative    PROCEDURE:  XR ANKLE LEFT G/E 3 VIEWS    HISTORY: Patient has erythema and swelling of left anterior shin and  ecchymosis and swelling of medial ankle. Has dropped and ran into log  and eryn with left shin; Injury of left shin, initial encounter    COMPARISON:  None.    TECHNIQUE:  3 views of the left ankle were obtained.    FINDINGS:  There is bony spurring at the insertion of the Achilles  tendon and the calcaneus. No acute fractures of the distal tibia and  fibula talus or calcaneus are noted. There is bony irregularity  adjacent to the tips of the medial and lateral malleoli consistent  with old injuries.       Impression    IMPRESSION: No acute fracture.      SHIRA BANEGAS MD          SYSTEM ID:  [id number]

## 2021-12-15 DIAGNOSIS — M48.02 CERVICAL STENOSIS OF SPINAL CANAL: Primary | ICD-10-CM

## 2021-12-21 ENCOUNTER — OFFICE VISIT (OUTPATIENT)
Dept: ORTHOPEDICS | Facility: OTHER | Age: 46
End: 2021-12-21
Attending: FAMILY MEDICINE
Payer: COMMERCIAL

## 2021-12-21 ENCOUNTER — HOSPITAL ENCOUNTER (OUTPATIENT)
Dept: GENERAL RADIOLOGY | Facility: OTHER | Age: 46
End: 2021-12-21
Attending: STUDENT IN AN ORGANIZED HEALTH CARE EDUCATION/TRAINING PROGRAM
Payer: COMMERCIAL

## 2021-12-21 VITALS — SYSTOLIC BLOOD PRESSURE: 156 MMHG | HEART RATE: 68 BPM | DIASTOLIC BLOOD PRESSURE: 92 MMHG

## 2021-12-21 DIAGNOSIS — M54.12 CERVICAL RADICULOPATHY: ICD-10-CM

## 2021-12-21 DIAGNOSIS — M48.02 CERVICAL STENOSIS OF SPINAL CANAL: ICD-10-CM

## 2021-12-21 PROCEDURE — 99214 OFFICE O/P EST MOD 30 MIN: CPT | Performed by: STUDENT IN AN ORGANIZED HEALTH CARE EDUCATION/TRAINING PROGRAM

## 2021-12-21 PROCEDURE — G0463 HOSPITAL OUTPT CLINIC VISIT: HCPCS

## 2021-12-21 PROCEDURE — 72040 X-RAY EXAM NECK SPINE 2-3 VW: CPT

## 2021-12-21 NOTE — PROGRESS NOTES
Visit Date: 12/21/2021    Herb is a 46-year-old male who presents for evaluation of neck and right greater than left-sided arm dysfunction.  He has radiating pain in his neck that radiates into the right arm in a radiculopathy type pattern with numbness and tingling in the right upper extremity, worse on the left as well.  He has dealt with this with chiropractic care, over-the-counter pain medications and therapy, which has been worsening over the last 6-12 months.    PHYSICAL EXAMINATION:    GENERAL:  Well-appearing, well-nourished.  MUSCULOSKELETAL NEUROLOGIC:  He has 5/5 strength in bilateral deltoid, biceps, triceps, wrist extension, flexion, hand , hand intrinsics.  He has normal sensation to light touch throughout bilateral upper extremities.    Imaging available for review today includes an MRI of the cervical spine, which shows evidence of degenerative disk disease, worse at the level of C5 to C6, with right greater than left-sided neural foraminal narrowing and displacement of the right-sided spinal cord secondary to degenerative disk disease.  He had flexion, extension films of the cervical spine obtained in clinic today, which shows no gross abnormal motion of flexed or extended position, but does show the beginnings of anterior osteophyte formation over the C5 to C6 disk space.    ASSESSMENT AND PLAN:  Herb is a 46-year-old male with cervical myelopathy with radiculopathy.  Given the imaging, he is not a good candidate for cervical disk replacement given the anterior osteophyte formation.  Risks, benefits and alternatives were discussed with the patient.  He has elected to undergo C5 to C6 anterior cervical diskectomy and fusion which will be performed at [facility] once we can coordinate the schedule.  Of note, he is on blood thinning medication, which will be required to be held 1 week before and 1 week after surgery and is in the midst of dealing with the leg cellulitis and he will have  to be 6 weeks off antibiotics prior to surgery scheduling.    This clinic visit took 30-44 minutes.    Marcial Ram MD        D: 2021   T: 2021   MT: [id number]    Name:     BRODIE GILLIS  MRN:      [id number]        Account:    [id number]   :      1975           Visit Date: 2021     Document: [id number]

## 2021-12-21 NOTE — PROGRESS NOTES
Patient is here for consult on his cervical spine.  Gay Mckeon LPN .....................12/21/2021 10:09 AM

## 2021-12-22 ENCOUNTER — HOSPITAL ENCOUNTER (OUTPATIENT)
Facility: OTHER | Age: 46
End: 2021-12-22
Attending: STUDENT IN AN ORGANIZED HEALTH CARE EDUCATION/TRAINING PROGRAM | Admitting: STUDENT IN AN ORGANIZED HEALTH CARE EDUCATION/TRAINING PROGRAM
Payer: COMMERCIAL

## 2022-02-16 ENCOUNTER — TELEPHONE (OUTPATIENT)
Dept: ORTHOPEDICS | Facility: OTHER | Age: 47
End: 2022-02-16
Payer: COMMERCIAL

## 2022-02-16 DIAGNOSIS — Z98.1 S/P CERVICAL SPINAL FUSION: Primary | ICD-10-CM

## 2022-02-16 NOTE — TELEPHONE ENCOUNTER
Patient is having C5-6 Anterior Cervical disc Fusion DOS 3-, Needs OP PT to start the 4/7, 4/8, or 4/11

## 2022-02-25 ENCOUNTER — TELEPHONE (OUTPATIENT)
Dept: CARDIOLOGY | Facility: OTHER | Age: 47
End: 2022-02-25

## 2022-02-25 ENCOUNTER — OFFICE VISIT (OUTPATIENT)
Dept: FAMILY MEDICINE | Facility: OTHER | Age: 47
End: 2022-02-25
Attending: FAMILY MEDICINE
Payer: COMMERCIAL

## 2022-02-25 VITALS
HEART RATE: 72 BPM | RESPIRATION RATE: 18 BRPM | BODY MASS INDEX: 34.86 KG/M2 | DIASTOLIC BLOOD PRESSURE: 82 MMHG | WEIGHT: 249 LBS | TEMPERATURE: 97.9 F | SYSTOLIC BLOOD PRESSURE: 138 MMHG | OXYGEN SATURATION: 99 % | HEIGHT: 71 IN

## 2022-02-25 DIAGNOSIS — I10 ESSENTIAL HYPERTENSION: ICD-10-CM

## 2022-02-25 DIAGNOSIS — I25.118 CORONARY ARTERY DISEASE OF NATIVE ARTERY OF NATIVE HEART WITH STABLE ANGINA PECTORIS (H): ICD-10-CM

## 2022-02-25 DIAGNOSIS — E11.9 TYPE 2 DIABETES MELLITUS WITHOUT COMPLICATION, WITHOUT LONG-TERM CURRENT USE OF INSULIN (H): ICD-10-CM

## 2022-02-25 DIAGNOSIS — Z01.818 PREOP GENERAL PHYSICAL EXAM: ICD-10-CM

## 2022-02-25 DIAGNOSIS — Z01.818 PREOPERATIVE EXAMINATION: Primary | ICD-10-CM

## 2022-02-25 DIAGNOSIS — G47.33 OSA ON CPAP: ICD-10-CM

## 2022-02-25 LAB
ALBUMIN SERPL-MCNC: 4.8 G/DL (ref 3.5–5.7)
ALP SERPL-CCNC: 66 U/L (ref 34–104)
ALT SERPL W P-5'-P-CCNC: 18 U/L (ref 7–52)
ANION GAP SERPL CALCULATED.3IONS-SCNC: 8 MMOL/L (ref 3–14)
AST SERPL W P-5'-P-CCNC: 16 U/L (ref 13–39)
BILIRUB SERPL-MCNC: 0.8 MG/DL (ref 0.3–1)
BUN SERPL-MCNC: 14 MG/DL (ref 7–25)
CALCIUM SERPL-MCNC: 9.9 MG/DL (ref 8.6–10.3)
CHLORIDE BLD-SCNC: 98 MMOL/L (ref 98–107)
CHOLEST SERPL-MCNC: 211 MG/DL
CO2 SERPL-SCNC: 32 MMOL/L (ref 21–31)
CREAT SERPL-MCNC: 1.23 MG/DL (ref 0.7–1.3)
CREAT UR-MCNC: 89 MG/DL
FASTING STATUS PATIENT QL REPORTED: ABNORMAL
GFR SERPL CREATININE-BSD FRML MDRD: 72 ML/MIN/1.73M2
GLUCOSE BLD-MCNC: 251 MG/DL (ref 70–105)
HBA1C MFR BLD: 7.9 % (ref 4–6.2)
HDLC SERPL-MCNC: 58 MG/DL (ref 23–92)
HGB BLD-MCNC: 14.8 G/DL (ref 13.3–17.7)
LDLC SERPL CALC-MCNC: 109 MG/DL
MICROALBUMIN UR-MCNC: 87 MG/L
MICROALBUMIN/CREAT UR: 97.75 MG/G CR (ref 0–17)
NONHDLC SERPL-MCNC: 153 MG/DL
POTASSIUM BLD-SCNC: 4.6 MMOL/L (ref 3.5–5.1)
PROT SERPL-MCNC: 7.3 G/DL (ref 6.4–8.9)
SODIUM SERPL-SCNC: 138 MMOL/L (ref 134–144)
TRIGL SERPL-MCNC: 221 MG/DL

## 2022-02-25 PROCEDURE — G0463 HOSPITAL OUTPT CLINIC VISIT: HCPCS

## 2022-02-25 PROCEDURE — 93010 ELECTROCARDIOGRAM REPORT: CPT | Performed by: INTERNAL MEDICINE

## 2022-02-25 PROCEDURE — 80053 COMPREHEN METABOLIC PANEL: CPT | Mod: ZL | Performed by: FAMILY MEDICINE

## 2022-02-25 PROCEDURE — 83036 HEMOGLOBIN GLYCOSYLATED A1C: CPT | Mod: ZL | Performed by: FAMILY MEDICINE

## 2022-02-25 PROCEDURE — 36415 COLL VENOUS BLD VENIPUNCTURE: CPT | Mod: ZL | Performed by: FAMILY MEDICINE

## 2022-02-25 PROCEDURE — 99214 OFFICE O/P EST MOD 30 MIN: CPT | Performed by: FAMILY MEDICINE

## 2022-02-25 PROCEDURE — 80061 LIPID PANEL: CPT | Mod: ZL | Performed by: FAMILY MEDICINE

## 2022-02-25 PROCEDURE — 93005 ELECTROCARDIOGRAM TRACING: CPT | Performed by: FAMILY MEDICINE

## 2022-02-25 PROCEDURE — 82040 ASSAY OF SERUM ALBUMIN: CPT | Mod: ZL | Performed by: FAMILY MEDICINE

## 2022-02-25 PROCEDURE — 82043 UR ALBUMIN QUANTITATIVE: CPT | Mod: ZL | Performed by: FAMILY MEDICINE

## 2022-02-25 PROCEDURE — 85018 HEMOGLOBIN: CPT | Mod: ZL | Performed by: FAMILY MEDICINE

## 2022-02-25 ASSESSMENT — PAIN SCALES - GENERAL: PAINLEVEL: SEVERE PAIN (6)

## 2022-02-25 NOTE — PROGRESS NOTES
[facility]  [address]  [place] [address]  Phone: [phone]  Fax: [phone]  Primary Provider: Denise Reyes  Pre-op Performing Provider: DENISE REYES      PREOPERATIVE EVALUATION:  Today's date: 2/25/2022    Herb Figueroa is a 46 year old male who presents for a preoperative evaluation.    Surgical Information:  Surgery/Procedure: C5-C6 anterior cervical disc fusion  Surgery Location: [facility]  Surgeon: Dr. Ram  Surgery Date: 3-  Where patient plans to recover: At home with family  Fax number for surgical facility: Note does not need to be faxed, will be available electronically in Epic.    Type of Anesthesia Anticipated: Choice    Assessment & Plan     The proposed surgical procedure is considered INTERMEDIATE risk.    Preoperative examination  Patient is not optimized for his upcoming procedure and not cleared from a cardiac standpoint.  He was scheduled with cardiology earlier this week and did not make it to his appointment.  We will work on getting him back into cardiology's schedule to review his continuing chest pain and history of heart disease.  - Hemoglobin; Future  - Hemoglobin    SCOTT on CPAP  Continue CPAP    Coronary artery disease of native artery of native heart with stable angina pectoris (H)  Follow-up with cardiology    Type 2 diabetes mellitus without complication, without long-term current use of insulin (H)  We will update yearly labs.  He will stop glipizide the morning of his procedure.  - Lipid Panel; Future  - Comprehensive Metabolic Panel; Future  - Hemoglobin A1c; Future  - Albumin Random Urine Quantitative with Creat Ratio; Future  - Lipid Panel  - Comprehensive Metabolic Panel  - Hemoglobin A1c  - Albumin Random Urine Quantitative with Creat Ratio    Essential hypertension  Controlled, continue current regimen           Risks and Recommendations:  The patient has the following additional risks and recommendations for  perioperative complications:  Cardiovascular:   - Cardiology consulted     Medication Instructions:  Patient is to take all scheduled medications on the day of surgery EXCEPT for modifications listed below:   - clopidrogel (Plavix), prasugrel (Effient), ticagrelor (Brilinta): No contraindication to stopping Plavix, HOLD 5-7 days before surgery.    - sulfonylurea (e.g. glyburide, glipizide): HOLD day of surgery    RECOMMENDATION:  Patient referred to cardiology for evaluation before surgery. Surgery approval pending completion of consultation.      Subjective     HPI related to upcoming procedure: He has been seen by Dr. Ram for cervical radiculopathy and myelopathy.  He is planning on undergoing elective anterior cervical discectomy and fusion.    He has a significant cardiac history including recent cardiac catheterization on October 29, 2021 where he was found to have stable disease.  His stent in his LAD had 20% blockage, ramus had 80% blockage without stenting and the rest of his blockages were less than 70%.  It was recommended at that time he try medical management.  He was last seen by cardiology in November where Crestor and Imdur were increased.  He also had 2 stents placed in September 2020 and was recommended to continue Plavix for 1 year.  He does not recall the last time he used nitroglycerin.  He continues to have left upper chest wall pain that he thinks is muscular however it seems to bother him most when he is active, busy working or with anxiety.    He has a history of type 2 diabetes, his last hemoglobin A1c was in November 2020 and was 6.4%.  He occasionally checks his blood sugars at home and they are generally in the low to mid 100s.  No hypoglycemia.    He has a history of sleep apnea but is not consistent with using his CPAP.  He has difficulty arranging follow-up with his DME provider    Preop Questions 2/25/2022   1. Have you ever had a heart attack or stroke? No   2. Have you ever had  surgery on your heart or blood vessels, such as a stent placement, a coronary artery bypass, or surgery on an artery in your head, neck, heart, or legs? YES - stent x2 2020   3. Do you have chest pain with activity? YES - stable angina   4. Do you have a history of  heart failure? No   5. Do you currently have a cold, bronchitis or symptoms of other infection? No   6. Do you have a cough, shortness of breath, or wheezing? No   7. Do you or anyone in your family have previous history of blood clots? YES - mom carotid artery stenosis   8. Do you or does anyone in your family have a serious bleeding problem such as prolonged bleeding following surgeries or cuts? No   9. Have you ever had problems with anemia or been told to take iron pills? No   10. Have you had any abnormal blood loss such as black, tarry or bloody stools? YES - occasional rectal bleeding from hemrrhoids   11. Have you ever had a blood transfusion? No   12. Are you willing to have a blood transfusion if it is medically needed before, during, or after your surgery? Yes   13. Have you or any of your relatives ever had problems with anesthesia? No   14. Do you have sleep apnea, excessive snoring or daytime drowsiness? YES - uses CPAP   14a. Do you have a CPAP machine? Yes   15. Do you have any artifical heart valves or other implanted medical devices like a pacemaker, defibrillator, or continuous glucose monitor? No   16. Do you have artificial joints? No   17. Are you allergic to latex? No       Health Care Directive:  Patient does not have a Health Care Directive or Living Will: Discussed advance care planning with patient; however, patient declined at this time.    Preoperative Review of :   reviewed - no record of controlled substances prescribed.    Review of Systems  CONSTITUTIONAL: NEGATIVE for fever, chills, change in weight  INTEGUMENTARY/SKIN: NEGATIVE for worrisome rashes, moles or lesions  EYES: NEGATIVE for vision changes or  irritation  ENT/MOUTH: NEGATIVE for ear, mouth and throat problems  RESP: NEGATIVE for significant cough or SOB  CV: Continues to have persistent left upper chest wall pain as described above.  GI: NEGATIVE for nausea, abdominal pain, heartburn, or change in bowel habits  : NEGATIVE for frequency, dysuria, or hematuria  MUSCULOSKELETAL: NEGATIVE for significant arthralgias or myalgia  NEURO: NEGATIVE for weakness, dizziness or paresthesias  ENDOCRINE: NEGATIVE for temperature intolerance, skin/hair changes  HEME: NEGATIVE for bleeding problems  PSYCHIATRIC: NEGATIVE for changes in mood or affect    Patient Active Problem List    Diagnosis Date Noted     Status post coronary angiogram 10/29/2021     Priority: Medium     Coronary artery disease of native artery of native heart with stable angina pectoris (H) 09/21/2021     Priority: Medium     Stable angina (H) 09/21/2021     Priority: Medium     Fatigue, unspecified type 09/21/2021     Priority: Medium     Insomnia, unspecified type 09/21/2021     Priority: Medium     S/P cardiac cath at [facility] on 9/8/2020 09/24/2020     Priority: Medium     History of coronary artery stent placement x2 to the LAD on 9/8/2020 at [facility] 09/24/2020     Priority: Medium     Abnormal stress test on 8/24/20 and 9/27/2021 08/27/2020     Priority: Medium     Coronary artery disease involving native coronary artery of native heart without angina pectoris 08/27/2020     Priority: Medium     Mixed hyperlipidemia 08/27/2020     Priority: Medium     Gastroesophageal reflux disease without esophagitis 08/27/2020     Priority: Medium     Mild asthma  08/27/2020     Priority: Medium     History of tobacco abuse quitting in approximately 2013 at a half a pack a day 08/27/2020     Priority: Medium     Pain of both wrist joints 02/14/2019     Priority: Medium     Type 2 diabetes mellitus without complication, without long-term current use of insulin (H) 04/20/2018     Priority: Medium      Morbid obesity (H) 04/20/2018     Priority: Medium     Pain in thoracic spine 01/17/2018     Priority: Medium     Degeneration of thoracic or thoracolumbar intervertebral disc 01/17/2018     Priority: Medium     Overview:   Thoracic degenerative spine disease/juvenile disc disease-Dr. Samir Acosta       Gastritis 01/17/2018     Priority: Medium     Overview:   with dysphagia       Scoliosis (and kyphoscoliosis), idiopathic 01/17/2018     Priority: Medium     Overview:   Dr.Scott Acosta  Replacing diagnoses that were inactivated after the 10/1/2021 regulatory import.       SCOTT on CPAP 10/15/2015     Priority: Medium     Overview:   Moderate, sleep study        Suicidal ideation 07/30/2013     Priority: Medium     Depression, major 08/05/2010     Priority: Medium     Carpal tunnel syndrome, right 03/08/2010     Priority: Medium     Essential hypertension 01/27/2010     Priority: Medium     Gout 10/01/2005     Priority: Medium     Overview:   left great toe        Past Medical History:   Diagnosis Date     Encounter for screening for cardiovascular disorders     02-11, Negative stress test     Essential (primary) hypertension     No Comments Provided     Gastro-esophageal reflux disease without esophagitis     No Comments Provided     Major depressive disorder, single episode     After the death of his daughter in MVA     Mild intermittent asthma, uncomplicated     No Comments Provided     Nicotine dependence, uncomplicated     age [age], 1-2 packs per day, Quit Aug 2005     Suicidal ideations     2013,Hospitalized in [place]     Past Surgical History:   Procedure Laterality Date     CV CORONARY ANGIOGRAM N/A 10/29/2021    Procedure: CV CORONARY ANGIOGRAM;  Surgeon: Juan Hdz MD;  Location:  [facility]     LAPAROSCOPIC CHOLECYSTECTOMY      11-05,Dr. Dunne     TYMPANOSTOMY, LOCAL/TOPICAL ANESTHESIA      No Comments Provided     Current Outpatient Medications   Medication Sig Dispense  Refill     aspirin (ASA) 81 MG chewable tablet TAKE 1 TABLET (81 MG) BY MOUTH DAILY 90 tablet 3     blood glucose (NO BRAND SPECIFIED) test strip Use to test blood sugar 1 times daily. 100 each 11     blood glucose monitoring (ACCU-CHEK LILIANA PLUS) meter device kit NEEDS METER,STRIPS,LANCETS 1 kit 1     blood glucose monitoring (ACCU-CHEK MULTICLIX) lancets Use to test blood sugar 1 times daily. 102 each 11     carvedilol (COREG) 12.5 MG tablet Take 2 tablets (25 mg) by mouth 2 times daily (with meals) 180 tablet 3     citalopram (CELEXA) 20 MG tablet TAKE 1 TABLET (20 MG) BY MOUTH DAILY 90 tablet 3     clopidogrel (PLAVIX) 75 MG tablet Take 1 tablet (75 mg) by mouth daily 90 tablet 3     gabapentin (NEURONTIN) 300 MG capsule TAKE 2 CAPSULES BY MOUTH THREE TIMES DAILY 180 capsule 11     glipiZIDE (GLUCOTROL XL) 10 MG 24 hr tablet TAKE 1 TAB BY MOUTH ONCE DAILY 90 tablet 3     hydrochlorothiazide (HYDRODIURIL) 25 MG tablet TAKE 1 TABLET (25 MG) BY MOUTH DAILY 90 tablet 3     isosorbide mononitrate (IMDUR) 60 MG 24 hr tablet Take 1 tablet (60 mg) by mouth daily 90 tablet 3     lisinopril (ZESTRIL) 40 MG tablet TAKE 1 TAB BY MOUTH ONCE DAILY 90 tablet 3     meloxicam (MOBIC) 15 MG tablet TAKE 1 TAB BY MOUTH ONCE DAILY 90 tablet 3     metFORMIN (GLUCOPHAGE) 1000 MG tablet TAKE 1 TABLET BY MOUTH TWICE A DAY WITH MEALS 180 tablet 3     nitroGLYcerin (NITROSTAT) 0.4 MG sublingual tablet For chest pain place 1 tablet under the tongue every 5 minutes for 3 doses. If symptoms persist 5 minutes after 1st dose call 911. 25 tablet 3     omeprazole (PRILOSEC) 40 MG DR capsule TAKE 1 CAPSULE BY MOUTH ONCE DAILY WITH FOOD 90 capsule 3     rosuvastatin (CRESTOR) 40 MG tablet Take 1 tablet (40 mg) by mouth daily 90 tablet 3       No Known Allergies     Social History     Tobacco Use     Smoking status: Former Smoker     Packs/day: 0.00     Years: 20.00     Pack years: 0.00     Types: Cigarettes     Quit date: 8/1/2013     Years since  "quittin.5     Smokeless tobacco: Never Used   Substance Use Topics     Alcohol use: Yes     Comment: Alcoholic Drinks/day: occasionally-rare 4 tomes a year         Objective     /82   Pulse 72   Temp 97.9  F (36.6  C)   Resp 18   Ht 1.8 m (5' 10.87\")   Wt 112.9 kg (249 lb)   SpO2 99%   BMI 34.86 kg/m      Physical Exam    GENERAL APPEARANCE: healthy, alert and no distress     EYES: EOMI,  PERRL     HENT: ear canals and TM's normal and nose and mouth without ulcers or lesions     NECK: no adenopathy, no asymmetry, masses, or scars and thyroid normal to palpation     RESP: lungs clear to auscultation - no rales, rhonchi or wheezes     CV: regular rates and rhythm, normal S1 S2, no S3 or S4 and no murmur, click or rub     ABDOMEN:  soft, nontender, no HSM or masses and bowel sounds normal     MS: extremities normal- no gross deformities noted, no evidence of inflammation in joints, FROM in all extremities.     SKIN: no suspicious lesions or rashes     NEURO: Normal strength and tone, sensory exam grossly normal, mentation intact and speech normal     PSYCH: mentation appears normal. and affect normal/bright     LYMPHATICS: No cervical adenopathy      Diagnostics:  Labs pending at this time.  Results will be reviewed when available.   EKG shows sinus rhythm without any acute ischemic changes.    Revised Cardiac Risk Index (RCRI):  The patient has the following serious cardiovascular risks for perioperative complications:   - Coronary Artery Disease (MI, positive stress test, angina, Qs on EKG) = 1 point     RCRI Interpretation: 1 point: Class II (low risk - 0.9% complication rate)      Signed Electronically by: Sg Szymanski  Copy of this evaluation report is provided to requesting physician.      "

## 2022-02-25 NOTE — TELEPHONE ENCOUNTER
Providers response:    Pt was scheduled but was a no show just a week ago. He may need to reschedule surgery if we have no openings. Otherwise, if need be, will have to use one of these spots. Thanks!     Dr. Angel Miranda, SAMINA  ....................  2/25/2022   1:53 PM

## 2022-02-25 NOTE — LETTER
My Asthma Action Plan    Name: Herb Figueroa   YOB: 1975  Date: 2/25/2022   My doctor: Sg Szymanski   My clinic: [facility]        My Rescue Medicine:   Albuterol inhaler (Proair/Ventolin/Proventil HFA)  2-4 puffs EVERY 4 HOURS as needed. Use a spacer if recommended by your provider.   My Asthma Severity:   Mild Persistent  Know your asthma triggers:              GREEN ZONE   Good Control    I feel good    No cough or wheeze    Can work, sleep and play without asthma symptoms       Take your asthma control medicine every day.     1. If exercise triggers your asthma, take your rescue medication    15 minutes before exercise or sports, and    During exercise if you have asthma symptoms  2. Spacer to use with inhaler: If you have a spacer, make sure to use it with your inhaler             YELLOW ZONE Getting Worse  I have ANY of these:    I do not feel good    Cough or wheeze    Chest feels tight    Wake up at night   1. Keep taking your Green Zone medications  2. Start taking your rescue medicine:    every 20 minutes for up to 1 hour. Then every 4 hours for 24-48 hours.  3. If you stay in the Yellow Zone for more than 12-24 hours, contact your doctor.  4. If you do not return to the Green Zone in 12-24 hours or you get worse, start taking your oral steroid medicine if prescribed by your provider.           RED ZONE Medical Alert - Get Help  I have ANY of these:    I feel awful    Medicine is not helping    Breathing getting harder    Trouble walking or talking    Nose opens wide to breathe       1. Take your rescue medicine NOW  2. If your provider has prescribed an oral steroid medicine, start taking it NOW  3. Call your doctor NOW  4. If you are still in the Red Zone after 20 minutes and you have not reached your doctor:    Take your rescue medicine again and    Call 911 or go to the emergency room right away    See your regular doctor within 2 weeks of an Emergency Room or  Urgent Care visit for follow-up treatment.          Annual Reminders:  Meet with Asthma Educator,  Flu Shot in the Fall, consider Pneumonia Vaccination for patients with asthma (aged 19 and older).    Pharmacy:    [facility] - [place], [place] - [address] [id number] - [place], [place] - [address]    Electronically signed by Sg Szymanski   Date: 02/25/22                    Asthma Triggers  How To Control Things That Make Your Asthma Worse    Triggers are things that make your asthma worse.  Look at the list below to help you find your triggers and   what you can do about them. You can help prevent asthma flare-ups by staying away from your triggers.      Trigger                                                          What you can do   Cigarette Smoke  Tobacco smoke can make asthma worse. Do not allow smoking in your home, car or around you.  Be sure no one smokes at a child s day care or school.  If you smoke, ask your health care provider for ways to help you quit.  Ask family members to quit too.  Ask your health care provider for a referral to Quit Plan to help you quit smoking, or call 2-791-377-PLAN.     Colds, Flu, Bronchitis  These are common triggers of asthma. Wash your hands often.  Don t touch your eyes, nose or mouth.  Get a flu shot every year.     Dust Mites  These are tiny bugs that live in cloth or carpet. They are too small to see. Wash sheets and blankets in hot water every week.   Encase pillows and mattress in dust mite proof covers.  Avoid having carpet if you can. If you have carpet, vacuum weekly.   Use a dust mask and HEPA vacuum.   Pollen and Outdoor Mold  Some people are allergic to trees, grass, or weed pollen, or molds. Try to keep your windows closed.  Limit time out doors when pollen count is high.   Ask you health care provider about taking medicine during allergy season.     Animal Dander  Some people are allergic to skin flakes,  urine or saliva from pets with fur or feathers. Keep pets with fur or feathers out of your home.    If you can t keep the pet outdoors, then keep the pet out of your bedroom.  Keep the bedroom door closed.  Keep pets off cloth furniture and away from stuffed toys.     Mice, Rats, and Cockroaches  Some people are allergic to the waste from these pests.   Cover food and garbage.  Clean up spills and food crumbs.  Store grease in the refrigerator.   Keep food out of the bedroom.   Indoor Mold  This can be a trigger if your home has high moisture. Fix leaking faucets, pipes, or other sources of water.   Clean moldy surfaces.  Dehumidify basement if it is damp and smelly.   Smoke, Strong Odors, and Sprays  These can reduce air quality. Stay away from strong odors and sprays, such as perfume, powder, hair spray, paints, smoke incense, paint, cleaning products, candles and new carpet.   Exercise or Sports  Some people with asthma have this trigger. Be active!  Ask your doctor about taking medicine before sports or exercise to prevent symptoms.    Warm up for 5-10 minutes before and after sports or exercise.     Other Triggers of Asthma  Cold air:  Cover your nose and mouth with a scarf.  Sometimes laughing or crying can be a trigger.  Some medicines and food can trigger asthma.

## 2022-02-25 NOTE — TELEPHONE ENCOUNTER
Patient needs to be cleared by his cardiologist for surgery with selina Bartholomew, 03/24, C5-6 anterior disc fusion per Dr. Szymanski who preformed his pre-op appointment.  Only available appointments with providers prior to surgery are Hospital Discharge time slots.  We need approval to schedule.  Patient was scheduled 02/22/2022 but missed the appointment due to weather.  Peg Oliveros on 2/25/2022 at 9:55 AM

## 2022-02-25 NOTE — NURSING NOTE
Patient presents today for pre op exam.    Medication Reconciliation Complete    Janeth Garcia LPN  2/25/2022 9:05 AM

## 2022-02-25 NOTE — Clinical Note
William,    I am sending Ben back to you for preop clearance.  He was scheduled with you earlier this week and no showed.  He continues to have left sided chest pain.  I saw him for his preop today and did not clear him.    Also, curious if he should continue on plavix indefinitely.  Has been over a year since his last stent (September 2020)

## 2022-02-25 NOTE — PATIENT INSTRUCTIONS
Preparing for Your Surgery  Getting started  A nurse will call you to review your health history and instructions. They will give you an arrival time based on your scheduled surgery time. Please be ready to share:    Your doctor's clinic name and phone number    Your medical, surgical and anesthesia history    A list of allergies and sensitivities    A list of medicines, including herbal treatments and over-the-counter drugs    Whether the patient has a legal guardian (ask how to send us the papers in advance)  Please tell us if you're pregnant--or if there's any chance you might be pregnant. Some surgeries may injure a fetus (unborn baby), so they require a pregnancy test. Surgeries that are safe for a fetus don't always need a test, and you can choose whether to have one.   If you have a child who's having surgery, please ask for a copy of Preparing for Your Child's Surgery.    Preparing for surgery    Within 30 days of surgery: Have a pre-op exam (sometimes called an H&P, or History and Physical). This can be done at a clinic or pre-operative center.  ? If you're having a , you may not need this exam. Talk to your care team.    At your pre-op exam, talk to your care team about all medicines you take. If you need to stop any medicines before surgery, ask when to start taking them again.  ? We do this for your safety. Many medicines can make you bleed too much during surgery. Some change how well surgery (anesthesia) drugs work.    Call your insurance company to let them know you're having surgery. (If you don't have insurance, call [phone].)    Call your clinic if there's any change in your health. This includes signs of a cold or flu (sore throat, runny nose, cough, rash, fever). It also includes a scrape or scratch near the surgery site.    If you have questions on the day of surgery, call your hospital or surgery center.  COVID testing  You may need to be tested for COVID-19 before having  surgery. If so, your surgical team will give you instructions for scheduling this test, separate from your preoperative history and physical.  Eating and drinking guidelines  For your safety: Unless your surgeon tells you otherwise, follow the guidelines below.    Eat and drink as usual until 8 hours before surgery. After that, no food or milk.    Drink clear liquids until 2 hours before surgery. These are liquids you can see through, like water, Gatorade and Propel Water. You may also have black coffee and tea (no cream or milk).    Nothing by mouth within 2 hours of surgery. This includes gum, candy and breath mints.    If you drink alcohol: Stop drinking it the night before surgery.    If your care team tells you to take medicine on the morning of surgery, it's okay to take it with a sip of water.  Preventing infection    Shower or bathe the night before and morning of your surgery. Follow the instructions your clinic gave you. (If no instructions, use regular soap.)    Don't shave or clip hair near your surgery site. We'll remove the hair if needed.    Don't smoke or vape the morning of surgery. You may chew nicotine gum up to 2 hours before surgery. A nicotine patch is okay.  ? Note: Some surgeries require you to completely quit smoking and nicotine. Check with your surgeon.    Your care team will make every effort to keep you safe from infection. We will:  ? Clean our hands often with soap and water (or an alcohol-based hand rub).  ? Clean the skin at your surgery site with a special soap that kills germs.  ? Give you a special gown to keep you warm. (Cold raises the risk of infection.)  ? Wear special hair covers, masks, gowns and gloves during surgery.  ? Give antibiotic medicine, if prescribed. Not all surgeries need antibiotics.  What to bring on the day of surgery    Photo ID and insurance card    Copy of your health care directive, if you have one    Glasses and hearing aides (bring cases)  ? You can't  wear contacts during surgery    Inhaler and eye drops, if you use them (tell us about these when you arrive)    CPAP machine or breathing device, if you use them    A few personal items, if spending the night    If you have . . .  ? A pacemaker, ICD (cardiac defibrillator) or other implant: Bring the ID card.  ? An implanted stimulator: Bring the remote control.  ? A legal guardian: Bring a copy of the certified (court-stamped) guardianship papers.  Please remove any jewelry, including body piercings. Leave jewelry and other valuables at home.  If you're going home the day of surgery    You must have a responsible adult drive you home. They should stay with you overnight as well.    If you don't have someone to stay with you, and you aren't safe to go home alone, we may keep you overnight. Insurance often won't pay for this.  After surgery  If it's hard to control your pain or you need more pain medicine, please call your surgeon's office.  Questions?   If you have any questions for your care team, list them here: _________________________________________________________________________________________________________________________________________________________________________ ____________________________________ ____________________________________ ____________________________________  For informational purposes only. Not to replace the advice of your health care provider. Copyright   2003, 2019 [facility]. All rights reserved. Clinically reviewed by Sia Sellers MD. [employer] [id number] - REV 07/21.

## 2022-02-27 LAB
ATRIAL RATE - MUSE: 66 BPM
DIASTOLIC BLOOD PRESSURE - MUSE: NORMAL MMHG
INTERPRETATION ECG - MUSE: NORMAL
P AXIS - MUSE: 54 DEGREES
PR INTERVAL - MUSE: 194 MS
QRS DURATION - MUSE: 112 MS
QT - MUSE: 392 MS
QTC - MUSE: 410 MS
R AXIS - MUSE: 6 DEGREES
SYSTOLIC BLOOD PRESSURE - MUSE: NORMAL MMHG
T AXIS - MUSE: 27 DEGREES
VENTRICULAR RATE- MUSE: 66 BPM

## 2022-02-28 NOTE — TELEPHONE ENCOUNTER
[facility] in scheduling notified of the below and will call patient to make cardiology clearance appointment per Sg Ortiz, DO recommendations below.  Em Haywood RN......February 28, 2022...9:25 AM

## 2022-03-02 ENCOUNTER — TELEPHONE (OUTPATIENT)
Dept: CARDIOLOGY | Facility: OTHER | Age: 47
End: 2022-03-02

## 2022-03-02 ENCOUNTER — OFFICE VISIT (OUTPATIENT)
Dept: CARDIOLOGY | Facility: OTHER | Age: 47
End: 2022-03-02
Attending: NURSE PRACTITIONER
Payer: COMMERCIAL

## 2022-03-02 VITALS
HEIGHT: 71 IN | TEMPERATURE: 97.3 F | OXYGEN SATURATION: 98 % | WEIGHT: 253.2 LBS | DIASTOLIC BLOOD PRESSURE: 110 MMHG | SYSTOLIC BLOOD PRESSURE: 170 MMHG | HEART RATE: 68 BPM | BODY MASS INDEX: 35.45 KG/M2

## 2022-03-02 DIAGNOSIS — R06.09 DOE (DYSPNEA ON EXERTION): ICD-10-CM

## 2022-03-02 DIAGNOSIS — Z98.890 S/P CARDIAC CATH: ICD-10-CM

## 2022-03-02 DIAGNOSIS — I25.119 CORONARY ARTERY DISEASE INVOLVING NATIVE CORONARY ARTERY OF NATIVE HEART WITH ANGINA PECTORIS (H): ICD-10-CM

## 2022-03-02 DIAGNOSIS — Z95.5 STATUS POST INSERTION OF DRUG-ELUTING STENT INTO LEFT ANTERIOR DESCENDING (LAD) ARTERY: ICD-10-CM

## 2022-03-02 DIAGNOSIS — G47.33 OSA (OBSTRUCTIVE SLEEP APNEA): ICD-10-CM

## 2022-03-02 DIAGNOSIS — E11.9 TYPE 2 DIABETES MELLITUS WITHOUT COMPLICATION, WITHOUT LONG-TERM CURRENT USE OF INSULIN (H): ICD-10-CM

## 2022-03-02 DIAGNOSIS — G47.33 OSA ON CPAP: ICD-10-CM

## 2022-03-02 DIAGNOSIS — Z01.810 PRE-OPERATIVE CARDIOVASCULAR EXAMINATION: Primary | ICD-10-CM

## 2022-03-02 DIAGNOSIS — E78.2 MIXED HYPERLIPIDEMIA: ICD-10-CM

## 2022-03-02 DIAGNOSIS — R07.9 CHEST PAIN ON EXERTION: ICD-10-CM

## 2022-03-02 DIAGNOSIS — I10 ESSENTIAL HYPERTENSION: ICD-10-CM

## 2022-03-02 DIAGNOSIS — E78.5 HYPERLIPIDEMIA LDL GOAL <70: ICD-10-CM

## 2022-03-02 PROCEDURE — 93005 ELECTROCARDIOGRAM TRACING: CPT | Performed by: NURSE PRACTITIONER

## 2022-03-02 PROCEDURE — 99417 PROLNG OP E/M EACH 15 MIN: CPT | Performed by: NURSE PRACTITIONER

## 2022-03-02 PROCEDURE — 93010 ELECTROCARDIOGRAM REPORT: CPT | Performed by: INTERNAL MEDICINE

## 2022-03-02 PROCEDURE — 99215 OFFICE O/P EST HI 40 MIN: CPT | Performed by: NURSE PRACTITIONER

## 2022-03-02 PROCEDURE — G0463 HOSPITAL OUTPT CLINIC VISIT: HCPCS | Performed by: NURSE PRACTITIONER

## 2022-03-02 RX ORDER — AMLODIPINE BESYLATE 5 MG/1
5 TABLET ORAL DAILY
Qty: 90 TABLET | Refills: 1 | Status: SHIPPED | OUTPATIENT
Start: 2022-03-02 | End: 2022-09-12

## 2022-03-02 ASSESSMENT — PAIN SCALES - GENERAL: PAINLEVEL: SEVERE PAIN (7)

## 2022-03-02 ASSESSMENT — ANXIETY QUESTIONNAIRES
3. WORRYING TOO MUCH ABOUT DIFFERENT THINGS: SEVERAL DAYS
IF YOU CHECKED OFF ANY PROBLEMS ON THIS QUESTIONNAIRE, HOW DIFFICULT HAVE THESE PROBLEMS MADE IT FOR YOU TO DO YOUR WORK, TAKE CARE OF THINGS AT HOME, OR GET ALONG WITH OTHER PEOPLE: SOMEWHAT DIFFICULT
6. BECOMING EASILY ANNOYED OR IRRITABLE: NEARLY EVERY DAY
5. BEING SO RESTLESS THAT IT IS HARD TO SIT STILL: MORE THAN HALF THE DAYS
GAD7 TOTAL SCORE: 14
7. FEELING AFRAID AS IF SOMETHING AWFUL MIGHT HAPPEN: MORE THAN HALF THE DAYS
2. NOT BEING ABLE TO STOP OR CONTROL WORRYING: MORE THAN HALF THE DAYS
1. FEELING NERVOUS, ANXIOUS, OR ON EDGE: MORE THAN HALF THE DAYS

## 2022-03-02 ASSESSMENT — PATIENT HEALTH QUESTIONNAIRE - PHQ9
SUM OF ALL RESPONSES TO PHQ QUESTIONS 1-9: 18
5. POOR APPETITE OR OVEREATING: MORE THAN HALF THE DAYS

## 2022-03-02 NOTE — PROGRESS NOTES
[facility] HEART CARE   CARDIOLOGY PROGRESS NOTE    Herb Figueroa   [id number] [address] [address]    Sg Szymanski     Chief Complaint   Patient presents with     Heart Problem     Follow up        HPI:   Mr. Figueroa is a 46 year old male who presents for cardiology preoperative optimization. Patient was last seen by Dr. Ortiz on 11/23/21. Patient has a history of CAD, s/p cardiac cath at [facility] on 9/8/2020 resulting in x2 stents to oLAD. Repeat cath at [facility] on 10/29/21 revealing stable disease without further coronary intervention, he did have 80% ramus lesion felt unamenable to stenting. Additional PMH includes obesity, hyperlipidemia, hypertension, DM 2, SCOTT, GERD, mild intermittent asthma, history of tobacco abuse (quit in 2013) and vitamin D deficiency.    Last cardiac cath on 10/29/2021 at [facility] revealing 20% in-stent stenosis of the LAD, 40% lesion in native vessel, 80% ramus lesion not amendable to stenting, and 50% circumflex, RCA and left main patent. At his last visit on 11/23/21 patient reported ongoing angina. Recommended continued medical management. His Imdur was increased from 30 to 60 mg daily. His statin was also optimized with increase of his rosuvastatin to 40 mg daily.  He has continued on aspirin 81 mg daily with history of past coronary stenting, continues on Plavix 75 mg daily and beta-blocker carvedilol 25 mg twice daily.    Today patient reports left chest pressure associated to exertion radiation into left axilla with heaviness in left arm associated. He has also noted progression of exertional dyspnea and occasional episodes of hot and cold sweats. No palpitations, no lightheadedness or syncope.  He has not noted any increased edema. Reports following his last visit the anginal symptoms improved with increase in Imdur to 60 mg daily and over past few months symptoms have been returning. No recent medication adjustments.       PAST MEDICAL HISTORY:   Past Medical History:    Diagnosis Date     Encounter for screening for cardiovascular disorders     , Negative stress test     Essential (primary) hypertension     No Comments Provided     Gastro-esophageal reflux disease without esophagitis     No Comments Provided     Major depressive disorder, single episode     After the death of his daughter in MVA     Mild intermittent asthma, uncomplicated     No Comments Provided     Nicotine dependence, uncomplicated     age [age], 1-2 packs per day, Quit Aug 2005     Suicidal ideations     ,Hospitalized in [place]          FAMILY HISTORY:   No family history on file.       PAST SURGICAL HISTORY:   Past Surgical History:   Procedure Laterality Date     CV CORONARY ANGIOGRAM N/A 10/29/2021    Procedure: CV CORONARY ANGIOGRAM;  Surgeon: Juan Hdz MD;  Location:  HEART CARDIAC CATH LAB     LAPAROSCOPIC CHOLECYSTECTOMY      ,Dr. Dunne     TYMPANOSTOMY, LOCAL/TOPICAL ANESTHESIA      No Comments Provided          SOCIAL HISTORY:   Social History     Socioeconomic History     Marital status: Legally      Spouse name: Not on file     Number of children: Not on file     Years of education: Not on file     Highest education level: Not on file   Occupational History     Not on file   Tobacco Use     Smoking status: Former Smoker     Packs/day: 0.00     Years: 20.00     Pack years: 0.00     Types: Cigarettes     Quit date: 2013     Years since quittin.5     Smokeless tobacco: Never Used   Vaping Use     Vaping Use: Never used   Substance and Sexual Activity     Alcohol use: Yes     Comment: Alcoholic Drinks/day: occasionally-rare 4 tomes a year     Drug use: Not Currently     Sexual activity: Not Currently   Other Topics Concern     Parent/sibling w/ CABG, MI or angioplasty before 65F 55M? Not Asked   Social History Narrative    .  Two sons.      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      Works as a  at [employer].      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     Social Determinants of Health     Financial Resource Strain: Not on file   Food Insecurity: Not on file   Transportation Needs: Not on file   Physical Activity: Not on file   Stress: Not on file   Social Connections: Not on file   Intimate Partner Violence: Not on file   Housing Stability: Not on file          CURRENT MEDICATIONS:   Current Outpatient Medications   Medication     aspirin (ASA) 81 MG chewable tablet     blood glucose (NO BRAND SPECIFIED) test strip     blood glucose monitoring (ACCU-CHEK LILIANA PLUS) meter device kit     blood glucose monitoring (ACCU-CHEK MULTICLIX) lancets     carvedilol (COREG) 12.5 MG tablet     citalopram (CELEXA) 20 MG tablet     clopidogrel (PLAVIX) 75 MG tablet     gabapentin (NEURONTIN) 300 MG capsule     glipiZIDE (GLUCOTROL XL) 10 MG 24 hr tablet     hydrochlorothiazide (HYDRODIURIL) 25 MG tablet     isosorbide mononitrate (IMDUR) 60 MG 24 hr tablet     lisinopril (ZESTRIL) 40 MG tablet     meloxicam (MOBIC) 15 MG tablet     metFORMIN (GLUCOPHAGE) 1000 MG tablet     nitroGLYcerin (NITROSTAT) 0.4 MG sublingual tablet     omeprazole (PRILOSEC) 40 MG DR capsule     rosuvastatin (CRESTOR) 40 MG tablet     No current facility-administered medications for this visit.         ALLERGIES:   No Known Allergies       ROS:   CONSTITUTIONAL: No reported fever or chills. No changes in weight.  ENT: No visual disturbance, ear ache, epistaxis or sore throat.   CARDIOVASCULAR: No chest pain, chest pressure or chest discomfort. No palpitations or lower extremity edema.   RESPIRATORY: Positive for increased MARTINEZ. No cough, wheezing or hemoptysis. No reports of orthopnea or PND.   GI: No reported abdominal pain, nausea, vomiting, melena or hematochezia.   : No reported hematuria or dysuria.   NEUROLOGICAL: No lightheadedness, dizziness, syncope, ataxia, paresthesias or  "weakness.   HEMATOLOGIC:  No bleeding or excessive bruising. No history of blood clots.   MUSCULOSKELETAL: No new joint pain or swelling, no muscle pain.  ENDOCRINOLOGIC: No temperature intolerance. No hair or skin changes.  SKIN: No abnormal rashes or sores, no unusual itching.  PSYCHIATRIC: No history of depression or anxiety.      PHYSICAL EXAM:   BP (!) 170/110 (BP Location: Right arm, Patient Position: Sitting, Cuff Size: Adult Regular)   Pulse 68   Temp 97.3  F (36.3  C)   Ht 1.8 m (5' 10.87\")   Wt 114.9 kg (253 lb 3.2 oz)   SpO2 98%   BMI 35.45 kg/m    GENERAL: The patient is a well-developed, well-nourished, in no apparent distress.  HEENT: Head is normocephalic and atraumatic. Eyes are symmetrical with normal visual tracking. No icterus, no xanthelasmas. Nares appeared normal without nasal drainage. Mucous membranes are moist, no cyanosis.  NECK: Supple. No cervical bruits, JVP not visible.   CHEST/ LUNGS: Lungs clear to auscultation, no rales, rhonchi or wheezes, no use of accessory muscles, no retractions, respirations unlabored and normal respiratory rate.   CARDIO: Regular rate and rhythm normal with S1 and S2, no S3 or S4 and no murmur, click or rub.   ABD: Abdomen is mildly distended.   EXTREMITIES: No LE edema present.  MUSCULOSKELETAL: No visible joint swelling.   NEUROLOGIC: Alert and oriented X3. Normal speech, gait and affect. No focal neurologic deficits.   SKIN: No jaundice. No rashes or visible skin lesions present. No ecchymosis.       EKG:    NSR, rate 64 bpm, no ST-T changes    LAB RESULTS:   Office Visit on 02/25/2022   Component Date Value Ref Range Status     Cholesterol 02/25/2022 211 (A) <200 mg/dL Final     Triglycerides 02/25/2022 221 (A) <150 mg/dL Final     Direct Measure HDL 02/25/2022 58  23 - 92 mg/dL Final     LDL Cholesterol Calculated 02/25/2022 109 (A) <=100 mg/dL Final     Non HDL Cholesterol 02/25/2022 153 (A) <130 mg/dL Final     Patient Fasting > 8hrs? 02/25/2022 " Unknown   Final     Sodium 02/25/2022 138  134 - 144 mmol/L Final     Potassium 02/25/2022 4.6  3.5 - 5.1 mmol/L Final     Chloride 02/25/2022 98  98 - 107 mmol/L Final     Carbon Dioxide (CO2) 02/25/2022 32 (A) 21 - 31 mmol/L Final     Anion Gap 02/25/2022 8  3 - 14 mmol/L Final     Urea Nitrogen 02/25/2022 14  7 - 25 mg/dL Final     Creatinine 02/25/2022 1.23  0.70 - 1.30 mg/dL Final     Calcium 02/25/2022 9.9  8.6 - 10.3 mg/dL Final     Glucose 02/25/2022 251 (A) 70 - 105 mg/dL Final     Alkaline Phosphatase 02/25/2022 66  34 - 104 U/L Final     AST 02/25/2022 16  13 - 39 U/L Final     ALT 02/25/2022 18  7 - 52 U/L Final     Protein Total 02/25/2022 7.3  6.4 - 8.9 g/dL Final     Albumin 02/25/2022 4.8  3.5 - 5.7 g/dL Final     Bilirubin Total 02/25/2022 0.8  0.3 - 1.0 mg/dL Final     GFR Estimate 02/25/2022 72  >60 mL/min/1.73m2 Final     Hemoglobin A1C 02/25/2022 7.9 (A) 4.0 - 6.2 % Final     Creatinine Urine mg/dL 02/25/2022 89  mg/dL Final     Albumin Urine mg/L 02/25/2022 87  mg/L Final     Albumin Urine mg/g Cr 02/25/2022 97.75 (A) 0.00 - 17.00 mg/g Cr Final     Hemoglobin 02/25/2022 14.8  13.3 - 17.7 g/dL Final     Ventricular Rate 02/25/2022 66  BPM Final     Atrial Rate 02/25/2022 66  BPM Final     NV Interval 02/25/2022 194  ms Final     QRS Duration 02/25/2022 112  ms Final     QT 02/25/2022 392  ms Final     QTc 02/25/2022 410  ms Final     P Axis 02/25/2022 54  degrees Final     R AXIS 02/25/2022 6  degrees Final     T Axis 02/25/2022 27  degrees Final     Interpretation ECG 02/25/2022    Final                    Value:  Sinus rhythm  Cannot rule out Inferior infarct (cited on or before 29-OCT-2021)  Abnormal ECG  When compared with ECG of 29-OCT-2021 12:05,  No significant change was found  Confirmed by DO BAILEY STACY ([id number]) on 2/27/2022 11:16:11 PM     Transferred Records on 12/07/2021   Component Date Value Ref Range Status     RETINOPATHY 12/07/2021 POSITIVE (A)  Final          ASSESSMENT:    Herb Figueroa presents for cardiology preoperative optimization. Patient was last seen by Dr. Ortiz on 11/23/21. Patient has a history of CAD, s/p cardiac cath at [facility] on 9/8/2020 resulting in x2 stents to oLAD. Repeat cath at [facility] on 10/29/21 revealing stable disease without further coronary intervention, he did have 80% ramus lesion felt unamenable to stenting. Additional PMH includes obesity, hyperlipidemia, hypertension, DM 2, SCOTT, GERD, mild intermittent asthma, history of tobacco abuse (quit in 2013) and vitamin D deficiency.  Today patient reports left chest pressure associated to exertion radiation into left axilla with heaviness in left arm associated. He has also noted progression of exertional dyspnea and occasional episodes of hot and cold sweats. No palpitations, no lightheadedness or syncope.  He has not noted any increased edema. Reports following his last visit the anginal symptoms improved with increase in Imdur to 60 mg daily and over past few months symptoms have been returning. No recent medication adjustments.       PLAN:   1. Pre-operative cardiovascular examination  Patient with significant CAD history with ongoing angina. Currently describes worsening exertional angina.   Last cardiac cath on 10/29/2021 at [facility] revealing 20% in-stent stenosis of the LAD, 40% lesion in native vessel, 80% ramus lesion not amendable to stenting, and 50% circumflex, RCA and left main patent.   Imdur not as effective as previous. Today it was recommended starting Amlodipine 5 mg daily for angina and HTN. BP acutely uncontrolled currently.   ECG stable with no ischemic changes however, given his current exertional anginal symptoms and known CAD history, it has been recommended stress echocardiogram prior to surgical clearance.   Patient has stressed on bike for stress echo and admits that he can do this for upcoming stress test despite neck pain, may convert study to dobutamine if needed.   If abnormal  study would warrant repeat heart cath to consider intervention on Ramus.     - Echo Stress Echocardiogram; Future    2. Mixed hyperlipidemia  Patient is currently on rosuvastatin 40 mg daily.  His LDL remains elevated at 109,  Goal LDL recommended <75.  Recommended adding on Zetia and if not achieving goal LDL in this patient with known premature CAD, then would recommend considering PCSK9 Inhibitor.   Patient declines starting Zetia today. He does not want to add on any new medications.   We did discuss dietary modification for lowering LDL and triglycerides.       3. Coronary artery disease involving native coronary artery of native heart with angina pectoris (H)  See above.   He will continue on Imdur 60 mg daily and beta blocker. Adding on Amlodipine 5 mg daily for angina and HTN.     - Echo Stress Echocardiogram; Future    4. Chest pain on exertion    5. MARTINEZ (dyspnea on exertion)    6. Essential hypertension  BP acutely uncontrolled today and has been running high. He will add on Amlodipine 5 mg daily.   Continue on carvedilol 25 mg twice daily, hydrochlorothiazide 25 mg daily, isosorbide 60 mg daily and lisinopril 40 mg daily.    7. S/P cardiac cath (9/8/2020, 10/29/21)    8. SCOTT on CPAP    9. Status post insertion of drug-eluting stent into left anterior descending (LAD) artery x2 (9/8/2020)  Continue on aspirin 81 mg daily indefinitely, he has also been maintained on chronic Plavix with ASA DAPT with known Ramus lesion.   Continue on carvedilol 25 mg twice daily.  See above recommendations on lipid control.     10. Type 2 diabetes mellitus without complication, without long-term current use of insulin (H)  Hemoglobin A1C POCT   Date Value Ref Range Status   11/20/2020 6.4 (H) 4.0 - 6.0 % Final   08/18/2020 8.3 (H) 4.0 - 6.0 % Final     Hemoglobin A1C   Date Value Ref Range Status   02/25/2022 7.9 (H) 4.0 - 6.2 % Final     Last Comprehensive Metabolic Panel:  Sodium   Date Value Ref Range Status   02/25/2022  138 134 - 144 mmol/L Final   09/24/2020 138 134 - 144 mmol/L Final     Potassium   Date Value Ref Range Status   02/25/2022 4.6 3.5 - 5.1 mmol/L Final   09/24/2020 3.7 3.5 - 5.1 mmol/L Final     Chloride   Date Value Ref Range Status   02/25/2022 98 98 - 107 mmol/L Final   09/24/2020 101 98 - 107 mmol/L Final     Carbon Dioxide   Date Value Ref Range Status   09/24/2020 28 21 - 31 mmol/L Final     Carbon Dioxide (CO2)   Date Value Ref Range Status   02/25/2022 32 (H) 21 - 31 mmol/L Final     Anion Gap   Date Value Ref Range Status   02/25/2022 8 3 - 14 mmol/L Final   09/24/2020 9 3 - 14 mmol/L Final     Glucose   Date Value Ref Range Status   02/25/2022 251 (H) 70 - 105 mg/dL Final   09/24/2020 207 (H) 70 - 105 mg/dL Final     Urea Nitrogen   Date Value Ref Range Status   02/25/2022 14 7 - 25 mg/dL Final   09/24/2020 12 7 - 25 mg/dL Final     Creatinine   Date Value Ref Range Status   02/25/2022 1.23 0.70 - 1.30 mg/dL Final   09/24/2020 0.92 0.70 - 1.30 mg/dL Final     GFR Estimate   Date Value Ref Range Status   02/25/2022 72 >60 mL/min/1.73m2 Final     Comment:     Effective December 21, 2021 eGFRcr in adults is calculated using the 2021 CKD-EPI creatinine equation which includes age and gender (John et al., NEJ, DOI: 10.1056/LIOPgy6940403)   09/24/2020 88 >60 mL/min/[1.73_m2] Final     Calcium   Date Value Ref Range Status   02/25/2022 9.9 8.6 - 10.3 mg/dL Final   09/24/2020 8.9 8.6 - 10.3 mg/dL Final       Thank you for allowing me to participate in the care of your patient. Please do not hesitate to contact me if you have any questions.     Total time 75 minutes spent on date of encounter reviewing records, face-to-face time obtaining HPI, physical exam, reviewing results of recent testing and counseling on the above recommendations and current plan of care.    Cherri Ortez, APRN CNP CHFN

## 2022-03-02 NOTE — TELEPHONE ENCOUNTER
Left message re:stress testing instructions  Emphasis for pt to hold Coreg pm dose and am dose  Left call back number for questions or concerns

## 2022-03-02 NOTE — PATIENT INSTRUCTIONS
You were seen by  MIGUEL Colunga CNP     1. A stress echo has been ordered. You will be called to schedule this. You will receive instructions for testing at that time. You will be contacted with results.      You have a follow up appointment with [facility] with Dr. Ortiz on 9/22/2022 , sooner if needed.       Please call the cardiology office with problems, questions, or concerns at [phone].    If you experience chest pain, chest pressure, chest tightness, shortness of breath, fainting, lightheadedness, nausea, vomiting, or other concerning symptoms, please report to the Emergency Department or call 911. These symptoms may be emergent, and best treated in the Emergency Department.     Cardiology Nurses  SAMINA Strickland, SHABBIR Francois LPN  [facility] (Unit [facility])  [phone]

## 2022-03-03 ENCOUNTER — TELEPHONE (OUTPATIENT)
Dept: CARDIOLOGY | Facility: OTHER | Age: 47
End: 2022-03-03
Payer: COMMERCIAL

## 2022-03-03 LAB
ATRIAL RATE - MUSE: 64 BPM
DIASTOLIC BLOOD PRESSURE - MUSE: NORMAL MMHG
INTERPRETATION ECG - MUSE: NORMAL
P AXIS - MUSE: 55 DEGREES
PR INTERVAL - MUSE: 200 MS
QRS DURATION - MUSE: 104 MS
QT - MUSE: 402 MS
QTC - MUSE: 414 MS
R AXIS - MUSE: 22 DEGREES
SYSTOLIC BLOOD PRESSURE - MUSE: NORMAL MMHG
T AXIS - MUSE: 34 DEGREES
VENTRICULAR RATE- MUSE: 64 BPM

## 2022-03-03 ASSESSMENT — ANXIETY QUESTIONNAIRES: GAD7 TOTAL SCORE: 14

## 2022-03-03 NOTE — TELEPHONE ENCOUNTER
Patient was contacted regarding his stress echo as CONSTANCE Ortez NP wanted it changed to a Lexiscan. Patient verbalized understanding of not having to come today for stress echo and that his new test is scheduled for tomorrow at 11. Patient aware not to have any caffeine for 12 hours prior to the test or food 2-3 hours prior to the test. Check in at diagnostics check in.

## 2022-03-04 ENCOUNTER — HOSPITAL ENCOUNTER (OUTPATIENT)
Dept: NUCLEAR MEDICINE | Facility: OTHER | Age: 47
End: 2022-03-04
Attending: NURSE PRACTITIONER
Payer: COMMERCIAL

## 2022-03-04 VITALS — HEIGHT: 70 IN | BODY MASS INDEX: 36.22 KG/M2 | WEIGHT: 253 LBS

## 2022-03-04 DIAGNOSIS — I10 ESSENTIAL HYPERTENSION: ICD-10-CM

## 2022-03-04 DIAGNOSIS — E78.2 MIXED HYPERLIPIDEMIA: ICD-10-CM

## 2022-03-04 DIAGNOSIS — I25.119 CORONARY ARTERY DISEASE INVOLVING NATIVE CORONARY ARTERY OF NATIVE HEART WITH ANGINA PECTORIS (H): ICD-10-CM

## 2022-03-04 DIAGNOSIS — R06.09 DOE (DYSPNEA ON EXERTION): ICD-10-CM

## 2022-03-04 DIAGNOSIS — Z95.5 STATUS POST INSERTION OF DRUG-ELUTING STENT INTO LEFT ANTERIOR DESCENDING (LAD) ARTERY: ICD-10-CM

## 2022-03-04 DIAGNOSIS — R07.9 CHEST PAIN ON EXERTION: ICD-10-CM

## 2022-03-04 DIAGNOSIS — Z98.890 S/P CARDIAC CATH: ICD-10-CM

## 2022-03-04 DIAGNOSIS — Z01.810 PRE-OPERATIVE CARDIOVASCULAR EXAMINATION: ICD-10-CM

## 2022-03-04 DIAGNOSIS — E11.9 TYPE 2 DIABETES MELLITUS WITHOUT COMPLICATION, WITHOUT LONG-TERM CURRENT USE OF INSULIN (H): ICD-10-CM

## 2022-03-04 PROCEDURE — A9500 TC99M SESTAMIBI: HCPCS | Performed by: NURSE PRACTITIONER

## 2022-03-04 PROCEDURE — 250N000011 HC RX IP 250 OP 636: Performed by: STUDENT IN AN ORGANIZED HEALTH CARE EDUCATION/TRAINING PROGRAM

## 2022-03-04 PROCEDURE — 93018 CV STRESS TEST I&R ONLY: CPT | Performed by: STUDENT IN AN ORGANIZED HEALTH CARE EDUCATION/TRAINING PROGRAM

## 2022-03-04 PROCEDURE — 78452 HT MUSCLE IMAGE SPECT MULT: CPT

## 2022-03-04 PROCEDURE — 343N000001 HC RX 343: Performed by: NURSE PRACTITIONER

## 2022-03-04 PROCEDURE — 93017 CV STRESS TEST TRACING ONLY: CPT

## 2022-03-04 PROCEDURE — 78452 HT MUSCLE IMAGE SPECT MULT: CPT | Mod: 26 | Performed by: STUDENT IN AN ORGANIZED HEALTH CARE EDUCATION/TRAINING PROGRAM

## 2022-03-04 PROCEDURE — 93016 CV STRESS TEST SUPVJ ONLY: CPT | Performed by: STUDENT IN AN ORGANIZED HEALTH CARE EDUCATION/TRAINING PROGRAM

## 2022-03-04 RX ORDER — REGADENOSON 0.08 MG/ML
0.4 INJECTION, SOLUTION INTRAVENOUS ONCE
Status: COMPLETED | OUTPATIENT
Start: 2022-03-04 | End: 2022-03-04

## 2022-03-04 RX ADMIN — KIT FOR THE PREPARATION OF TECHNETIUM TC99M SESTAMIBI 10.63 MILLICURIE: 1 INJECTION, POWDER, LYOPHILIZED, FOR SOLUTION PARENTERAL at 11:10

## 2022-03-04 RX ADMIN — REGADENOSON 0.4 MG: 0.08 INJECTION, SOLUTION INTRAVENOUS at 12:48

## 2022-03-04 RX ADMIN — KIT FOR THE PREPARATION OF TECHNETIUM TC99M SESTAMIBI 42.6 MILLICURIE: 1 INJECTION, POWDER, LYOPHILIZED, FOR SOLUTION PARENTERAL at 12:50

## 2022-03-04 NOTE — PROGRESS NOTES
1100The patient arrived for a Lexiscan Cardiolite stress test.  The procedure, risks, and benefits were discussed with the patient ,and the consent was signed.  A saline lock was started,and the Cardiolite was injected by Nuclear Medicine.  The patient was taken to the waiting area, to await resting images at 1110.  1240The patient returned from Nuclear Medicine and was prepped for the stress test.   Dr. Doe arrived, and the patient was administered the Lexiscan per procedure.  The patient tolerated the procedure.  He was given a snack and taken to Nuclear Medicine in stable condition for stress images.  The saline lock will be removed by Nuclear Medicine for proper disposal.  The patient was instructed that the ordering MD will call with results in one to two days.  Please see the chart for the complete test results.

## 2022-03-07 LAB
CV BLOOD PRESSURE: 63 MMHG
CV STRESS MAX HR HE: 87
NUC STRESS EJECTION FRACTION: 61 %
RATE PRESSURE PRODUCT: NORMAL
STRESS ECHO BASELINE DIASTOLIC HE: 79
STRESS ECHO BASELINE HR: 54 BPM
STRESS ECHO BASELINE SYSTOLIC BP: 151
STRESS ECHO CALCULATED PERCENT HR: 51 %
STRESS ECHO LAST STRESS DIASTOLIC BP: 69
STRESS ECHO LAST STRESS SYSTOLIC BP: 124
STRESS ECHO POST ESTIMATED WORKLOAD: 1 METS
STRESS ECHO TARGET HR: 172

## 2022-03-08 ENCOUNTER — TELEPHONE (OUTPATIENT)
Dept: CARDIOLOGY | Facility: OTHER | Age: 47
End: 2022-03-08
Payer: COMMERCIAL

## 2022-03-08 NOTE — TELEPHONE ENCOUNTER
"Patient given the following results: \"Stable stress test without ischemia, patient is cleared to proceed with scheduled surgery.   Thanks,   Cherri Ortez, MIGUEL CNP\"    Em Haywood RN......March 8, 2022...4:06 PM   "

## 2022-03-21 ENCOUNTER — TELEPHONE (OUTPATIENT)
Dept: CARDIOLOGY | Facility: OTHER | Age: 47
End: 2022-03-21
Payer: COMMERCIAL

## 2022-03-21 NOTE — TELEPHONE ENCOUNTER
Cherri Ortez APRN CNP Anderson, Kayla K, RN  Please call patient and let him know that the interventional cardiology team at the [facility] (where he had his last heart cath) has reviewed holding ASA for his back surgery. This is not favored to hold the ASA due to the ostial ramus artery lesion that was not amenable to percutaneous intervention. Holding this medication for his surgery would place him at risk for a surgery related cardiac event despite his stable stress test. We have also spoke to his surgeon Dr. Ram. He advised that the ASA would have to be held for this specific surgery with risk for bleeding complication. We understand that this is a difficult spot to be in and I would like to offer him an appointment with one of our interventional cardiologists to review possible treatments for his remaining coronary disease and long term plans. Please let me know if he would like this scheduled. We could possibly set this up as a virtual visit.   Thanks,   MIGUEL Nelson CNP             Previous Messages       ----- Message -----   From: Marcial Ram MD   Sent: 3/21/2022   7:10 AM CDT   To: Cherri Chacon APRN CNP, *   Subject: RE: ?hold ASA 81 mg prior to surgery?             Aspirin will have to be held 3 days prior to and 3 days after surgery given the bleeding risk of his surgery.     Thanks you.     Sanya Ram          Patient notified of above information and set up to see Dr. Velázquez via video visit on 4/7/22 at 12:30.  Patient wondering about pain medication management and advised to reach out to his surgeon in regards to this per MIGUEL Colunga CNP.  Em Haywood RN......March 21, 2022...12:19 PM

## 2022-03-21 NOTE — TELEPHONE ENCOUNTER
Notified SHABBIR Rashid in Ortho of below information.  Em Haywood RN......March 21, 2022...1:32 PM

## 2022-03-21 NOTE — TELEPHONE ENCOUNTER
Patient states RY to Em, said he can be reached from 12-12:30pm or after 4pm today.    Ruth Ann Bennett on 3/21/2022 at 11:34 AM

## 2022-03-24 ENCOUNTER — TELEPHONE (OUTPATIENT)
Dept: FAMILY MEDICINE | Facility: OTHER | Age: 47
End: 2022-03-24
Payer: COMMERCIAL

## 2022-03-24 ENCOUNTER — TELEPHONE (OUTPATIENT)
Dept: CARDIOLOGY | Facility: OTHER | Age: 47
End: 2022-03-24
Payer: COMMERCIAL

## 2022-03-24 DIAGNOSIS — M54.50 LOW BACK PAIN WITHOUT SCIATICA, UNSPECIFIED BACK PAIN LATERALITY, UNSPECIFIED CHRONICITY: ICD-10-CM

## 2022-03-24 NOTE — TELEPHONE ENCOUNTER
[id number] sent Rx request for the following:      Requested Prescriptions   Pending Prescriptions Disp Refills     gabapentin (NEURONTIN) 300 MG capsule [Pharmacy Med Name: GABAPENTIN 300MG CAPSULE] 180 capsule 11     Sig: TAKE 2 CAPSULES BY MOUTH THREE TIMES DAILY   Last Prescription Date:   1/11/21  Last Fill Qty/Refills:         180, R-11    Last Office Visit:              2/25/22   Future Office visit:           None    In clinical absence of patient's primary, Sg Szymanski, patient is requesting that this message be sent to the covering provider for consideration please.    Stefanie Mae RN .............. 3/24/2022  3:28 PM

## 2022-03-24 NOTE — TELEPHONE ENCOUNTER
Patient called and states that he is having so much neck and shoulder pain that he was crying yesterday at work.  He cannot get his surgery by Dr. Ram on his neck yet due to being on aspirin and needing to meet with Dr. Velázquez via telemed.  Patient is also having chest pain due to the stress of this all.  He is requesting to be put on medical leave.  He states he wasn't sure if this would be done by his primary care provider, MIGUEL Colunga CNP or his surgeon Dr. Ram.  He has put out phone calls to all 3.  To MIGUEL Colunga CNP to address.  Em Haywood RN......March 24, 2022...11:25 AM

## 2022-03-24 NOTE — TELEPHONE ENCOUNTER
Patient called to talk to Dr. Szymanski. He would like a call back.    Kimberly aMo on 3/24/2022 at 9:26 AM

## 2022-03-24 NOTE — TELEPHONE ENCOUNTER
"Per MIGUEL Colunga CNP: \"This needs to go through his surgeon, at this point nothing has changed from cardiology standpoint. He had a normal stress test with recent report of anginal symptoms. He is scheduled to see interventional cardiology to talk about coronary treatment in the long term and currently not safe option to hold ASA for his surgery. His cardiac situation would not limit him at the given time to qualify for medical leave. In regards to his acute neck pain, medical leave could be reviewed by his surgical team.   Thanks,   MIGUEL Nelson CNP\"    Patient notified of this and states that the surgeon recommended this would need to go through his PCP as he has yet to have his surgery.  Patient will await to hear from Dr. Szymanski's office.  Em Haywood RN......March 24, 2022...1:24 PM   "

## 2022-03-25 RX ORDER — GABAPENTIN 300 MG/1
CAPSULE ORAL
Qty: 180 CAPSULE | Refills: 0 | Status: SHIPPED | OUTPATIENT
Start: 2022-03-25 | End: 2022-06-23

## 2022-03-25 NOTE — TELEPHONE ENCOUNTER
"Patient is wondering if you can write a letter for him to be off work until \"they figure out his neck\"? He was to have surgery  On 3/24/2022 but patient states they cancelled it due to his medications that he is taking. He is aware that this will not be addressed until Monday 3/28/2022.  Alyson Ruiz LPN ....................  3/25/2022   8:36 AM      "

## 2022-03-28 NOTE — TELEPHONE ENCOUNTER
"We do not \"take people off work.\"  We can certainly write a letter with some restrictions such as weight restrictions.  If he is interested in that we can complete his paperwork today.  "

## 2022-03-30 ENCOUNTER — TELEPHONE (OUTPATIENT)
Dept: FAMILY MEDICINE | Facility: OTHER | Age: 47
End: 2022-03-30
Payer: COMMERCIAL

## 2022-03-31 NOTE — TELEPHONE ENCOUNTER
Notified patient of providers note. He states that he will hold off on letter as his employer does not do restrictions. He will talk with HR and lets us know if anything changes where he will need letter.  Alyson Ruiz LPN ....................  3/31/2022   9:33 AM

## 2022-04-07 ENCOUNTER — VIRTUAL VISIT (OUTPATIENT)
Dept: CARDIOLOGY | Facility: CLINIC | Age: 47
End: 2022-04-07
Attending: INTERNAL MEDICINE
Payer: COMMERCIAL

## 2022-04-07 DIAGNOSIS — I25.10 CORONARY ARTERY DISEASE INVOLVING NATIVE CORONARY ARTERY OF NATIVE HEART WITHOUT ANGINA PECTORIS: Primary | ICD-10-CM

## 2022-04-07 PROCEDURE — 99213 OFFICE O/P EST LOW 20 MIN: CPT | Mod: 95

## 2022-04-07 NOTE — LETTER
4/7/2022      RE: Herb Figueroa  [id number] [id number]       Dear Colleague,    Thank you for the opportunity to participate in the care of your patient, Herb Figueroa, at the [facility] HEART [facility] at [facility]. Please see a copy of my visit note below.    This is a virtual visit.    [facility] HEART CARE   CARDIOLOGY PROGRESS NOTE     HPI:    Mr. Figueroa is a 46 year old male with DM and SCOTT, who presents for cardiology for possible PCI of the LCx.   Patient has a history of CAD, s/p cardiac cath at [facility] on 9/8/2020 resulting in x2 stents to LAD. Repeat cath at [facility] on 10/29/21 revealing stable disease without further coronary intervention, he does have 80% ramus lesion felt unamenable to stenting.  He is having severe neck pain and is scheduled to get a cervical spine surgery later this month in [place], and is here for pre-op evaluation and possible PCI of the Ramus.     Today patient reports his chest pain is occasional bilateral in the upper chest near his shoulders that only happens with very strenuous exercise carrying close to 100 pounds repeatedly. No CP or sx with going up 2 flights of stairs or moderate exertion.  He says his occasional sx that he get feel muscular in nature and are not similar to his sx in 2020 when he required PCI.   he says his BP is well controlled.       Past Medical History:   Diagnosis Date     Encounter for screening for cardiovascular disorders     02-11, Negative stress test     Essential (primary) hypertension     No Comments Provided     Gastro-esophageal reflux disease without esophagitis     No Comments Provided     Major depressive disorder, single episode     After the death of his daughter in MVA     Mild intermittent asthma, uncomplicated     No Comments Provided     Nicotine dependence, uncomplicated     age [age], 1-2 packs per day, Quit Aug 2005     Suicidal ideations      2013,Hospitalized in [place]       Past Surgical History:   Procedure Laterality Date     CV CORONARY ANGIOGRAM N/A 10/29/2021    Procedure: CV CORONARY ANGIOGRAM;  Surgeon: Juan Hdz MD;  Location:  HEART CARDIAC CATH LAB     LAPAROSCOPIC CHOLECYSTECTOMY      11-05,Dr. Dunne     TYMPANOSTOMY, LOCAL/TOPICAL ANESTHESIA      No Comments Provided       No Known Allergies    Current Outpatient Medications   Medication Sig Dispense Refill     amLODIPine (NORVASC) 5 MG tablet Take 1 tablet (5 mg) by mouth daily 90 tablet 1     aspirin (ASA) 81 MG chewable tablet TAKE 1 TABLET (81 MG) BY MOUTH DAILY 90 tablet 3     blood glucose (NO BRAND SPECIFIED) test strip Use to test blood sugar 1 times daily. 100 each 11     blood glucose monitoring (ACCU-CHEK LILIANA PLUS) meter device kit NEEDS METER,STRIPS,LANCETS 1 kit 1     blood glucose monitoring (ACCU-CHEK MULTICLIX) lancets Use to test blood sugar 1 times daily. 102 each 11     carvedilol (COREG) 12.5 MG tablet Take 2 tablets (25 mg) by mouth 2 times daily (with meals) 180 tablet 3     citalopram (CELEXA) 20 MG tablet TAKE 1 TABLET (20 MG) BY MOUTH DAILY 90 tablet 3     clopidogrel (PLAVIX) 75 MG tablet Take 1 tablet (75 mg) by mouth daily 90 tablet 3     gabapentin (NEURONTIN) 300 MG capsule TAKE 2 CAPSULES BY MOUTH THREE TIMES DAILY 180 capsule 0     glipiZIDE (GLUCOTROL XL) 10 MG 24 hr tablet TAKE 1 TAB BY MOUTH ONCE DAILY 90 tablet 3     hydrochlorothiazide (HYDRODIURIL) 25 MG tablet TAKE 1 TABLET (25 MG) BY MOUTH DAILY 90 tablet 3     isosorbide mononitrate (IMDUR) 60 MG 24 hr tablet Take 1 tablet (60 mg) by mouth daily 90 tablet 3     lisinopril (ZESTRIL) 40 MG tablet TAKE 1 TAB BY MOUTH ONCE DAILY 90 tablet 3     meloxicam (MOBIC) 15 MG tablet TAKE 1 TAB BY MOUTH ONCE DAILY 90 tablet 3     metFORMIN (GLUCOPHAGE) 1000 MG tablet TAKE 1 TABLET BY MOUTH TWICE A DAY WITH MEALS (Patient taking differently: Not currently taking due to nausea) 180 tablet 3      nitroGLYcerin (NITROSTAT) 0.4 MG sublingual tablet For chest pain place 1 tablet under the tongue every 5 minutes for 3 doses. If symptoms persist 5 minutes after 1st dose call 911. 25 tablet 3     omeprazole (PRILOSEC) 40 MG DR capsule TAKE 1 CAPSULE BY MOUTH ONCE DAILY WITH FOOD 90 capsule 3     rosuvastatin (CRESTOR) 40 MG tablet Take 1 tablet (40 mg) by mouth daily 90 tablet 3       Social History     Socioeconomic History     Marital status: Legally      Spouse name: Not on file     Number of children: Not on file     Years of education: Not on file     Highest education level: Not on file   Occupational History     Not on file   Tobacco Use     Smoking status: Former Smoker     Packs/day: 0.00     Years: 20.00     Pack years: 0.00     Types: Cigarettes     Quit date: 2013     Years since quittin.6     Smokeless tobacco: Never Used   Vaping Use     Vaping Use: Never used   Substance and Sexual Activity     Alcohol use: Yes     Comment: Alcoholic Drinks/day: occasionally-rare 4 tomes a year     Drug use: Not Currently     Sexual activity: Not Currently   Other Topics Concern     Parent/sibling w/ CABG, MI or angioplasty before 65F 55M? Not Asked   Social History Narrative    .  Two sons.      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      Works as a  at [employer].      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     Social Determinants of Health     Financial Resource Strain: Not on file   Food Insecurity: Not on file   Transportation Needs: Not on file   Physical Activity: Not on file   Stress: Not on file   Social Connections: Not on file   Intimate Partner Violence: Not on file   Housing Stability: Not on file       LAB RESULTS:   Transferred Records on 2020   Component Date Value Ref Range Status     Potassium 2020 3.7  3.5 - 5.1 mmol/L Final     Glucose 2020 151* 60 - 99  mg/dL Final     Creatinine 09/09/2020 0.90  0.80 - 1.50 mg/dL Final     GFR Estimate 09/09/2020 >60  SEE SCAN ml/min/1.73m2 Final   Allied Health/Nurse Visit on 09/05/2020   Component Date Value Ref Range Status     COVID-19 Virus PCR to [facility] - So* 09/05/2020 Nasopharyngeal   Final     COVID-19 Virus PCR to [facility] - Re* 09/05/2020 Not Detected   Final   Orders Only on 09/02/2020   Component Date Value Ref Range Status     Sodium 09/02/2020 141  134 - 144 mmol/L Final     Potassium 09/02/2020 3.9  3.5 - 5.1 mmol/L Final     Chloride 09/02/2020 99  98 - 107 mmol/L Final     Carbon Dioxide 09/02/2020 33* 21 - 31 mmol/L Final     Anion Gap 09/02/2020 9  3 - 14 mmol/L Final     Glucose 09/02/2020 187* 70 - 105 mg/dL Final     Urea Nitrogen 09/02/2020 12  7 - 25 mg/dL Final     Creatinine 09/02/2020 1.04  0.70 - 1.30 mg/dL Final     GFR Estimate 09/02/2020 77  >60 mL/min/[1.73_m2] Final     GFR Estimate If Black 09/02/2020 >90  >60 mL/min/[1.73_m2] Final     Calcium 09/02/2020 9.7  8.6 - 10.3 mg/dL Final     WBC 09/02/2020 4.5  4.0 - 11.0 10e9/L Final     RBC Count 09/02/2020 5.41  4.4 - 5.9 10e12/L Final     Hemoglobin 09/02/2020 15.6  13.3 - 17.7 g/dL Final     Hematocrit 09/02/2020 46.4  40.0 - 53.0 % Final     MCV 09/02/2020 86  78 - 100 fl Final     MCH 09/02/2020 28.8  26.5 - 33.0 pg Final     MCHC 09/02/2020 33.6  31.5 - 36.5 g/dL Final     RDW 09/02/2020 11.9  10.0 - 15.0 % Final     Platelet Count 09/02/2020 157  150 - 450 10e9/L Final   Office Visit on 08/27/2020   Component Date Value Ref Range Status     Interpretation ECG 08/27/2020 Click View Image link to view waveform and result   Final   Office Visit on 08/18/2020   Component Date Value Ref Range Status     Hemoglobin A1C 08/18/2020 8.3* 4.0 - 6.0 % Final        Review of systems: Negative except that which was noted in the HPI.    Tests:   Coronary angiogram was reviewed. There is an 80% ostial ramus lesion. Stenting might impinge on the LCx and  "cause plaque shift requiring PCI of the LCx as well.    Nuclear stress test on 3/4/22 without ischemia    Assessment and Plan    # CAD and pre-op evaluation: Patient has atypical CP that only happens with very strenuous activity. His most recent nuclear stress test did not show any ischemia in the ramus territory. He is also able to achieve >4 METS without CP. He has an upcoming cervical spine surgery that is being done for debilitating sx. Patient understands that he is at a higher risk of perioperative cardiac complications than a normal person, but given his symptoms, he can proceed with surgery without PCI.  - Recommend discontinuing plavix 5 days before the surgery.  - Recommend that patient stays on uninterrupted aspirin during the surgery. If the surgical team however does not think this is possible, aspirin can be stopped 5 days before the surgery and restarted on POD1.  - Surgery to be performed in a center with 24/7 cardiology backup.  - Continue other cardiac meds without any changes.    Thank you for this consultation.  Josefina Stafford,  PGY7 Interventional Cardiology    Patient was evaluated and assessment and plan was discussed with Dr.Raveendran Contreras is a 46 year old who is being evaluated via a billable video visit.      How would you like to obtain your AVS? Mail a copy  If the video visit is dropped, the invitation should be resent by: Text to cell phone: [phone]  Will anyone else be joining your video visit? No  {If patient encounters technical issues they should call [phone] :[id number]}     Renetta Bennett Visit Facilitator/MA.      Video Start Time: {video visit start/end time for provider to select:644353}  Video-Visit Details    Type of service:  Video Visit    Video End Time:{video visit start/end time for provider to select:[id number]}    Originating Location (pt. Location): {video visit patient location:[address]::\"Home\"}    Distant Location (provider location):  [facility]" "HEART CLINIC [place]     Platform used for Video Visit: {Virtual Visit Platforms:147782::\"AmWell\"}          Please do not hesitate to contact me if you have any questions/concerns.     Sincerely,     [facility]    "

## 2022-04-07 NOTE — PROGRESS NOTES
This is a virtual visit.    [facility] HEART CARE   CARDIOLOGY PROGRESS NOTE     HPI:    Mr. Figueroa is a 46 year old male with DM and SCOTT, who presents for cardiology for possible PCI of the LCx.   Patient has a history of CAD, s/p cardiac cath at [facility] on 9/8/2020 resulting in x2 stents to LAD. Repeat cath at [facility] on 10/29/21 revealing stable disease without further coronary intervention, he does have 80% ramus lesion felt unamenable to stenting.  He is having severe neck pain and is scheduled to get a cervical spine surgery later this month in [place], and is here for pre-op evaluation and possible PCI of the Ramus.     Today patient reports his chest pain is occasional bilateral in the upper chest near his shoulders that only happens with very strenuous exercise carrying close to 100 pounds repeatedly. No CP or sx with going up 2 flights of stairs or moderate exertion.  He says his occasional sx that he get feel muscular in nature and are not similar to his sx in 2020 when he required PCI.   he says his BP is well controlled.       Past Medical History:   Diagnosis Date     Encounter for screening for cardiovascular disorders     02-11, Negative stress test     Essential (primary) hypertension     No Comments Provided     Gastro-esophageal reflux disease without esophagitis     No Comments Provided     Major depressive disorder, single episode     After the death of his daughter in MVA     Mild intermittent asthma, uncomplicated     No Comments Provided     Nicotine dependence, uncomplicated     age [age], 1-2 packs per day, Quit Aug 2005     Suicidal ideations     2013,Hospitalized in [place]       Past Surgical History:   Procedure Laterality Date     CV CORONARY ANGIOGRAM N/A 10/29/2021    Procedure: CV CORONARY ANGIOGRAM;  Surgeon: Juan Hdz MD;  Location: [facility] CARDIAC CATH LAB     LAPAROSCOPIC CHOLECYSTECTOMY      11-05,Dr. Dunne     TYMPANOSTOMY, LOCAL/TOPICAL ANESTHESIA      No Comments  Provided       No Known Allergies    Current Outpatient Medications   Medication Sig Dispense Refill     amLODIPine (NORVASC) 5 MG tablet Take 1 tablet (5 mg) by mouth daily 90 tablet 1     aspirin (ASA) 81 MG chewable tablet TAKE 1 TABLET (81 MG) BY MOUTH DAILY 90 tablet 3     blood glucose (NO BRAND SPECIFIED) test strip Use to test blood sugar 1 times daily. 100 each 11     blood glucose monitoring (ACCU-CHEK LILIANA PLUS) meter device kit NEEDS METER,STRIPS,LANCETS 1 kit 1     blood glucose monitoring (ACCU-CHEK MULTICLIX) lancets Use to test blood sugar 1 times daily. 102 each 11     carvedilol (COREG) 12.5 MG tablet Take 2 tablets (25 mg) by mouth 2 times daily (with meals) 180 tablet 3     citalopram (CELEXA) 20 MG tablet TAKE 1 TABLET (20 MG) BY MOUTH DAILY 90 tablet 3     clopidogrel (PLAVIX) 75 MG tablet Take 1 tablet (75 mg) by mouth daily 90 tablet 3     gabapentin (NEURONTIN) 300 MG capsule TAKE 2 CAPSULES BY MOUTH THREE TIMES DAILY 180 capsule 0     glipiZIDE (GLUCOTROL XL) 10 MG 24 hr tablet TAKE 1 TAB BY MOUTH ONCE DAILY 90 tablet 3     hydrochlorothiazide (HYDRODIURIL) 25 MG tablet TAKE 1 TABLET (25 MG) BY MOUTH DAILY 90 tablet 3     isosorbide mononitrate (IMDUR) 60 MG 24 hr tablet Take 1 tablet (60 mg) by mouth daily 90 tablet 3     lisinopril (ZESTRIL) 40 MG tablet TAKE 1 TAB BY MOUTH ONCE DAILY 90 tablet 3     meloxicam (MOBIC) 15 MG tablet TAKE 1 TAB BY MOUTH ONCE DAILY 90 tablet 3     metFORMIN (GLUCOPHAGE) 1000 MG tablet TAKE 1 TABLET BY MOUTH TWICE A DAY WITH MEALS (Patient taking differently: Not currently taking due to nausea) 180 tablet 3     nitroGLYcerin (NITROSTAT) 0.4 MG sublingual tablet For chest pain place 1 tablet under the tongue every 5 minutes for 3 doses. If symptoms persist 5 minutes after 1st dose call 911. 25 tablet 3     omeprazole (PRILOSEC) 40 MG DR capsule TAKE 1 CAPSULE BY MOUTH ONCE DAILY WITH FOOD 90 capsule 3     rosuvastatin (CRESTOR) 40 MG tablet Take 1 tablet (40  mg) by mouth daily 90 tablet 3       Social History     Socioeconomic History     Marital status: Legally      Spouse name: Not on file     Number of children: Not on file     Years of education: Not on file     Highest education level: Not on file   Occupational History     Not on file   Tobacco Use     Smoking status: Former Smoker     Packs/day: 0.00     Years: 20.00     Pack years: 0.00     Types: Cigarettes     Quit date: 2013     Years since quittin.6     Smokeless tobacco: Never Used   Vaping Use     Vaping Use: Never used   Substance and Sexual Activity     Alcohol use: Yes     Comment: Alcoholic Drinks/day: occasionally-rare 4 tomes a year     Drug use: Not Currently     Sexual activity: Not Currently   Other Topics Concern     Parent/sibling w/ CABG, MI or angioplasty before 65F 55M? Not Asked   Social History Narrative    .  Two sons.      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      Works as a  at [employer].      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     Social Determinants of Health     Financial Resource Strain: Not on file   Food Insecurity: Not on file   Transportation Needs: Not on file   Physical Activity: Not on file   Stress: Not on file   Social Connections: Not on file   Intimate Partner Violence: Not on file   Housing Stability: Not on file       LAB RESULTS:   Transferred Records on 2020   Component Date Value Ref Range Status     Potassium 2020 3.7  3.5 - 5.1 mmol/L Final     Glucose 2020 151* 60 - 99 mg/dL Final     Creatinine 2020 0.90  0.80 - 1.50 mg/dL Final     GFR Estimate 2020 >60  SEE SCAN ml/min/1.73m2 Final   Allied Health/Nurse Visit on 2020   Component Date Value Ref Range Status     COVID-19 Virus PCR to [facility] - So* 2020 Nasopharyngeal   Final     COVID-19 Virus PCR to [facility] - Re* 2020 Not Detected   Final    Orders Only on 09/02/2020   Component Date Value Ref Range Status     Sodium 09/02/2020 141  134 - 144 mmol/L Final     Potassium 09/02/2020 3.9  3.5 - 5.1 mmol/L Final     Chloride 09/02/2020 99  98 - 107 mmol/L Final     Carbon Dioxide 09/02/2020 33* 21 - 31 mmol/L Final     Anion Gap 09/02/2020 9  3 - 14 mmol/L Final     Glucose 09/02/2020 187* 70 - 105 mg/dL Final     Urea Nitrogen 09/02/2020 12  7 - 25 mg/dL Final     Creatinine 09/02/2020 1.04  0.70 - 1.30 mg/dL Final     GFR Estimate 09/02/2020 77  >60 mL/min/[1.73_m2] Final     GFR Estimate If Black 09/02/2020 >90  >60 mL/min/[1.73_m2] Final     Calcium 09/02/2020 9.7  8.6 - 10.3 mg/dL Final     WBC 09/02/2020 4.5  4.0 - 11.0 10e9/L Final     RBC Count 09/02/2020 5.41  4.4 - 5.9 10e12/L Final     Hemoglobin 09/02/2020 15.6  13.3 - 17.7 g/dL Final     Hematocrit 09/02/2020 46.4  40.0 - 53.0 % Final     MCV 09/02/2020 86  78 - 100 fl Final     MCH 09/02/2020 28.8  26.5 - 33.0 pg Final     MCHC 09/02/2020 33.6  31.5 - 36.5 g/dL Final     RDW 09/02/2020 11.9  10.0 - 15.0 % Final     Platelet Count 09/02/2020 157  150 - 450 10e9/L Final   Office Visit on 08/27/2020   Component Date Value Ref Range Status     Interpretation ECG 08/27/2020 Click View Image link to view waveform and result   Final   Office Visit on 08/18/2020   Component Date Value Ref Range Status     Hemoglobin A1C 08/18/2020 8.3* 4.0 - 6.0 % Final        Review of systems: Negative except that which was noted in the HPI.    Tests:   Coronary angiogram was reviewed. There is an 80% ostial ramus lesion. Stenting might impinge on the LCx and cause plaque shift requiring PCI of the LCx as well.    Nuclear stress test on 3/4/22 without ischemia    Assessment and Plan    # CAD and pre-op evaluation: Patient has atypical CP that only happens with very strenuous activity. His most recent nuclear stress test did not show any ischemia in the ramus territory. He is also able to achieve >4 METS without CP.  He has an upcoming cervical spine surgery that is being done for debilitating sx. Patient understands that he is at a higher risk of perioperative cardiac complications than a normal person, but given his symptoms, he can proceed with surgery without PCI.  - Recommend discontinuing plavix 5 days before the surgery.  - Recommend that patient stays on uninterrupted aspirin during the surgery. If the surgical team however does not think this is possible, aspirin can be stopped 5 days before the surgery and restarted on POD1.  - Surgery to be performed in a center with 24/7 cardiology backup.  - Continue other cardiac meds without any changes.    Thank you for this consultation.  Josefina Stafford,  PGY7 Interventional Cardiology    Patient was evaluated and assessment and plan was discussed with     Patient seen and examined with Cardiovascular fellow and agree with the assessment and plan described above.     Yonatan Velázquez M.D.  Interventional Cardiology  [facility]       Patient seen and examined with Cardiovascular fellow and agree with the assessment and plan described above.     Yonatan Velázquez M.D.  Interventional Cardiology  [facility]

## 2022-04-07 NOTE — LETTER
4/7/2022      RE: Herb Figueroa  [address] [address] [address]       This is a virtual visit.    [facility] HEART CARE   CARDIOLOGY PROGRESS NOTE     HPI:    Mr. Figueroa is a 46 year old male with DM and SCOTT, who presents for cardiology for possible PCI of the LCx.   Patient has a history of CAD, s/p cardiac cath at [facility] on 9/8/2020 resulting in x2 stents to LAD. Repeat cath at [facility] on 10/29/21 revealing stable disease without further coronary intervention, he does have 80% ramus lesion felt unamenable to stenting.  He is having severe neck pain and is scheduled to get a cervical spine surgery later this month in [place], and is here for pre-op evaluation and possible PCI of the Ramus.     Today patient reports his chest pain is occasional bilateral in the upper chest near his shoulders that only happens with very strenuous exercise carrying close to 100 pounds repeatedly. No CP or sx with going up 2 flights of stairs or moderate exertion.  He says his occasional sx that he get feel muscular in nature and are not similar to his sx in 2020 when he required PCI.   he says his BP is well controlled.       Past Medical History:   Diagnosis Date     Encounter for screening for cardiovascular disorders     02-11, Negative stress test     Essential (primary) hypertension     No Comments Provided     Gastro-esophageal reflux disease without esophagitis     No Comments Provided     Major depressive disorder, single episode     After the death of his daughter in MVA     Mild intermittent asthma, uncomplicated     No Comments Provided     Nicotine dependence, uncomplicated     age [age], 1-2 packs per day, Quit Aug 2005     Suicidal ideations     2013,Hospitalized in [place]       Past Surgical History:   Procedure Laterality Date     CV CORONARY ANGIOGRAM N/A 10/29/2021    Procedure: CV CORONARY ANGIOGRAM;  Surgeon: Juan Hdz MD;  Location: [facility] CARDIAC CATH LAB     LAPAROSCOPIC CHOLECYSTECTOMY       11-05,Dr. Dunne     TYMPANOSTOMY, LOCAL/TOPICAL ANESTHESIA      No Comments Provided       No Known Allergies    Current Outpatient Medications   Medication Sig Dispense Refill     amLODIPine (NORVASC) 5 MG tablet Take 1 tablet (5 mg) by mouth daily 90 tablet 1     aspirin (ASA) 81 MG chewable tablet TAKE 1 TABLET (81 MG) BY MOUTH DAILY 90 tablet 3     blood glucose (NO BRAND SPECIFIED) test strip Use to test blood sugar 1 times daily. 100 each 11     blood glucose monitoring (ACCU-CHEK LILIANA PLUS) meter device kit NEEDS METER,STRIPS,LANCETS 1 kit 1     blood glucose monitoring (ACCU-CHEK MULTICLIX) lancets Use to test blood sugar 1 times daily. 102 each 11     carvedilol (COREG) 12.5 MG tablet Take 2 tablets (25 mg) by mouth 2 times daily (with meals) 180 tablet 3     citalopram (CELEXA) 20 MG tablet TAKE 1 TABLET (20 MG) BY MOUTH DAILY 90 tablet 3     clopidogrel (PLAVIX) 75 MG tablet Take 1 tablet (75 mg) by mouth daily 90 tablet 3     gabapentin (NEURONTIN) 300 MG capsule TAKE 2 CAPSULES BY MOUTH THREE TIMES DAILY 180 capsule 0     glipiZIDE (GLUCOTROL XL) 10 MG 24 hr tablet TAKE 1 TAB BY MOUTH ONCE DAILY 90 tablet 3     hydrochlorothiazide (HYDRODIURIL) 25 MG tablet TAKE 1 TABLET (25 MG) BY MOUTH DAILY 90 tablet 3     isosorbide mononitrate (IMDUR) 60 MG 24 hr tablet Take 1 tablet (60 mg) by mouth daily 90 tablet 3     lisinopril (ZESTRIL) 40 MG tablet TAKE 1 TAB BY MOUTH ONCE DAILY 90 tablet 3     meloxicam (MOBIC) 15 MG tablet TAKE 1 TAB BY MOUTH ONCE DAILY 90 tablet 3     metFORMIN (GLUCOPHAGE) 1000 MG tablet TAKE 1 TABLET BY MOUTH TWICE A DAY WITH MEALS (Patient taking differently: Not currently taking due to nausea) 180 tablet 3     nitroGLYcerin (NITROSTAT) 0.4 MG sublingual tablet For chest pain place 1 tablet under the tongue every 5 minutes for 3 doses. If symptoms persist 5 minutes after 1st dose call 911. 25 tablet 3     omeprazole (PRILOSEC) 40 MG DR capsule TAKE 1 CAPSULE BY MOUTH ONCE DAILY  WITH FOOD 90 capsule 3     rosuvastatin (CRESTOR) 40 MG tablet Take 1 tablet (40 mg) by mouth daily 90 tablet 3       Social History     Socioeconomic History     Marital status: Legally      Spouse name: Not on file     Number of children: Not on file     Years of education: Not on file     Highest education level: Not on file   Occupational History     Not on file   Tobacco Use     Smoking status: Former Smoker     Packs/day: 0.00     Years: 20.00     Pack years: 0.00     Types: Cigarettes     Quit date: 2013     Years since quittin.6     Smokeless tobacco: Never Used   Vaping Use     Vaping Use: Never used   Substance and Sexual Activity     Alcohol use: Yes     Comment: Alcoholic Drinks/day: occasionally-rare 4 tomes a year     Drug use: Not Currently     Sexual activity: Not Currently   Other Topics Concern     Parent/sibling w/ CABG, MI or angioplasty before 65F 55M? Not Asked   Social History Narrative    .  Two sons.      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      Works as a  at [employer].      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     Social Determinants of Health     Financial Resource Strain: Not on file   Food Insecurity: Not on file   Transportation Needs: Not on file   Physical Activity: Not on file   Stress: Not on file   Social Connections: Not on file   Intimate Partner Violence: Not on file   Housing Stability: Not on file       LAB RESULTS:   Transferred Records on 2020   Component Date Value Ref Range Status     Potassium 2020 3.7  3.5 - 5.1 mmol/L Final     Glucose 2020 151* 60 - 99 mg/dL Final     Creatinine 2020 0.90  0.80 - 1.50 mg/dL Final     GFR Estimate 2020 >60  SEE SCAN ml/min/1.73m2 Final   Allied Health/Nurse Visit on 2020   Component Date Value Ref Range Status     COVID-19 Virus PCR to [facility] - So* 2020  Nasopharyngeal   Final     COVID-19 Virus PCR to [facility] - Re* 09/05/2020 Not Detected   Final   Orders Only on 09/02/2020   Component Date Value Ref Range Status     Sodium 09/02/2020 141  134 - 144 mmol/L Final     Potassium 09/02/2020 3.9  3.5 - 5.1 mmol/L Final     Chloride 09/02/2020 99  98 - 107 mmol/L Final     Carbon Dioxide 09/02/2020 33* 21 - 31 mmol/L Final     Anion Gap 09/02/2020 9  3 - 14 mmol/L Final     Glucose 09/02/2020 187* 70 - 105 mg/dL Final     Urea Nitrogen 09/02/2020 12  7 - 25 mg/dL Final     Creatinine 09/02/2020 1.04  0.70 - 1.30 mg/dL Final     GFR Estimate 09/02/2020 77  >60 mL/min/[1.73_m2] Final     GFR Estimate If Black 09/02/2020 >90  >60 mL/min/[1.73_m2] Final     Calcium 09/02/2020 9.7  8.6 - 10.3 mg/dL Final     WBC 09/02/2020 4.5  4.0 - 11.0 10e9/L Final     RBC Count 09/02/2020 5.41  4.4 - 5.9 10e12/L Final     Hemoglobin 09/02/2020 15.6  13.3 - 17.7 g/dL Final     Hematocrit 09/02/2020 46.4  40.0 - 53.0 % Final     MCV 09/02/2020 86  78 - 100 fl Final     MCH 09/02/2020 28.8  26.5 - 33.0 pg Final     MCHC 09/02/2020 33.6  31.5 - 36.5 g/dL Final     RDW 09/02/2020 11.9  10.0 - 15.0 % Final     Platelet Count 09/02/2020 157  150 - 450 10e9/L Final   Office Visit on 08/27/2020   Component Date Value Ref Range Status     Interpretation ECG 08/27/2020 Click View Image link to view waveform and result   Final   Office Visit on 08/18/2020   Component Date Value Ref Range Status     Hemoglobin A1C 08/18/2020 8.3* 4.0 - 6.0 % Final        Review of systems: Negative except that which was noted in the HPI.    Tests:   Coronary angiogram was reviewed. There is an 80% ostial ramus lesion. Stenting might impinge on the LCx and cause plaque shift requiring PCI of the LCx as well.    Nuclear stress test on 3/4/22 without ischemia    Assessment and Plan    # CAD and pre-op evaluation: Patient has atypical CP that only happens with very strenuous activity. His most recent nuclear stress test  did not show any ischemia in the ramus territory. He is also able to achieve >4 METS without CP. He has an upcoming cervical spine surgery that is being done for debilitating sx. Patient understands that he is at a higher risk of perioperative cardiac complications than a normal person, but given his symptoms, he can proceed with surgery without PCI.  - Recommend discontinuing plavix 5 days before the surgery.  - Recommend that patient stays on uninterrupted aspirin during the surgery. If the surgical team however does not think this is possible, aspirin can be stopped 5 days before the surgery and restarted on POD1.  - Surgery to be performed in a center with 24/7 cardiology backup.  - Continue other cardiac meds without any changes.    Thank you for this consultation.  Josefina Stafford,  PGY7 Interventional Cardiology    Patient was evaluated and assessment and plan was discussed with Dr.Raveendran Contreras is a 46 year old who is being evaluated via a billable video visit.      How would you like to obtain your AVS? Mail a copy  If the video visit is dropped, the invitation should be resent by: Text to cell phone: [phone]  Will anyone else be joining your video visit? No       Renetta Bennett, Visit Facilitator/MA.        [facility]

## 2022-04-07 NOTE — PROGRESS NOTES
Ben is a 46 year old who is being evaluated via a billable video visit.      How would you like to obtain your AVS? Mail a copy  If the video visit is dropped, the invitation should be resent by: Text to cell phone: [phone]  Will anyone else be joining your video visit? No       Renetta Bennett Visit Facilitator/MA.      Video Start Time: 12:01 PM  Video-Visit Details    Type of service:  Video Visit    Video End Time:12:30 PM    Originating Location (pt. Location): Home    Distant Location (provider location):  [facility] HEART [facility]     Platform used for Video Visit: [employer]

## 2022-04-12 ENCOUNTER — OFFICE VISIT (OUTPATIENT)
Dept: FAMILY MEDICINE | Facility: OTHER | Age: 47
End: 2022-04-12
Attending: FAMILY MEDICINE
Payer: COMMERCIAL

## 2022-04-12 VITALS
WEIGHT: 254.38 LBS | BODY MASS INDEX: 36.42 KG/M2 | DIASTOLIC BLOOD PRESSURE: 85 MMHG | SYSTOLIC BLOOD PRESSURE: 140 MMHG | HEIGHT: 70 IN | OXYGEN SATURATION: 98 % | HEART RATE: 67 BPM | RESPIRATION RATE: 20 BRPM | TEMPERATURE: 96.8 F

## 2022-04-12 DIAGNOSIS — I25.118 CORONARY ARTERY DISEASE OF NATIVE ARTERY OF NATIVE HEART WITH STABLE ANGINA PECTORIS (H): ICD-10-CM

## 2022-04-12 DIAGNOSIS — E78.2 MIXED HYPERLIPIDEMIA: ICD-10-CM

## 2022-04-12 DIAGNOSIS — I20.89 STABLE ANGINA (H): ICD-10-CM

## 2022-04-12 DIAGNOSIS — E11.9 TYPE 2 DIABETES MELLITUS WITHOUT COMPLICATION, WITHOUT LONG-TERM CURRENT USE OF INSULIN (H): ICD-10-CM

## 2022-04-12 DIAGNOSIS — Z01.818 PREOPERATIVE EXAMINATION: Primary | ICD-10-CM

## 2022-04-12 DIAGNOSIS — G47.33 OSA ON CPAP: ICD-10-CM

## 2022-04-12 DIAGNOSIS — E66.01 MORBID OBESITY (H): ICD-10-CM

## 2022-04-12 DIAGNOSIS — I10 ESSENTIAL HYPERTENSION: ICD-10-CM

## 2022-04-12 PROBLEM — G47.00 INSOMNIA, UNSPECIFIED TYPE: Status: RESOLVED | Noted: 2021-09-21 | Resolved: 2022-04-12

## 2022-04-12 PROBLEM — R53.83 FATIGUE, UNSPECIFIED TYPE: Status: RESOLVED | Noted: 2021-09-21 | Resolved: 2022-04-12

## 2022-04-12 LAB
ALBUMIN SERPL-MCNC: 4.4 G/DL (ref 3.5–5.7)
ALP SERPL-CCNC: 64 U/L (ref 34–104)
ALT SERPL W P-5'-P-CCNC: 18 U/L (ref 7–52)
ANION GAP SERPL CALCULATED.3IONS-SCNC: 7 MMOL/L (ref 3–14)
AST SERPL W P-5'-P-CCNC: 13 U/L (ref 13–39)
BILIRUB SERPL-MCNC: 0.8 MG/DL (ref 0.3–1)
BUN SERPL-MCNC: 23 MG/DL (ref 7–25)
CALCIUM SERPL-MCNC: 9.3 MG/DL (ref 8.6–10.3)
CHLORIDE BLD-SCNC: 102 MMOL/L (ref 98–107)
CO2 SERPL-SCNC: 27 MMOL/L (ref 21–31)
CREAT SERPL-MCNC: 1.15 MG/DL (ref 0.7–1.3)
GFR SERPL CREATININE-BSD FRML MDRD: 79 ML/MIN/1.73M2
GLUCOSE BLD-MCNC: 207 MG/DL (ref 70–105)
HBA1C MFR BLD: 8.2 % (ref 4–6.2)
HGB BLD-MCNC: 12.1 G/DL (ref 13.3–17.7)
POTASSIUM BLD-SCNC: 4 MMOL/L (ref 3.5–5.1)
PROT SERPL-MCNC: 7 G/DL (ref 6.4–8.9)
SODIUM SERPL-SCNC: 136 MMOL/L (ref 134–144)

## 2022-04-12 PROCEDURE — 83036 HEMOGLOBIN GLYCOSYLATED A1C: CPT | Mod: ZL | Performed by: FAMILY MEDICINE

## 2022-04-12 PROCEDURE — G0463 HOSPITAL OUTPT CLINIC VISIT: HCPCS

## 2022-04-12 PROCEDURE — 80053 COMPREHEN METABOLIC PANEL: CPT | Mod: ZL | Performed by: FAMILY MEDICINE

## 2022-04-12 PROCEDURE — 85018 HEMOGLOBIN: CPT | Mod: ZL | Performed by: FAMILY MEDICINE

## 2022-04-12 PROCEDURE — 99214 OFFICE O/P EST MOD 30 MIN: CPT | Performed by: FAMILY MEDICINE

## 2022-04-12 PROCEDURE — 36415 COLL VENOUS BLD VENIPUNCTURE: CPT | Mod: ZL | Performed by: FAMILY MEDICINE

## 2022-04-12 ASSESSMENT — ASTHMA QUESTIONNAIRES: ACT_TOTALSCORE: 24

## 2022-04-12 ASSESSMENT — PAIN SCALES - GENERAL: PAINLEVEL: EXTREME PAIN (8)

## 2022-04-12 NOTE — LETTER
April 12, 2022      Ben Figueroa  [address] [address]        Dear ,    We are writing to inform you of your test results.    Your diabetes testing shows worsening control when compared to 2 months ago.  I would encourage you to take your medications as prescribed as well as try to limit your sugar intake.  We should check this again in 3 months if it continues to worsen we will need to adjust your medications.    Your liver and kidney testing is stable.    Good luck with your upcoming procedure.    Resulted Orders   Comprehensive Metabolic Panel   Result Value Ref Range    Sodium 136 134 - 144 mmol/L    Potassium 4.0 3.5 - 5.1 mmol/L    Chloride 102 98 - 107 mmol/L    Carbon Dioxide (CO2) 27 21 - 31 mmol/L    Anion Gap 7 3 - 14 mmol/L    Urea Nitrogen 23 7 - 25 mg/dL    Creatinine 1.15 0.70 - 1.30 mg/dL    Calcium 9.3 8.6 - 10.3 mg/dL    Glucose 207 (H) 70 - 105 mg/dL    Alkaline Phosphatase 64 34 - 104 U/L    AST 13 13 - 39 U/L    ALT 18 7 - 52 U/L    Protein Total 7.0 6.4 - 8.9 g/dL    Albumin 4.4 3.5 - 5.7 g/dL    Bilirubin Total 0.8 0.3 - 1.0 mg/dL    GFR Estimate 79 >60 mL/min/1.73m2      Comment:      Effective December 21, 2021 eGFRcr in adults is calculated using the 2021 CKD-EPI creatinine equation which includes age and gender (John et al., NEJM, DOI: 10.1056/FMFWsg1267060)   Hemoglobin   Result Value Ref Range    Hemoglobin 12.1 (L) 13.3 - 17.7 g/dL   Hemoglobin A1c   Result Value Ref Range    Hemoglobin A1C 8.2 (H) 4.0 - 6.2 %       If you have any questions or concerns, please call the clinic at the number listed above.       Sincerely,      Sg Szymanski           hard copy, drawn during this pregnancy

## 2022-04-12 NOTE — PROGRESS NOTES
[facility] AND [facility]  [address]  [place] [address]  Phone: [phone]  Fax: [phone]  Primary Provider: Denise Reyes  Pre-op Performing Provider: DENISE REYES      PREOPERATIVE EVALUATION:  Today's date: 4/12/2022    Herb Figueroa is a 46 year old male who presents for a preoperative evaluation.    Surgical Information:  Surgery/Procedure: Cervical disc 5-6 fusion  Surgery Location: [facility]  Surgeon: Dr. Ram  Surgery Date: 04/27/22  Time of Surgery: TBD  Where patient plans to recover: At home with family  Fax number for surgical facility: Note does not need to be faxed, will be available electronically in Epic.    Type of Anesthesia Anticipated: to be determined    Assessment & Plan     The proposed surgical procedure is considered INTERMEDIATE risk.    Preoperative examination  Patient is currently optimized for his procedure.  He has been seen virtually by cardiology who has also optimized him.  He will stop his Plavix 1 week prior to his procedure and aspirin 4 days prior to his procedure if requested by surgery.  He can resume these after his procedure.  He will also need to stop meloxicam 1 week prior to his procedure.  The remainder of his medications will be continued as is.  I asked him to bring his CPAP in case he needs to spend the night in the hospital.    Coronary artery disease of native artery of native heart with stable angina pectoris (H)  His history includes catheterization on September 8, 2020 at [facility] with 2 stents to his LAD.  He had repeat cath at [facility] October 29, 2021 showing stable disease, 80% ramus lesion felt unable to be stented.  His most recent nuclear stress test did not show any ischemia in the ramus territory.  He is aware that he is at higher cardiovascular risk given his history of cardiovascular disease.  This is the reason why his procedure will be performed at [facility] where cardiology is available emergently  if needed.    Stable angina (H)  See above    SCOTT on CPAP  Stable on CPAP, I asked him to bring his CPAP machine to the hospital for his procedure.    Morbid obesity (H)  Chronic    Type 2 diabetes mellitus without complication, without long-term current use of insulin (H)  We will update hemoglobin A1c.  He has not been using Metformin as that has been causing GI side effects.  He does not check his blood sugars at home.  - Comprehensive Metabolic Panel; Future  - Hemoglobin; Future  - Hemoglobin A1c; Future    Mixed hyperlipidemia  Continue statin.    Essential hypertension  Continue current regimen.    RECOMMENDATION:  APPROVAL GIVEN to proceed with proposed procedure, without further diagnostic evaluation.      Subjective     HPI related to upcoming procedure: Cervical disc 5-6 fusion is planned for April 29.  This procedure has been planned for quite some time however given his continued atypical chest pain his procedure was delayed for continued cardiovascular work-up.  This has been completed and is felt that he is as optimized as he is good to be for his upcoming procedure.    Besides his neck he reports that he is otherwise been doing well and has had no further concerns.    Preop Questions 4/12/2022   1. Have you ever had a heart attack or stroke? No   2. Have you ever had surgery on your heart or blood vessels, such as a stent placement, a coronary artery bypass, or surgery on an artery in your head, neck, heart, or legs? YES - stent   3. Do you have chest pain with activity? UNKNOWN - only with over exertion   4. Do you have a history of  heart failure? No   5. Do you currently have a cold, bronchitis or symptoms of other infection? No   6. Do you have a cough, shortness of breath, or wheezing? No   7. Do you or anyone in your family have previous history of blood clots? Mom-DVT, carotid artery stenosis   8. Do you or does anyone in your family have a serious bleeding problem such as prolonged bleeding  following surgeries or cuts? No   9. Have you ever had problems with anemia or been told to take iron pills? No   10. Have you had any abnormal blood loss such as black, tarry or bloody stools? History of rectal bleeding, none recently   11. Have you ever had a blood transfusion? No   12. Are you willing to have a blood transfusion if it is medically needed before, during, or after your surgery? Yes   13. Have you or any of your relatives ever had problems with anesthesia? No   14. Do you have sleep apnea, excessive snoring or daytime drowsiness? YES - uses CPAP   14a. Do you have a CPAP machine? Yes   15. Do you have any artifical heart valves or other implanted medical devices like a pacemaker, defibrillator, or continuous glucose monitor? No   16. Do you have artificial joints? No   17. Are you allergic to latex? No       Health Care Directive:  Patient does not have a Health Care Directive or Living Will: Discussed advance care planning with patient; however, patient declined at this time.    Preoperative Review of :   reviewed - controlled substances prescribed by other outside provider(s).        Review of Systems  CONSTITUTIONAL: NEGATIVE for fever, chills, change in weight  INTEGUMENTARY/SKIN: NEGATIVE for worrisome rashes, moles or lesions  EYES: NEGATIVE for vision changes or irritation  ENT/MOUTH: NEGATIVE for ear, mouth and throat problems  RESP: NEGATIVE for significant cough or SOB  CV: NEGATIVE for chest pain, palpitations or peripheral edema  GI: NEGATIVE for nausea, abdominal pain, heartburn, or change in bowel habits  : NEGATIVE for frequency, dysuria, or hematuria  MUSCULOSKELETAL: NEGATIVE for significant arthralgias or myalgia  NEURO: NEGATIVE for weakness, dizziness or paresthesias  ENDOCRINE: NEGATIVE for temperature intolerance, skin/hair changes  HEME: NEGATIVE for bleeding problems  PSYCHIATRIC: NEGATIVE for changes in mood or affect    Patient Active Problem List    Diagnosis Date  Noted     Status post insertion of drug-eluting stent into left anterior descending (LAD) artery x2 (9/8/2020) 03/02/2022     Priority: Medium     Status post coronary angiogram 10/29/2021     Priority: Medium     Coronary artery disease of native artery of native heart with stable angina pectoris (H) 09/21/2021     Priority: Medium     Stable angina (H) 09/21/2021     Priority: Medium     S/P cardiac cath at [facility] on 9/8/2020 09/24/2020     Priority: Medium     History of coronary artery stent placement x2 to the LAD on 9/8/2020 at [facility] 09/24/2020     Priority: Medium     Abnormal stress test on 8/24/20 and 9/27/2021 08/27/2020     Priority: Medium     Coronary artery disease involving native coronary artery of native heart without angina pectoris 08/27/2020     Priority: Medium     Mixed hyperlipidemia 08/27/2020     Priority: Medium     Gastroesophageal reflux disease without esophagitis 08/27/2020     Priority: Medium     Mild asthma  08/27/2020     Priority: Medium     History of tobacco abuse quitting in approximately 2013 at a half a pack a day 08/27/2020     Priority: Medium     Pain of both wrist joints 02/14/2019     Priority: Medium     Type 2 diabetes mellitus without complication, without long-term current use of insulin (H) 04/20/2018     Priority: Medium     Morbid obesity (H) 04/20/2018     Priority: Medium     Pain in thoracic spine 01/17/2018     Priority: Medium     Degeneration of thoracic or thoracolumbar intervertebral disc 01/17/2018     Priority: Medium     Overview:   Thoracic degenerative spine disease/juvenile disc disease-Dr. Samir Acosta       Gastritis 01/17/2018     Priority: Medium     Overview:   with dysphagia       Scoliosis (and kyphoscoliosis), idiopathic 01/17/2018     Priority: Medium     Overview:   Dr.Scott Acosta  Replacing diagnoses that were inactivated after the 10/1/2021 regulatory import.       SCOTT on CPAP 10/15/2015     Priority: Medium     Overview:    Moderate, sleep study        Suicidal ideation 07/30/2013     Priority: Medium     Depression, major 08/05/2010     Priority: Medium     Carpal tunnel syndrome, right 03/08/2010     Priority: Medium     Essential hypertension 01/27/2010     Priority: Medium     Gout 10/01/2005     Priority: Medium     Overview:   left great toe        Past Medical History:   Diagnosis Date     Encounter for screening for cardiovascular disorders     02-11, Negative stress test     Essential (primary) hypertension     No Comments Provided     Gastro-esophageal reflux disease without esophagitis     No Comments Provided     Major depressive disorder, single episode     After the death of his daughter in MVA     Mild intermittent asthma, uncomplicated     No Comments Provided     Nicotine dependence, uncomplicated     age [age], 1-2 packs per day, Quit Aug 2005     Suicidal ideations     2013,Hospitalized in [place]     Past Surgical History:   Procedure Laterality Date     CV CORONARY ANGIOGRAM N/A 10/29/2021    Procedure: CV CORONARY ANGIOGRAM;  Surgeon: Juan Hdz MD;  Location:  [facility]     LAPAROSCOPIC CHOLECYSTECTOMY      11-05,Dr. Dunne     TYMPANOSTOMY, LOCAL/TOPICAL ANESTHESIA      No Comments Provided     Current Outpatient Medications   Medication Sig Dispense Refill     amLODIPine (NORVASC) 5 MG tablet Take 1 tablet (5 mg) by mouth daily 90 tablet 1     aspirin (ASA) 81 MG chewable tablet TAKE 1 TABLET (81 MG) BY MOUTH DAILY 90 tablet 3     blood glucose (NO BRAND SPECIFIED) test strip Use to test blood sugar 1 times daily. 100 each 11     blood glucose monitoring (ACCU-CHEK LILIANA PLUS) meter device kit NEEDS METER,STRIPS,LANCETS 1 kit 1     blood glucose monitoring (ACCU-CHEK MULTICLIX) lancets Use to test blood sugar 1 times daily. 102 each 11     carvedilol (COREG) 12.5 MG tablet Take 2 tablets (25 mg) by mouth 2 times daily (with meals) 180 tablet 3     citalopram (CELEXA) 20 MG  "tablet TAKE 1 TABLET (20 MG) BY MOUTH DAILY 90 tablet 3     clopidogrel (PLAVIX) 75 MG tablet Take 1 tablet (75 mg) by mouth daily 90 tablet 3     gabapentin (NEURONTIN) 300 MG capsule TAKE 2 CAPSULES BY MOUTH THREE TIMES DAILY 180 capsule 0     glipiZIDE (GLUCOTROL XL) 10 MG 24 hr tablet TAKE 1 TAB BY MOUTH ONCE DAILY 90 tablet 3     hydrochlorothiazide (HYDRODIURIL) 25 MG tablet TAKE 1 TABLET (25 MG) BY MOUTH DAILY 90 tablet 3     isosorbide mononitrate (IMDUR) 60 MG 24 hr tablet Take 1 tablet (60 mg) by mouth daily 90 tablet 3     lisinopril (ZESTRIL) 40 MG tablet TAKE 1 TAB BY MOUTH ONCE DAILY 90 tablet 3     meloxicam (MOBIC) 15 MG tablet TAKE 1 TAB BY MOUTH ONCE DAILY 90 tablet 3     metFORMIN (GLUCOPHAGE) 1000 MG tablet TAKE 1 TABLET BY MOUTH TWICE A DAY WITH MEALS (Patient taking differently: Not currently taking due to nausea) 180 tablet 3     nitroGLYcerin (NITROSTAT) 0.4 MG sublingual tablet For chest pain place 1 tablet under the tongue every 5 minutes for 3 doses. If symptoms persist 5 minutes after 1st dose call 911. 25 tablet 3     omeprazole (PRILOSEC) 40 MG DR capsule TAKE 1 CAPSULE BY MOUTH ONCE DAILY WITH FOOD 90 capsule 3     rosuvastatin (CRESTOR) 40 MG tablet Take 1 tablet (40 mg) by mouth daily 90 tablet 3       No Known Allergies     Social History     Tobacco Use     Smoking status: Former Smoker     Packs/day: 0.00     Years: 20.00     Pack years: 0.00     Types: Cigarettes     Quit date: 2013     Years since quittin.7     Smokeless tobacco: Never Used   Substance Use Topics     Alcohol use: Yes     Comment: Alcoholic Drinks/day: occasionally-rare 4 tomes a year         Objective     BP (!) 140/85   Pulse 67   Temp 96.8  F (36  C) (Tympanic)   Resp 20   Ht 1.778 m (5' 10\")   Wt 115.4 kg (254 lb 6 oz)   SpO2 98%   BMI 36.50 kg/m      Physical Exam    GENERAL APPEARANCE: healthy, alert and no distress     EYES: EOMI,  PERRL     RESP: lungs clear to auscultation - no rales, " rhonchi or wheezes     CV: regular rates and rhythm, normal S1 S2, no S3 or S4 and no murmur, click or rub     ABDOMEN:  soft, nontender, no HSM or masses and bowel sounds normal     MS: extremities normal- no gross deformities noted, no evidence of inflammation in joints, FROM in all extremities.     SKIN: no suspicious lesions or rashes     NEURO: Normal strength and tone, sensory exam grossly normal, mentation intact and speech normal     PSYCH: mentation appears normal. and affect normal/bright     LYMPHATICS: No cervical adenopathy    Recent Labs   Lab Test 02/25/22  0936 10/29/21  1134 11/20/20  0831 09/24/20  1500   HGB 14.8 14.3  --  13.7   PLT  --  327  --  166    134  --  138   POTASSIUM 4.6 4.8  --  3.7   CR 1.23 0.94  --  0.92   A1C 7.9*  --  6.4*  --         Diagnostics:  Labs pending at this time.  Results will be reviewed when available.   Cardiovascular stress testing as discussed above.    Revised Cardiac Risk Index (RCRI):  The patient has the following serious cardiovascular risks for perioperative complications:   - Coronary Artery Disease (MI, positive stress test, angina, Qs on EKG) = 1 point     RCRI Interpretation: 1 point: Class II (low risk - 0.9% complication rate)           Signed Electronically by: Sg Szymanski  Copy of this evaluation report is provided to requesting physician.

## 2022-04-21 ENCOUNTER — TELEPHONE (OUTPATIENT)
Dept: CARDIOLOGY | Facility: OTHER | Age: 47
End: 2022-04-21
Payer: COMMERCIAL

## 2022-04-21 NOTE — TELEPHONE ENCOUNTER
Called patient and gave verification for medication during surgery. Had no further questions or concerns.  .Taras Pinto LPN on 4/21/2022 at 3:55 PM    ...Taras Pinto LPN on 4/21/2022 at 3:55 PM

## 2022-04-21 NOTE — TELEPHONE ENCOUNTER
"Per note from 4/7/22 virtual visit  \"Josefina Stafford,  PGY7 Interventional Cardiology  Patient was evaluated and assessment and plan was discussed with \"    \"Patient understands that he is at a higher risk of perioperative cardiac complications than a normal person, but given his symptoms, he can proceed with surgery without PCI.  - Recommend discontinuing plavix 5 days before the surgery.  - Recommend that patient stays on uninterrupted aspirin during the surgery. If the surgical team however does not think this is possible, aspirin can be stopped 5 days before the surgery and restarted on POD1.\"    To MIGUEL Colunga CNP to advise.  Em Haywood RN......April 21, 2022...3:13 PM   "

## 2022-04-21 NOTE — TELEPHONE ENCOUNTER
Patient is having surgery on 04/27/2022.  He was told to stop taking aspirin 4 days before surgery by Dr. Szymanski.  Anesthesiologist states to stay on the aspirin.  Patient was told to check with his cardiologist to ensure it is okay to continue to take the aspirin.  Peg Oliveros on 4/21/2022 at 1:54 PM

## 2022-04-27 ENCOUNTER — TRANSFERRED RECORDS (OUTPATIENT)
Dept: HEALTH INFORMATION MANAGEMENT | Facility: OTHER | Age: 47
End: 2022-04-27
Payer: COMMERCIAL

## 2022-05-14 DIAGNOSIS — Z98.1 S/P CERVICAL SPINAL FUSION: Primary | ICD-10-CM

## 2022-05-17 ENCOUNTER — HOSPITAL ENCOUNTER (OUTPATIENT)
Dept: GENERAL RADIOLOGY | Facility: OTHER | Age: 47
Discharge: HOME | End: 2022-05-17
Attending: STUDENT IN AN ORGANIZED HEALTH CARE EDUCATION/TRAINING PROGRAM
Payer: COMMERCIAL

## 2022-05-17 ENCOUNTER — OFFICE VISIT (OUTPATIENT)
Dept: ORTHOPEDICS | Facility: OTHER | Age: 47
End: 2022-05-17
Attending: STUDENT IN AN ORGANIZED HEALTH CARE EDUCATION/TRAINING PROGRAM
Payer: COMMERCIAL

## 2022-05-17 DIAGNOSIS — Z98.1 S/P CERVICAL SPINAL FUSION: ICD-10-CM

## 2022-05-17 DIAGNOSIS — Z98.1 S/P CERVICAL SPINAL FUSION: Primary | ICD-10-CM

## 2022-05-17 PROCEDURE — 72040 X-RAY EXAM NECK SPINE 2-3 VW: CPT

## 2022-05-17 PROCEDURE — G0463 HOSPITAL OUTPT CLINIC VISIT: HCPCS

## 2022-05-17 PROCEDURE — 99024 POSTOP FOLLOW-UP VISIT: CPT | Performed by: STUDENT IN AN ORGANIZED HEALTH CARE EDUCATION/TRAINING PROGRAM

## 2022-05-17 NOTE — PROGRESS NOTES
Patient is here for follow up on his cervical spine.  Gay Mckeon LPN .....................5/17/2022 11:28 AM

## 2022-05-18 NOTE — PROGRESS NOTES
Visit Date: 2022    HISTORY OF PRESENT ILLNESS:  Ben is a 46-year-old male who is approximately 2 weeks out from a C5-C6 anterior cervical discectomy and fusion for cervical myelopathy with radiculopathy.  He has done very well in the early postoperative period with considerable improvement in his arm pain, with the expected amount of ongoing neck pain.    PHYSICAL EXAMINATION:    GENERAL:  Well-appearing, well-nourished.  MUSCULOSKELETAL/NEUROLOGIC:  He has 5/5 strength in bilateral deltoid, biceps, triceps, wrist extension and flexion, hand  and hand intrinsics.  He has normal sensation to light touch throughout bilateral upper extremities.  He has some mild range of motion limitations of his left deltoid, but is able to extend his arm almost completely above his head when pushing through his trapezius pain.    IMAGING:  Imaging available for review today includes AP and lateral radiographs of the cervical spine, which shows adequate positioning of his hardware.  No evidence of early hardware failure.    ASSESSMENT AND PLAN:  Herb is a 46-year-old male 2 weeks out from a C5-C6 anterior cervical discectomy and fusion.  He will begin his physical therapy here at [facility] and return to clinic in 1 month for routine evaluation.    This clinic visit took 15-29 minutes.    Marcial Ram MD        D: 2022   T: 2022   MT: LETICIA    Name:     HERB GILLIS  MRN:      [id number]        Account:    [id number]   :      1975           Visit Date: 2022     Document: [id number]

## 2022-06-08 ENCOUNTER — HOSPITAL ENCOUNTER (OUTPATIENT)
Dept: PHYSICAL THERAPY | Facility: OTHER | Age: 47
Setting detail: THERAPIES SERIES
Discharge: HOME | End: 2022-06-08
Attending: STUDENT IN AN ORGANIZED HEALTH CARE EDUCATION/TRAINING PROGRAM
Payer: COMMERCIAL

## 2022-06-08 DIAGNOSIS — Z98.1 S/P CERVICAL SPINAL FUSION: ICD-10-CM

## 2022-06-08 PROCEDURE — 97110 THERAPEUTIC EXERCISES: CPT | Mod: GP | Performed by: PHYSICAL THERAPIST

## 2022-06-08 PROCEDURE — 97010 HOT OR COLD PACKS THERAPY: CPT | Mod: GP | Performed by: PHYSICAL THERAPIST

## 2022-06-08 NOTE — PROGRESS NOTES
[facility]    OUTPATIENT PHYSICAL THERAPY ORTHOPEDIC EVALUATION  PLAN OF TREATMENT FOR OUTPATIENT REHABILITATION  (COMPLETE FOR INITIAL CLAIMS ONLY)  Patient's Last Name, First Name, M.I.  YOB: 1975  Herb Figueroa    Provider s Name:  [facility]   Medical Record No.  [id number]   Start of Care Date:  06/08/22   Onset Date:      Type:     _X__PT   ___OT   ___SLP Medical Diagnosis:  S/P cervical spinal fusion (Z98.1)     PT Diagnosis:  decread ROM, strength and neural pain/tension s/p cervical fusion.   Visits from SOC:  1      _________________________________________________________________________________  Plan of Treatment/Functional Goals:  joint mobilization, manual therapy, ROM, strengthening, stretching     Electrical stimulation, Hot packs, Cryotherapy     Goals  Goal Identifier: ROM  Goal Description: Patient will have full and equal range of motion of rotation and sidebending to better tolerate driving and sleeping positions  Target Date: 07/29/22    Goal Identifier: Home exercise program  Goal Description: Patient will maintain home exercise program at least 3 times a week to improve range of motion and strength and decrease pain  Target Date: 07/29/22    Goal Identifier: Strength  Goal Description: Shoulder strength improved to 4+ to better tolerate work tasks and reduce strain on neck when lifting arm.  Target Date: 07/29/22         Therapy Frequency:     Predicted Duration of Therapy Intervention:  1-2x/wk 4-8 weeks.    Jarek Singh, PT                 I CERTIFY THE NEED FOR THESE SERVICES FURNISHED UNDER        THIS PLAN OF TREATMENT AND WHILE UNDER MY CARE     (Physician co-signature of this document indicates review and certification of the therapy plan).                     Certification Date From:  06/08/22   Certification Date To:   08/05/22    Referring Provider:  Marcial Ram    Initial Assessment        See Epic Evaluation Start of Care Date: 06/08/22

## 2022-06-08 NOTE — INITIAL ASSESSMENTS
06/08/22 0900   General Information   Type of Visit Initial OP Ortho PT Evaluation   Start of Care Date 06/08/22   Referring Physician Marcial Ram   Patient/Family Goals Statement return to work, play darts,   Orders Evaluate and Treat   Certification Required? Yes   Medical Diagnosis S/P cervical spinal fusion (Z98.1)   Surgical/Medical history reviewed Yes   Body Part(s)   Body Part(s) Cervical Spine   Presentation and Etiology   Pertinent history of current problem (include personal factors and/or comorbidities that impact the POC) Patient presents to physical therapy with neck pain.  He had some chronic neck pain that was treated with chiropractor in the past and then found to have a bulging disc then he was treated surgically by Dr. Ram with a cervical fusion anterior approach.  Surgery was on 4/27/2022.  Pain continues to bother him between 2 and 9 out of 10 with a sharp throbbing and aching pain that also shoots down his shoulder into his hand.  He does have some numbness and tingling in his hand as well as weakness in the shoulder.  He states that the surgical outcome was better the first 2 weeks but now after about a month it has started to return with his radicular symptoms.  Pain is improved with rest heating pad and Tylenol.  Prior to surgery he was taking a lot of ibuprofen with little help.  Patient works as a  and folder maker for a local manufacturer.  Hobbies include throwing darts fishing traveling and playing with grandchildren.  Not doing any specific home exercise program at this time.  He is to follow-up with his surgeon currently on 7/17/2022 but would like to have that sooner.   Fall Risk Screen   Fall screen completed by PT   Is patient a fall risk? No   Abuse Screen (yes response referral indicated)   Feels Unsafe at Home or Work/School no   Feels Threatened by Someone no   Does Anyone Try to Keep You From Having Contact with Others or Doing Things Outside Your Home? no    Physical Signs of Abuse Present no   Patient needs abuse support services and resources No   Cervical Spine   Cervical Left Side Bending ROM 30   Cervical Right Rotation ROM 65   Cervical Left Rotation ROM 75   Cervical Right Side Bending ROM 40   Thoracic Extension ROM Moderately tight   Shoulder AROM Screen Full but sore on the left   Shoulder/Wrist/Hand Strength Comments 4-5 left shoulder strength   Upper Trapezius Flexibility Mildly tight   Vertebral Artery Test Negative   Alar Ligament Test Negative   Transverse Ligament Test Negative   Palpation Very tight upper trap and levator   UE Neural Tension   (Tension and tingling increase with median nerve)   Observation Patient sits with very rounded shoulders.  Incision is well-healed.  Has good cervical rotation range of motion.   Planned Therapy Interventions   Planned Therapy Interventions joint mobilization;manual therapy;ROM;strengthening;stretching   Planned Modality Interventions   Planned Modality Interventions Electrical stimulation;Hot packs;Cryotherapy   Clinical Impression   Criteria for Skilled Therapeutic Interventions Met yes, treatment indicated   PT Diagnosis decread ROM, strength and neural pain/tension s/p cervical fusion.   Influenced by the following impairments pain, ROM tolerance and weakness   Functional limitations due to impairments limits hobbies and work tasks   Clinical Presentation Stable/Uncomplicated   Clinical Decision Making (Complexity) Low complexity   Predicted Duration of Therapy Intervention (days/wks) 1-2x/wk 4-8 weeks.   Risk & Benefits of therapy have been explained Yes   Patient, Family & other staff in agreement with plan of care Yes   Clinical Impression Comments PT will help patine with Home program and with in clinic manual therapies and ther Ex to impoved ROM and strength, as well as decrease pain and neural issues.   ORTHO GOALS   PT Ortho Eval Goals 1;2;3   Ortho Goal 1   Goal Identifier ROM   Goal Description  Patient will have full and equal range of motion of rotation and sidebending to better tolerate driving and sleeping positions   Target Date 07/29/22   Ortho Goal 2   Goal Identifier Home exercise program   Goal Description Patient will maintain home exercise program at least 3 times a week to improve range of motion and strength and decrease pain   Target Date 07/29/22   Ortho Goal 3   Goal Identifier Strength   Goal Description Shoulder strength improved to 4+ to better tolerate work tasks and reduce strain on neck when lifting arm.   Target Date 07/29/22   Total Evaluation Time   PT Eval, Low Complexity Minutes (27432) 30   Therapy Certification   Certification date from 06/08/22   Certification date to 08/05/22   Medical Diagnosis S/P cervical spinal fusion (Z98.1)

## 2022-06-14 ENCOUNTER — HOSPITAL ENCOUNTER (OUTPATIENT)
Dept: PHYSICAL THERAPY | Facility: OTHER | Age: 47
Setting detail: THERAPIES SERIES
Discharge: HOME | End: 2022-06-14
Attending: STUDENT IN AN ORGANIZED HEALTH CARE EDUCATION/TRAINING PROGRAM
Payer: COMMERCIAL

## 2022-06-14 PROCEDURE — 97110 THERAPEUTIC EXERCISES: CPT | Mod: GP | Performed by: PHYSICAL THERAPIST

## 2022-06-14 PROCEDURE — 97140 MANUAL THERAPY 1/> REGIONS: CPT | Mod: GP | Performed by: PHYSICAL THERAPIST

## 2022-06-17 ENCOUNTER — HOSPITAL ENCOUNTER (OUTPATIENT)
Dept: PHYSICAL THERAPY | Facility: OTHER | Age: 47
Setting detail: THERAPIES SERIES
Discharge: HOME | End: 2022-06-17
Attending: STUDENT IN AN ORGANIZED HEALTH CARE EDUCATION/TRAINING PROGRAM
Payer: COMMERCIAL

## 2022-06-17 PROCEDURE — 97110 THERAPEUTIC EXERCISES: CPT | Mod: GP | Performed by: PHYSICAL THERAPIST

## 2022-06-17 PROCEDURE — 97140 MANUAL THERAPY 1/> REGIONS: CPT | Mod: GP | Performed by: PHYSICAL THERAPIST

## 2022-06-21 ENCOUNTER — HOSPITAL ENCOUNTER (OUTPATIENT)
Dept: PHYSICAL THERAPY | Facility: OTHER | Age: 47
Setting detail: THERAPIES SERIES
Discharge: HOME | End: 2022-06-21
Attending: STUDENT IN AN ORGANIZED HEALTH CARE EDUCATION/TRAINING PROGRAM
Payer: COMMERCIAL

## 2022-06-21 PROCEDURE — 97140 MANUAL THERAPY 1/> REGIONS: CPT | Mod: GP | Performed by: PHYSICAL THERAPIST

## 2022-06-21 PROCEDURE — 97110 THERAPEUTIC EXERCISES: CPT | Mod: GP | Performed by: PHYSICAL THERAPIST

## 2022-06-23 ENCOUNTER — HOSPITAL ENCOUNTER (OUTPATIENT)
Dept: PHYSICAL THERAPY | Facility: OTHER | Age: 47
Setting detail: THERAPIES SERIES
Discharge: HOME | End: 2022-06-23
Attending: STUDENT IN AN ORGANIZED HEALTH CARE EDUCATION/TRAINING PROGRAM
Payer: COMMERCIAL

## 2022-06-23 PROCEDURE — 97110 THERAPEUTIC EXERCISES: CPT | Mod: GP

## 2022-06-23 PROCEDURE — 97140 MANUAL THERAPY 1/> REGIONS: CPT | Mod: GP

## 2022-06-29 ENCOUNTER — HOSPITAL ENCOUNTER (OUTPATIENT)
Dept: PHYSICAL THERAPY | Facility: OTHER | Age: 47
Setting detail: THERAPIES SERIES
Discharge: HOME | End: 2022-06-29
Attending: STUDENT IN AN ORGANIZED HEALTH CARE EDUCATION/TRAINING PROGRAM
Payer: COMMERCIAL

## 2022-06-29 PROCEDURE — 97140 MANUAL THERAPY 1/> REGIONS: CPT | Mod: GP | Performed by: PHYSICAL THERAPIST

## 2022-06-29 PROCEDURE — 97110 THERAPEUTIC EXERCISES: CPT | Mod: GP | Performed by: PHYSICAL THERAPIST

## 2022-07-14 ENCOUNTER — HOSPITAL ENCOUNTER (OUTPATIENT)
Dept: PHYSICAL THERAPY | Facility: OTHER | Age: 47
Setting detail: THERAPIES SERIES
Discharge: HOME | End: 2022-07-14
Attending: STUDENT IN AN ORGANIZED HEALTH CARE EDUCATION/TRAINING PROGRAM
Payer: COMMERCIAL

## 2022-07-14 PROCEDURE — 97140 MANUAL THERAPY 1/> REGIONS: CPT | Mod: GP | Performed by: PHYSICAL THERAPIST

## 2022-07-14 PROCEDURE — 97110 THERAPEUTIC EXERCISES: CPT | Mod: GP | Performed by: PHYSICAL THERAPIST

## 2022-07-15 DIAGNOSIS — Z98.1 S/P CERVICAL SPINAL FUSION: Primary | ICD-10-CM

## 2022-07-19 ENCOUNTER — HOSPITAL ENCOUNTER (OUTPATIENT)
Dept: GENERAL RADIOLOGY | Facility: OTHER | Age: 47
Discharge: HOME | End: 2022-07-19
Attending: STUDENT IN AN ORGANIZED HEALTH CARE EDUCATION/TRAINING PROGRAM
Payer: COMMERCIAL

## 2022-07-19 ENCOUNTER — OFFICE VISIT (OUTPATIENT)
Dept: ORTHOPEDICS | Facility: OTHER | Age: 47
End: 2022-07-19
Attending: STUDENT IN AN ORGANIZED HEALTH CARE EDUCATION/TRAINING PROGRAM
Payer: COMMERCIAL

## 2022-07-19 DIAGNOSIS — Z98.1 S/P CERVICAL SPINAL FUSION: ICD-10-CM

## 2022-07-19 DIAGNOSIS — Z98.1 S/P CERVICAL SPINAL FUSION: Primary | ICD-10-CM

## 2022-07-19 PROCEDURE — 72040 X-RAY EXAM NECK SPINE 2-3 VW: CPT

## 2022-07-19 PROCEDURE — 99213 OFFICE O/P EST LOW 20 MIN: CPT | Performed by: STUDENT IN AN ORGANIZED HEALTH CARE EDUCATION/TRAINING PROGRAM

## 2022-07-19 PROCEDURE — G0463 HOSPITAL OUTPT CLINIC VISIT: HCPCS | Mod: 25 | Performed by: STUDENT IN AN ORGANIZED HEALTH CARE EDUCATION/TRAINING PROGRAM

## 2022-07-19 NOTE — NURSING NOTE
"Chief Complaint   Patient presents with     RECHECK     Spine Recheck        Pt is here today for spine pain recheck.    Noemi Garcia LPN on 7/19/2022 at 10:41 AM       Initial There were no vitals taken for this visit. Estimated body mass index is 36.5 kg/m  as calculated from the following:    Height as of 4/12/22: 1.778 m (5' 10\").    Weight as of 4/12/22: 115.4 kg (254 lb 6 oz).  Medication Reconciliation: complete    Noemi Garcia LPN  "
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
## 2022-07-24 ENCOUNTER — HOSPITAL ENCOUNTER (OUTPATIENT)
Dept: MRI IMAGING | Facility: OTHER | Age: 47
Discharge: HOME | End: 2022-07-24
Attending: STUDENT IN AN ORGANIZED HEALTH CARE EDUCATION/TRAINING PROGRAM | Admitting: STUDENT IN AN ORGANIZED HEALTH CARE EDUCATION/TRAINING PROGRAM
Payer: COMMERCIAL

## 2022-07-24 DIAGNOSIS — Z98.1 S/P CERVICAL SPINAL FUSION: ICD-10-CM

## 2022-07-24 PROCEDURE — 72141 MRI NECK SPINE W/O DYE: CPT

## 2022-08-25 ENCOUNTER — TELEPHONE (OUTPATIENT)
Dept: CARDIOLOGY | Facility: OTHER | Age: 47
End: 2022-08-25

## 2022-08-25 NOTE — TELEPHONE ENCOUNTER
Patient states that he has an injection on 9/6/22.  He was told that he needs to be off of his aspirin 81mg 1 week prior and his plavix 3-5 days prior.  Patient wants to know if this is OK to do.  To Sg Ortiz, DO to address.  Em Haywood RN......August 25, 2022...3:24 PM

## 2022-08-25 NOTE — TELEPHONE ENCOUNTER
Patient is requesting a call back from Dr. Ortiz. He is needing clearance for an upcoming injection scheduled for 9/6/22. He is requesting a call back sometime after 2:30PM      Jayshree Camarena on 8/25/2022 at 10:07 AM

## 2022-08-25 NOTE — TELEPHONE ENCOUNTER
"Per Sg Ortiz, DO: \"Ideally, he should stay on aspirin but if cannot remain on aspirin, okay to discontinue aspirin/Plavix 5 days prior to planned surgery.     Dr. Ortiz\"    Patient notified of this.  Em Haywood RN......August 25, 2022...3:38 PM   "

## 2022-09-06 ENCOUNTER — HOSPITAL ENCOUNTER (OUTPATIENT)
Dept: GENERAL RADIOLOGY | Facility: OTHER | Age: 47
Discharge: HOME | End: 2022-09-06
Attending: STUDENT IN AN ORGANIZED HEALTH CARE EDUCATION/TRAINING PROGRAM | Admitting: STUDENT IN AN ORGANIZED HEALTH CARE EDUCATION/TRAINING PROGRAM
Payer: COMMERCIAL

## 2022-09-06 DIAGNOSIS — Z98.1 S/P CERVICAL SPINAL FUSION: ICD-10-CM

## 2022-09-06 PROCEDURE — 62321 NJX INTERLAMINAR CRV/THRC: CPT

## 2022-09-06 PROCEDURE — 250N000009 HC RX 250: Performed by: RADIOLOGY

## 2022-09-06 PROCEDURE — 255N000002 HC RX 255 OP 636: Performed by: RADIOLOGY

## 2022-09-06 PROCEDURE — 250N000011 HC RX IP 250 OP 636: Performed by: RADIOLOGY

## 2022-09-06 RX ORDER — LIDOCAINE HYDROCHLORIDE 10 MG/ML
1 INJECTION, SOLUTION INFILTRATION; PERINEURAL ONCE
Status: COMPLETED | OUTPATIENT
Start: 2022-09-06 | End: 2022-09-06

## 2022-09-06 RX ORDER — BETAMETHASONE SODIUM PHOSPHATE AND BETAMETHASONE ACETATE 3; 3 MG/ML; MG/ML
2 INJECTION, SUSPENSION INTRA-ARTICULAR; INTRALESIONAL; INTRAMUSCULAR; SOFT TISSUE ONCE
Status: COMPLETED | OUTPATIENT
Start: 2022-09-06 | End: 2022-09-06

## 2022-09-06 RX ADMIN — BETAMETHASONE SODIUM PHOSPHATE AND BETAMETHASONE ACETATE 2 ML: 3; 3 INJECTION, SUSPENSION INTRA-ARTICULAR; INTRALESIONAL; INTRAMUSCULAR at 10:23

## 2022-09-06 RX ADMIN — LIDOCAINE HYDROCHLORIDE 1 ML: 10 INJECTION, SOLUTION INFILTRATION; PERINEURAL at 10:23

## 2022-09-06 RX ADMIN — IOHEXOL 2 ML: 240 INJECTION, SOLUTION INTRATHECAL; INTRAVASCULAR; INTRAVENOUS; ORAL at 10:22

## 2022-09-11 DIAGNOSIS — I10 ESSENTIAL HYPERTENSION: ICD-10-CM

## 2022-09-11 DIAGNOSIS — I25.119 CORONARY ARTERY DISEASE INVOLVING NATIVE CORONARY ARTERY OF NATIVE HEART WITH ANGINA PECTORIS (H): ICD-10-CM

## 2022-09-11 DIAGNOSIS — Z95.5 STATUS POST INSERTION OF DRUG-ELUTING STENT INTO LEFT ANTERIOR DESCENDING (LAD) ARTERY: ICD-10-CM

## 2022-09-12 DIAGNOSIS — I10 ESSENTIAL HYPERTENSION: ICD-10-CM

## 2022-09-12 RX ORDER — HYDROCHLOROTHIAZIDE 25 MG/1
25 TABLET ORAL DAILY
Qty: 90 TABLET | Refills: 3 | Status: SHIPPED | OUTPATIENT
Start: 2022-09-12 | End: 2022-09-22

## 2022-09-12 RX ORDER — AMLODIPINE BESYLATE 5 MG/1
5 TABLET ORAL DAILY
Qty: 90 TABLET | Refills: 3 | Status: SHIPPED | OUTPATIENT
Start: 2022-09-12 | End: 2022-09-22

## 2022-09-12 NOTE — TELEPHONE ENCOUNTER
"Requested Prescriptions   Pending Prescriptions Disp Refills     amLODIPine (NORVASC) 5 MG tablet [Pharmacy Med Name: AMLODIPINE 5MG TABLET] 90 tablet 1     Sig: TAKE 1 TABLET (5 MG) BY MOUTH DAILY       Calcium Channel Blockers Protocol  Failed - 9/12/2022  8:17 AM        Failed - Blood pressure under 140/90 in past 12 months     BP Readings from Last 3 Encounters:   04/12/22 (!) 140/85   03/02/22 (!) 170/110   02/25/22 138/82                 Passed - Recent (12 mo) or future (30 days) visit within the authorizing provider's specialty     Patient has had an office visit with the authorizing provider or a provider within the authorizing providers department within the previous 12 mos or has a future within next 30 days. See \"Patient Info\" tab in inbasket, or \"Choose Columns\" in Meds & Orders section of the refill encounter.              Passed - Medication is active on med list        Passed - Patient is age 18 or older        Passed - Normal serum creatinine on file in past 12 months     Recent Labs   Lab Test 04/12/22  1126   CR 1.15       Ok to refill medication if creatinine is low             Last Written Prescription Date:  3/2/22  Last Fill Quantity: 90,   # refills: 1  Last Office Visit was with MIGUEL Colunga CNP: 3/2/22  Last office visit with Sg Ortiz, DO: 11/23/21  Future Office visit:    Next 5 appointments (look out 90 days)    Sep 22, 2022 10:15 AM  Return Visit with Sg Ortiz DO  [facility] [address]   [place] [address]  [phone]   Nov 22, 2022  2:15 PM  Return Visit with Sg Ortiz DO  [facility] [address]  [place] [address]  [phone]     Routing refill request to provider for review/approval because:  Unable to complete prescription refill per RN Medication Refill Policy. "   Em Haywood RN ....................  9/12/2022   8:54 AM

## 2022-09-12 NOTE — TELEPHONE ENCOUNTER
"Requested Prescriptions   Pending Prescriptions Disp Refills     hydrochlorothiazide (HYDRODIURIL) 25 MG tablet [Pharmacy Med Name: HYDROCHLOROTHIAZIDE 25MG TAB] 90 tablet 3     Sig: TAKE 1 TABLET (25 MG) BY MOUTH DAILY       Diuretics (Including Combos) Protocol Failed - 9/12/2022  2:25 PM        Failed - Blood pressure under 140/90 in past 12 months     BP Readings from Last 3 Encounters:   04/12/22 (!) 140/85   03/02/22 (!) 170/110   02/25/22 138/82                 Passed - Recent (12 mo) or future (30 days) visit within the authorizing provider's specialty     Patient has had an office visit with the authorizing provider or a provider within the authorizing providers department within the previous 12 mos or has a future within next 30 days. See \"Patient Info\" tab in inbasket, or \"Choose Columns\" in Meds & Orders section of the refill encounter.              Passed - Medication is active on med list        Passed - Patient is age 18 or older        Passed - Normal serum creatinine on file in past 12 months     Recent Labs   Lab Test 04/12/22  1126   CR 1.15              Passed - Normal serum potassium on file in past 12 months     Recent Labs   Lab Test 04/12/22  1126   POTASSIUM 4.0                    Passed - Normal serum sodium on file in past 12 months     Recent Labs   Lab Test 04/12/22  1126                    Last Written Prescription Date:  9/11/21  Last Fill Quantity: 90,   # refills: 3  Last Office Visit with MIGUEL Colunga CNP: 3/2/22  Last office visit with Sg Ortiz, DO: 11/23/21  Future Office visit:    Next 5 appointments (look out 90 days)    Sep 22, 2022 10:15 AM  Return Visit with Sg Ortiz DO  [facility] [address]  [place] [id number] [phone]   Nov 22, 2022  2:15 PM  Return Visit with Sg Ortiz DO  [facility] ) [address]  [place] [id number] [phone]     Routing refill request to provider for review/approval because:  Unable to complete prescription refill per RN Medication Refill Policy.   Em Haywood RN ....................  9/12/2022   3:07 PM

## 2022-09-20 ENCOUNTER — OFFICE VISIT (OUTPATIENT)
Dept: ORTHOPEDICS | Facility: OTHER | Age: 47
End: 2022-09-20
Attending: STUDENT IN AN ORGANIZED HEALTH CARE EDUCATION/TRAINING PROGRAM
Payer: COMMERCIAL

## 2022-09-20 DIAGNOSIS — Z98.1 S/P CERVICAL SPINAL FUSION: Primary | ICD-10-CM

## 2022-09-20 PROCEDURE — 99213 OFFICE O/P EST LOW 20 MIN: CPT | Performed by: STUDENT IN AN ORGANIZED HEALTH CARE EDUCATION/TRAINING PROGRAM

## 2022-09-20 PROCEDURE — G0463 HOSPITAL OUTPT CLINIC VISIT: HCPCS

## 2022-09-20 NOTE — PROGRESS NOTES
Patient is here for follow up on his cervical spine.  Gay Mckeon LPN .....................9/20/2022 1:45 PM
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
## 2022-09-21 NOTE — PROGRESS NOTES
[facility] HEART [facility]   CARDIOLOGY PROGRESS NOTE     Chief Complaint   Patient presents with     Follow Up     Cardiac clearance-neck injection          Diagnosis:  1.  Cardiac cath, [facility] on 9/8/2020. x2 stents to oLAD. [facility] on 10/29/2021 stable disease, no stent.  80% ramus felt unamenable to stenting.  2.  Stress test: Abnormal stress echo on 8/24/2020-false positive.  Normal nuclear on 3/4/2022.  3.  Cath on 10/29/2021, [facility].  LM 0%, LAD 40%, ramus 80%, LCx 50%, and RCA 0%  4.  Morbid obesity 250+ pounds.  5.  Hyperlipidemia-uncontrolled.  6.  Hypertension-controlled.  7.  QL-7-uqevypiajwuu.  8.  SCOTT.  9.  GERD.  10.  Mild asthma.  11.  H/O tobacco abuse.  Quitting in 2013-1/2 pack a day.  12.  SCOTT-on CPAP.   13.  COVID-19 (+) on 10/7/2021.  10.  Vitamin D deficiency.    Assessment/Plan:    1.  CAD: Reviewed his cardiac cath from 10/29/2021.  Patient found of an 80% lesion to the ramus but described as being not amenable to PCI.  Patient having atypical chest discomfort.  He describes a tenderness to his chest reproducible with palpation.  He is not having anginal symptoms.  0%, RCA 0%, proximal LCx 50%, stent to LAD 20%, and 40% lesion to mid LAD.  Continue aspirin 81 mg daily, Plavix 75 mg daily, and sublingual nitro as needed for chest pain.  Imdur 60 mg will be increased to 120 mg daily for blood pressure/anginal symptoms.  2.  Hypertension: Uncontrolled.  Increase amlodipine 5 mg daily to 10 mg daily.  We will increase Imdur from 60 mg daily to 120 mg daily.  Continue Coreg 25 mg twice a day, hydrochlorothiazide 25 mg daily, and lisinopril 40 mg daily.  3.  Hyperlipidemia: Cholesterol is uncontrolled on Crestor 20 mg daily.  Will increase to 40 mg but has not been rechecked.  Patient is due for an annual with primary.  Plans to have labs with his primary at that time.  4.  SCOTT: He states he was assessed in [place] and found to have sleep apnea.  He does wear it but reluctantly.  He finds it mildly  helpful.  5.  Follow-up 1 year or sooner with issues.        Interval history:  Previously, Herb was referred to [facility] secondary to a grossly abnormal stress test on 8/24/2020.  He had wall motion abnormalities consistent with LAD territory ischemia.  EF went from 50-55% at rest to 45-50% with activity with dilation of the LV cavity.  This resulted in x2 stents to his LAD secondary to a 70% lesion.  The rest of the vessels were found to have mild disease.  Similar symptoms returned.  He describes dyspnea on exertion and chest tightness.  He was sent to the [facility] for cardiac catheterization.  Cardiac catheterization completed on 10/29/2021.  He was found to have stable disease.  The ramus was 80% blocked and not stented.  The procedure described the ramus as being not amendable to stenting.  His stent to the LAD had a 20%.  The remainder of blockages were less than 70%.  It was suggested he try medical management.  Today, will increase Imdur 60 mg to 120 mg daily.  We will increase amlodipine from 5 mg daily to 10 mg daily as pressures remain elevated.  We discussed diet and activity.  He is not very active but he does have a back issues.  His diabetes has been uncontrolled.  He is having structural chest discomfort, reproducible with palpation.  We reviewed his cardiac catheterization and discussed his ramus being 80% but not amenable to stenting.  He does not appear to be having anginal symptoms.  We will continue medical management as described above.  JASPAL chen describes being fatigued and somewhat depressed at times.  He was seen by sleep medicine and now wearing his CPAP.  He does not like wearing it but does find it somewhat helpful.  He was hopeful to get off some of his medications.  Reviewed all his medications.  I do not believe anything could be discontinued.  He has not started smoking.  He will follow-up in year sooner or sooner with issues      HPI:    Mr. Figueroa is being  seen by cardiology for stable angina.  He had a abnormal stress echo suggesting LAD stenosis on 8/24/2020.  There is also history of morbid obesity with a weight of 231 today, hyperlipidemia-uncontrolled, hypertension-uncontrolled, CO-6-xqmxbuivgpce, SCOTT, GERD, mild asthma, history of tobacco abuse quitting smoking in approximately 2013 had a half a pack a day.    He had described substernal left and right precordial chest tightness.  His discomfort radiating to his neck and between his shoulder blades.  His discomfort has been exertional.  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  His symptoms have been going on for the last month.  Describes it as sharp/squeezing to his chest lasting 5 to 10 minutes.  With that he has been diaphoretic, nauseous occasionally and has been short of breath.  He denied vomiting, but never feels fully rested.  He has been sleeping between 6 and 8 hours at night.  He has been sleeping well but continues to be fatigued.  Symptoms are somewhat comparable to symptoms he had previously which resulted in stenting.  I suggested he have stress echo which he was agreeable to.  He does not think he would be able to walk on a treadmill but would be able do an exercise bike.  He also has been having right shoulder, elbow, wrist, and carpal tunnel discomfort.  He is needing Crestor and sublingual nitro refilled.  I suggested he follow-up with sleep medicine as he is struggling to wear his CPAP consistently.     He reports for the last 6 to 12 months, he has been having substernal chest pressure with radiation to his neck, left shoulder, and down his left arm. Generally, this is brought on by emotional stress.  More recently, he has seen increased episodes with activity.  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  He works in a building of 7  people.  He states at times this can be labor-intensive.  With cutting this paper, he describes a tightness to his chest as outlined below.  Other times, his discomfort has a sharp component.  His chest discomfort is more often brought on by anxiety and stress.  He reports his daughter was killed in a motor vehicle accident at age 12, 18 years ago.  This resulted the divorce of his first wife 10 years ago.  He is remarried but has ongoing anxiety.  He states he misses his daughter every day.  His risk factors include history of tobacco abuse quitting approximate 7 years ago which would be around 2013-1/2 pack a day, uncontrolled diabetes mellitus with an A1c of 8.3% on 8/18/2020.  Cholesterol has been severely uncontrolled with a total cholesterol of 262, , and triglycerides of 193 on 11/14/2017.  He has a family history of heart disease with an uncle having bypass today, 8/27/2020.  His mom has history of heart disease and his dad had heart disease with diabetes. With his chest discomfort, he admits to diaphoresis, nausea, shortness of breath, dizziness, but no vomiting.  His symptoms will last up to 5 minutes.  He has not been on aspirin or sublingual nitro.  His blood pressure today is severely uncontrolled at 196/98.      He went on to have a stress echo on 8/24/2020.  Test was felt to be high risk.  He had mid anterior, basal with mid anterior septal hypokinesis suggesting LAD territory.  Ejection fraction was 55 to 60% and went to 45 to 50% with mild dilatation of the LV cavity.     Based on his symptoms, history, and abnormal stress test, it was suggested he have a cardiac catheterization sooner than later.  [facility].  He will be scheduled for [facility] in a couple of weeks.      Relevant testing:  Stress test on 3/4/2022:     The nuclear stress test is probably negative for inducible myocardial   ischemia or infarction.      The left ventricular ejection fraction at rest is  63%.  The left   ventricular ejection fraction at stress is 61%.      There is no prior study for comparison.     Cardiac cath at the [facility] on 10/20/2021:  LM: Patent  LAD: Up to 40%.  Ramus: 80%.  LCx: 50%.    Stress echo on 8/24/2020:  Abnormal, high-risk exercise stress echocardiogram at sub-maximal exercise capacity.   The target heart rate was not achieved (80% of predicted). At peak exercise, there is hypokinesis of the mid anterior and basal and mid anteroseptum,  segments. This is suggestive of LAD territory ischemia.   Normal LV size and function at baseline. The LVEF is 55-60% at rest and declined to 45-50% after exercise with mild dilation of the LV cavity.   Heart rate and blood pressure response to exercise were normal.   Normal functional capacity. The patient had chest pain during peak exercise.   The test was terminated due to fatigue.   No significant valvular or aortic abnormalities noted on screening tomograms.       Past Medical History:   Diagnosis Date     Encounter for screening for cardiovascular disorders     02-11, Negative stress test     Essential (primary) hypertension     No Comments Provided     Gastro-esophageal reflux disease without esophagitis     No Comments Provided     Major depressive disorder, single episode     After the death of his daughter in MVA     Mild intermittent asthma, uncomplicated     No Comments Provided     Nicotine dependence, uncomplicated     age [age], 1-2 packs per day, Quit Aug 2005     Suicidal ideations     2013,Hospitalized in [place]       Past Surgical History:   Procedure Laterality Date     CV CORONARY ANGIOGRAM N/A 10/29/2021    Procedure: CV CORONARY ANGIOGRAM;  Surgeon: Juan Hdz MD;  Location:  HEART CARDIAC CATH LAB     LAPAROSCOPIC CHOLECYSTECTOMY      11-05,Dr. Dunne     TYMPANOSTOMY, LOCAL/TOPICAL ANESTHESIA      No Comments Provided       No Known Allergies    Current Outpatient Medications   Medication Sig Dispense Refill      amLODIPine (NORVASC) 10 MG tablet Take 0.5 tablets (5 mg) by mouth daily 90 tablet 3     aspirin (ASA) 81 MG chewable tablet Take 1 tablet (81 mg) by mouth daily 90 tablet 3     blood glucose (NO BRAND SPECIFIED) test strip Use to test blood sugar 1 times daily. 100 each 11     blood glucose monitoring (ACCU-CHEK LILIANA PLUS) meter device kit NEEDS METER,STRIPS,LANCETS 1 kit 1     blood glucose monitoring (ACCU-CHEK MULTICLIX) lancets Use to test blood sugar 1 times daily. 102 each 11     carvedilol (COREG) 25 MG tablet Take 1 tablet (25 mg) by mouth 2 times daily (with meals) 180 tablet 3     citalopram (CELEXA) 20 MG tablet TAKE 1 TABLET (20 MG) BY MOUTH DAILY 90 tablet 3     clopidogrel (PLAVIX) 75 MG tablet Take 1 tablet (75 mg) by mouth daily 90 tablet 3     gabapentin (NEURONTIN) 300 MG capsule TAKE 2 CAPSULES BY MOUTH THREE TIMES DAILY 180 capsule 0     glipiZIDE (GLUCOTROL XL) 10 MG 24 hr tablet TAKE 1 TAB BY MOUTH ONCE DAILY 90 tablet 3     hydrochlorothiazide (HYDRODIURIL) 25 MG tablet Take 1 tablet (25 mg) by mouth daily 90 tablet 3     isosorbide mononitrate (IMDUR) 120 MG 24 HR ER tablet Take 1 tablet (120 mg) by mouth daily 90 tablet 3     lisinopril (ZESTRIL) 40 MG tablet TAKE 1 TAB BY MOUTH ONCE DAILY 90 tablet 3     meloxicam (MOBIC) 15 MG tablet TAKE 1 TAB BY MOUTH ONCE DAILY 90 tablet 3     metFORMIN (GLUCOPHAGE) 1000 MG tablet TAKE 1 TABLET BY MOUTH TWICE A DAY WITH MEALS (Patient taking differently: Not currently taking due to nausea) 180 tablet 3     nitroGLYcerin (NITROSTAT) 0.4 MG sublingual tablet For chest pain place 1 tablet under the tongue every 5 minutes for 3 doses. If symptoms persist 5 minutes after 1st dose call 911. 25 tablet 3     omeprazole (PRILOSEC) 40 MG DR capsule TAKE 1 CAPSULE BY MOUTH ONCE DAILY WITH FOOD 90 capsule 3     rosuvastatin (CRESTOR) 40 MG tablet Take 1 tablet (40 mg) by mouth daily 90 tablet 3       Social History     Socioeconomic History     Marital  status: Legally      Spouse name: Not on file     Number of children: Not on file     Years of education: Not on file     Highest education level: Not on file   Occupational History     Not on file   Tobacco Use     Smoking status: Former Smoker     Packs/day: 0.00     Years: 20.00     Pack years: 0.00     Types: Cigarettes     Quit date: 2013     Years since quittin.1     Smokeless tobacco: Never Used   Vaping Use     Vaping Use: Never used   Substance and Sexual Activity     Alcohol use: Yes     Comment: Alcoholic Drinks/day: occasionally-rare 4 tomes a year     Drug use: Not Currently     Sexual activity: Not Currently   Other Topics Concern     Parent/sibling w/ CABG, MI or angioplasty before 65F 55M? Not Asked   Social History Narrative    .  Two sons.      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      Works as a  at [employer].      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     Social Determinants of Health     Financial Resource Strain: Not on file   Food Insecurity: Not on file   Transportation Needs: Not on file   Physical Activity: Not on file   Stress: Not on file   Social Connections: Not on file   Intimate Partner Violence: Not on file   Housing Stability: Not on file       LAB RESULTS:   Transferred Records on 2020   Component Date Value Ref Range Status     Potassium 2020 3.7  3.5 - 5.1 mmol/L Final     Glucose 2020 151* 60 - 99 mg/dL Final     Creatinine 2020 0.90  0.80 - 1.50 mg/dL Final     GFR Estimate 2020 >60  SEE SCAN ml/min/1.73m2 Final   [facility]ied [facility]/Nurse Visit on 2020   Component Date Value Ref Range Status     COVID-19 Virus PCR to [facility] - So* 2020 Nasopharyngeal   Final     COVID-19 Virus PCR to [facility] - Re* 2020 Not Detected   Final   Orders Only on 2020   Component Date Value Ref Range Status     Sodium 2020  "141  134 - 144 mmol/L Final     Potassium 09/02/2020 3.9  3.5 - 5.1 mmol/L Final     Chloride 09/02/2020 99  98 - 107 mmol/L Final     Carbon Dioxide 09/02/2020 33* 21 - 31 mmol/L Final     Anion Gap 09/02/2020 9  3 - 14 mmol/L Final     Glucose 09/02/2020 187* 70 - 105 mg/dL Final     Urea Nitrogen 09/02/2020 12  7 - 25 mg/dL Final     Creatinine 09/02/2020 1.04  0.70 - 1.30 mg/dL Final     GFR Estimate 09/02/2020 77  >60 mL/min/[1.73_m2] Final     GFR Estimate If Black 09/02/2020 >90  >60 mL/min/[1.73_m2] Final     Calcium 09/02/2020 9.7  8.6 - 10.3 mg/dL Final     WBC 09/02/2020 4.5  4.0 - 11.0 10e9/L Final     RBC Count 09/02/2020 5.41  4.4 - 5.9 10e12/L Final     Hemoglobin 09/02/2020 15.6  13.3 - 17.7 g/dL Final     Hematocrit 09/02/2020 46.4  40.0 - 53.0 % Final     MCV 09/02/2020 86  78 - 100 fl Final     MCH 09/02/2020 28.8  26.5 - 33.0 pg Final     MCHC 09/02/2020 33.6  31.5 - 36.5 g/dL Final     RDW 09/02/2020 11.9  10.0 - 15.0 % Final     Platelet Count 09/02/2020 157  150 - 450 10e9/L Final   Office Visit on 08/27/2020   Component Date Value Ref Range Status     Interpretation ECG 08/27/2020 Click View Image link to view waveform and result   Final   Office Visit on 08/18/2020   Component Date Value Ref Range Status     Hemoglobin A1C 08/18/2020 8.3* 4.0 - 6.0 % Final        Review of systems: Negative except that which was noted in the HPI.    Physical examination:  BP (!) 148/90 (BP Location: Right arm, Patient Position: Sitting, Cuff Size: Adult Large)   Pulse 69   Temp 98.1  F (36.7  C) (Temporal)   Resp 18   Ht 1.78 m (5' 10.08\")   Wt 119.7 kg (264 lb)   SpO2 99%   BMI 37.79 kg/m      GENERAL APPEARANCE: healthy, alert and no distress  HEENT: no icterus, no xanthelasmas, normal pupil size and reaction, no cyanosis.  NECK: no adenopathy, no asymmetry, masses, or scars.  CHEST: lungs clear to auscultation - no rales, rhonchi or wheezes, no use of accessory muscles, no retractions, respirations " are unlabored, normal respiratory rate  CARDIOVASCULAR: regular rhythm, normal S1 with physiologic split S2, no S3 or S4 and no murmur, click or rub  ABDOMEN: soft, non tender, bowel sounds normal  EXTREMITIES: no clubbing, cyanosis or edema  NEURO: alert and oriented normal speech, and affect  VASC: No vascular bruits heard.  SKIN: no ecchymoses, no rashes.    Total time spent on day of visit, including review of tests, obtaining/reviewing separately obtained history, ordering medications/tests/procedures, communicating with PCP/consultants, and documenting in electronic medical record: 40 minutes.         Thank you for allowing me to participate in the care of your patient. Please do not hesitate to contact me if you have any questions.     Sg Ortiz, DO

## 2022-09-22 ENCOUNTER — OFFICE VISIT (OUTPATIENT)
Dept: CARDIOLOGY | Facility: OTHER | Age: 47
End: 2022-09-22
Attending: INTERNAL MEDICINE
Payer: COMMERCIAL

## 2022-09-22 VITALS
BODY MASS INDEX: 37.8 KG/M2 | HEIGHT: 70 IN | SYSTOLIC BLOOD PRESSURE: 148 MMHG | HEART RATE: 69 BPM | WEIGHT: 264 LBS | RESPIRATION RATE: 18 BRPM | DIASTOLIC BLOOD PRESSURE: 90 MMHG | TEMPERATURE: 98.1 F | OXYGEN SATURATION: 99 %

## 2022-09-22 DIAGNOSIS — R94.39 ABNORMAL STRESS TEST: ICD-10-CM

## 2022-09-22 DIAGNOSIS — Z87.891 HISTORY OF TOBACCO ABUSE: ICD-10-CM

## 2022-09-22 DIAGNOSIS — Z98.890 STATUS POST CORONARY ANGIOGRAM: ICD-10-CM

## 2022-09-22 DIAGNOSIS — E11.9 TYPE 2 DIABETES MELLITUS WITHOUT COMPLICATION, WITHOUT LONG-TERM CURRENT USE OF INSULIN (H): ICD-10-CM

## 2022-09-22 DIAGNOSIS — I25.118 CORONARY ARTERY DISEASE OF NATIVE ARTERY OF NATIVE HEART WITH STABLE ANGINA PECTORIS (H): ICD-10-CM

## 2022-09-22 DIAGNOSIS — Z95.5 STATUS POST INSERTION OF DRUG-ELUTING STENT INTO LEFT ANTERIOR DESCENDING (LAD) ARTERY: ICD-10-CM

## 2022-09-22 DIAGNOSIS — Z98.890 S/P CARDIAC CATH: ICD-10-CM

## 2022-09-22 DIAGNOSIS — I25.10 CORONARY ARTERY DISEASE INVOLVING NATIVE CORONARY ARTERY OF NATIVE HEART WITHOUT ANGINA PECTORIS: ICD-10-CM

## 2022-09-22 DIAGNOSIS — I10 ESSENTIAL HYPERTENSION: ICD-10-CM

## 2022-09-22 DIAGNOSIS — G47.33 OSA ON CPAP: Primary | ICD-10-CM

## 2022-09-22 DIAGNOSIS — I20.89 STABLE ANGINA (H): ICD-10-CM

## 2022-09-22 DIAGNOSIS — E78.2 MIXED HYPERLIPIDEMIA: ICD-10-CM

## 2022-09-22 DIAGNOSIS — E66.01 MORBID OBESITY (H): ICD-10-CM

## 2022-09-22 DIAGNOSIS — Z95.5 HISTORY OF CORONARY ARTERY STENT PLACEMENT: ICD-10-CM

## 2022-09-22 DIAGNOSIS — I25.119 CORONARY ARTERY DISEASE INVOLVING NATIVE CORONARY ARTERY OF NATIVE HEART WITH ANGINA PECTORIS (H): ICD-10-CM

## 2022-09-22 LAB
ATRIAL RATE - MUSE: 63 BPM
DIASTOLIC BLOOD PRESSURE - MUSE: NORMAL MMHG
INTERPRETATION ECG - MUSE: NORMAL
P AXIS - MUSE: 57 DEGREES
PR INTERVAL - MUSE: 200 MS
QRS DURATION - MUSE: 110 MS
QT - MUSE: 412 MS
QTC - MUSE: 421 MS
R AXIS - MUSE: 14 DEGREES
SYSTOLIC BLOOD PRESSURE - MUSE: NORMAL MMHG
T AXIS - MUSE: 39 DEGREES
VENTRICULAR RATE- MUSE: 63 BPM

## 2022-09-22 PROCEDURE — 93005 ELECTROCARDIOGRAM TRACING: CPT | Performed by: INTERNAL MEDICINE

## 2022-09-22 PROCEDURE — 99214 OFFICE O/P EST MOD 30 MIN: CPT | Performed by: INTERNAL MEDICINE

## 2022-09-22 PROCEDURE — G0463 HOSPITAL OUTPT CLINIC VISIT: HCPCS | Performed by: INTERNAL MEDICINE

## 2022-09-22 PROCEDURE — 93010 ELECTROCARDIOGRAM REPORT: CPT | Performed by: INTERNAL MEDICINE

## 2022-09-22 RX ORDER — AMLODIPINE BESYLATE 10 MG/1
10 TABLET ORAL DAILY
Qty: 90 TABLET | Refills: 3 | Status: SHIPPED | OUTPATIENT
Start: 2022-09-22 | End: 2023-10-05

## 2022-09-22 RX ORDER — CARVEDILOL 25 MG/1
25 TABLET ORAL 2 TIMES DAILY WITH MEALS
Qty: 180 TABLET | Refills: 3 | Status: SHIPPED | OUTPATIENT
Start: 2022-09-22 | End: 2024-01-30

## 2022-09-22 RX ORDER — AMLODIPINE BESYLATE 10 MG/1
5 TABLET ORAL DAILY
Qty: 90 TABLET | Refills: 3 | Status: SHIPPED | OUTPATIENT
Start: 2022-09-22 | End: 2022-09-22

## 2022-09-22 RX ORDER — HYDROCHLOROTHIAZIDE 25 MG/1
25 TABLET ORAL DAILY
Qty: 90 TABLET | Refills: 3 | Status: SHIPPED | OUTPATIENT
Start: 2022-09-22 | End: 2023-10-05

## 2022-09-22 RX ORDER — ROSUVASTATIN CALCIUM 40 MG/1
40 TABLET, COATED ORAL DAILY
Qty: 90 TABLET | Refills: 3 | Status: SHIPPED | OUTPATIENT
Start: 2022-09-22 | End: 2024-07-29

## 2022-09-22 RX ORDER — NITROGLYCERIN 0.4 MG/1
TABLET SUBLINGUAL
Qty: 25 TABLET | Refills: 3 | Status: SHIPPED | OUTPATIENT
Start: 2022-09-22 | End: 2024-07-09

## 2022-09-22 RX ORDER — ISOSORBIDE MONONITRATE 120 MG/1
120 TABLET, EXTENDED RELEASE ORAL DAILY
Qty: 90 TABLET | Refills: 3 | Status: SHIPPED | OUTPATIENT
Start: 2022-09-22 | End: 2023-12-13

## 2022-09-22 RX ORDER — LISINOPRIL 40 MG/1
TABLET ORAL
Qty: 90 TABLET | Refills: 3 | Status: SHIPPED | OUTPATIENT
Start: 2022-09-22 | End: 2023-12-13

## 2022-09-22 RX ORDER — CLOPIDOGREL BISULFATE 75 MG/1
75 TABLET ORAL DAILY
Qty: 90 TABLET | Refills: 3 | Status: SHIPPED | OUTPATIENT
Start: 2022-09-22 | End: 2023-10-05

## 2022-09-22 RX ORDER — ASPIRIN 81 MG/1
81 TABLET, CHEWABLE ORAL DAILY
Qty: 90 TABLET | Refills: 3 | Status: SHIPPED | OUTPATIENT
Start: 2022-09-22 | End: 2023-10-05

## 2022-09-22 ASSESSMENT — PATIENT HEALTH QUESTIONNAIRE - PHQ9: SUM OF ALL RESPONSES TO PHQ QUESTIONS 1-9: 13

## 2022-09-22 ASSESSMENT — PAIN SCALES - GENERAL: PAINLEVEL: SEVERE PAIN (6)

## 2022-09-22 NOTE — NURSING NOTE
"Patient comes in for cardiac clearance for neck injection.  Judit Jane LPN ....................9/22/2022   10:27 AM  Chief Complaint   Patient presents with     Follow Up     Cardiac clearance-neck injection       Initial BP (!) 148/90 (BP Location: Right arm, Patient Position: Sitting, Cuff Size: Adult Large)   Pulse 69   Temp 98.1  F (36.7  C) (Temporal)   Resp 18   Ht 1.78 m (5' 10.08\")   Wt 119.7 kg (264 lb)   SpO2 99%   BMI 37.79 kg/m   Estimated body mass index is 37.79 kg/m  as calculated from the following:    Height as of this encounter: 1.78 m (5' 10.08\").    Weight as of this encounter: 119.7 kg (264 lb).  Meds Reconciled: complete  Pt is on Aspirin  Pt is on a Statin  PHQ and/or MARGY reviewed. Pt referred to PCP/MH Provider as appropriate.    Judit Jane LPN    FOOD SECURITY SCREENING QUESTIONS  Hunger Vital Signs:  Within the past 12 months we worried whether our food would run out before we got money to buy more. Never  Within the past 12 months the food we bought just didn't last and we didn't have money to get more. Never  Judit Jane LPN 9/22/2022 10:28 AM    "

## 2022-09-24 DIAGNOSIS — M54.50 LOW BACK PAIN WITHOUT SCIATICA, UNSPECIFIED BACK PAIN LATERALITY, UNSPECIFIED CHRONICITY: ICD-10-CM

## 2022-09-27 RX ORDER — GABAPENTIN 300 MG/1
CAPSULE ORAL
Qty: 180 CAPSULE | Refills: 0 | Status: SHIPPED | OUTPATIENT
Start: 2022-09-27 | End: 2023-01-29

## 2022-09-27 NOTE — TELEPHONE ENCOUNTER
[facility] #[id number] sent Rx request for the following:      GABAPENTIN 300MG CAPSULE    Last Prescription Date:   6/23/22  Last Fill Qty/Refills:         180, R-0    Last Office Visit:              4/12/22   Future Office visit:           None      Unable to complete prescription refill per RN Medication Refill Policy.     Coretta Puckett RN .............. 9/27/2022  3:37 PM

## 2022-11-01 ENCOUNTER — TRANSFERRED RECORDS (OUTPATIENT)
Dept: HEALTH INFORMATION MANAGEMENT | Facility: OTHER | Age: 47
End: 2022-11-01

## 2022-11-01 LAB — RETINOPATHY: POSITIVE

## 2022-11-18 DIAGNOSIS — F41.9 ANXIETY: ICD-10-CM

## 2022-11-21 ENCOUNTER — HOSPITAL ENCOUNTER (OUTPATIENT)
Dept: CT IMAGING | Facility: OTHER | Age: 47
Discharge: HOME | End: 2022-11-21
Attending: STUDENT IN AN ORGANIZED HEALTH CARE EDUCATION/TRAINING PROGRAM | Admitting: STUDENT IN AN ORGANIZED HEALTH CARE EDUCATION/TRAINING PROGRAM
Payer: COMMERCIAL

## 2022-11-21 DIAGNOSIS — Z98.1 S/P CERVICAL SPINAL FUSION: ICD-10-CM

## 2022-11-21 PROCEDURE — 72125 CT NECK SPINE W/O DYE: CPT

## 2022-11-21 NOTE — PROGRESS NOTES
[facility] HEART [facility]   CARDIOLOGY PROGRESS NOTE     Chief Complaint   Patient presents with     Follow Up     annual          Diagnosis:  1.  Cardiac cath, [facility] on 9/8/2020. x2 stents to oLAD. [facility] on 10/29/2021 stable disease, no stent. 80% ramus felt unamenable to stenting.  2.  Stress test: Abnormal stress echo on 8/24/2020-false positive.  Normal nuclear on 3/4/2022.  3.  Cath on 10/29/2021, [facility].  LM 0%, LAD 40%, ramus 80%, LCx 50%, and RCA 0%  4.  Morbid obesity 260+ pounds.  5.  Hyperlipidemia-uncontrolled.  6.  Hypertension-uncontrolled.  7.  FX-9-wffaghxyqdwb.  8.  SCOTT.  9.  GERD.  10.  Mild asthma.  11.  H/O tobacco abuse. Quitting in 2013-1/2 pack a day.  12.  SCOTT-on CPAP.   13.  COVID-19 (+) on 10/7/2021.  10.  Vitamin D deficiency.    Assessment/Plan:    1.  CAD: Reviewed his cardiac cath from 10/29/2021.  Patient found of an 80% lesion to the ramus but described as being not amenable to PCI.  Patient having atypical chest discomfort. He is not having anginal symptoms.  0%, RCA 0%, proximal LCx 50%, stent to LAD 20%, and 40% lesion to mid LAD.  Aspirin 81 mg daily, Plavix 75 mg daily, Imdur 120 mg daily, Crestor 20 mg daily and sublingual nitro as needed for chest pain.  2.  Hypertension: Now controlled on amlodipine 10 mg daily, Coreg 25 mg twice a day, hydrochlorothiazide 25 mg daily, Imdur 120 mg daily, and lisinopril 40 mg daily.  3.  Fatigue: Patient has mixed sleeping habits.  At times has hard time falling asleep and then wakes frequently throughout the night.  Just to get a sad light.  Also could be from Coreg.  Have suggested increase his activity.  He states part of his inability to sleep is from pain all over his body.  Suggested he hold Crestor for 30 days and see if his symptoms improve.  He also describes his mind running at night.  Suggested he see sleep medicine but declined.  We will revisit in the future.  Suggested he take B12 over-the-counter.  4.  Hyperlipidemia: Controlled  on Crestor 40 mg daily.  Having numerous  Muscle and joint aches.  Suggested he hold this medication for 30 days to see if his symptoms improve if they do, let us know and he will be switched to something else.  If they do not, restart the medication.  5.  Follow-up in future in a year.        Interval history:  Previously, Herb was referred to [facility] secondary to a grossly abnormal stress test on 8/24/2020.  He had wall motion abnormalities consistent with LAD territory ischemia.  EF went from 50-55% at rest to 45-50% with activity with dilation of the LV cavity.  This resulted in x2 stents to his LAD secondary to a 70% lesion.  The rest of the vessels were found to have mild disease.  Similar symptoms returned.  He describes dyspnea on exertion and chest tightness.  He was sent to the [facility] for cardiac catheterization.  Cardiac catheterization completed on 10/29/2021.  He was found to have stable disease.  The ramus was 80% blocked and not stented.  The procedure described the ramus as being not amendable to stenting.  His stent to the LAD had a 20% lesion.  The remainder of blockages were less than 70%.  It was suggested he try medical management.  He continues to verdugo with fatigue.  I think this is poor sleep hygiene, stress, and potentially medications.  He has a hard time falling asleep at times.  He states he recently had his CPAP checked and his settings are appropriate.  He tries to go bed at 9 AM.  Other times he will wake up multiple times during the night and having hard time falling back to sleep.  Certainly, anxiety may be playing a part.  He describes multiple joint and muscle pains.  I suggested he hold Crestor for 30 days see if this alleviates his symptoms.  Suggest he try a sad light.  Suggest he take B12/B complex over-the-counter.  He is not having significant anginal symptoms.  Follow-up in the future in a year.      HPI:    Mr. Figueroa is being seen by cardiology for  stable angina.  He had a abnormal stress echo suggesting LAD stenosis on 8/24/2020.  There is also history of morbid obesity with a weight of 231 today, hyperlipidemia-uncontrolled, hypertension-uncontrolled, WW-0-xniwvcdjexow, SCOTT, GERD, mild asthma, history of tobacco abuse quitting smoking in approximately 2013 had a half a pack a day.    He had described substernal left and right precordial chest tightness.  His discomfort radiating to his neck and between his shoulder blades.  His discomfort has been exertional.  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  His symptoms have been going on for the last month.  Describes it as sharp/squeezing to his chest lasting 5 to 10 minutes.  With that he has been diaphoretic, nauseous occasionally and has been short of breath.  He denied vomiting, but never feels fully rested.  He has been sleeping between 6 and 8 hours at night.  He has been sleeping well but continues to be fatigued.  Symptoms are somewhat comparable to symptoms he had previously which resulted in stenting.  I suggested he have stress echo which he was agreeable to.  He does not think he would be able to walk on a treadmill but would be able do an exercise bike.  He also has been having right shoulder, elbow, wrist, and carpal tunnel discomfort.  He is needing Crestor and sublingual nitro refilled.  I suggested he follow-up with sleep medicine as he is struggling to wear his CPAP consistently.     He reports for the last 6 to 12 months, he has been having substernal chest pressure with radiation to his neck, left shoulder, and down his left arm. Generally, this is brought on by emotional stress.  More recently, he has seen increased episodes with activity.  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  He works in a building of 7 people.  He states at times  this can be labor-intensive.  With cutting this paper, he describes a tightness to his chest as outlined below.  Other times, his discomfort has a sharp component.  His chest discomfort is more often brought on by anxiety and stress.  He reports his daughter was killed in a motor vehicle accident at age 12, 18 years ago.  This resulted the divorce of his first wife 10 years ago.  He is remarried but has ongoing anxiety.  He states he misses his daughter every day.  His risk factors include history of tobacco abuse quitting approximate 7 years ago which would be around 2013-1/2 pack a day, uncontrolled diabetes mellitus with an A1c of 8.3% on 8/18/2020.  Cholesterol has been severely uncontrolled with a total cholesterol of 262, , and triglycerides of 193 on 11/14/2017.  He has a family history of heart disease with an uncle having bypass today, 8/27/2020.  His mom has history of heart disease and his dad had heart disease with diabetes. With his chest discomfort, he admits to diaphoresis, nausea, shortness of breath, dizziness, but no vomiting.  His symptoms will last up to 5 minutes.  He has not been on aspirin or sublingual nitro.  His blood pressure today is severely uncontrolled at 196/98.      He went on to have a stress echo on 8/24/2020.  Test was felt to be high risk.  He had mid anterior, basal with mid anterior septal hypokinesis suggesting LAD territory.  Ejection fraction was 55 to 60% and went to 45 to 50% with mild dilatation of the LV cavity.     Based on his symptoms, history, and abnormal stress test, it was suggested he have a cardiac catheterization sooner than later.  [facility].  He will be scheduled for [facility] in a couple of weeks.      Relevant testing:  Stress test on 3/4/2022:     The nuclear stress test is probably negative for inducible myocardial   ischemia or infarction.      The left ventricular ejection fraction at rest is 63%.  The left   ventricular  ejection fraction at stress is 61%.      There is no prior study for comparison.     Cardiac cath at the [facility] on 10/20/2021:  LM: Patent  LAD: Up to 40%.  Ramus: 80%.  Too small for stenting.  LCx: 50%.    Stress echo on 8/24/2020:  Abnormal, high-risk exercise stress echocardiogram at sub-maximal exercise capacity.   The target heart rate was not achieved (80% of predicted). At peak exercise, there is hypokinesis of the mid anterior and basal and mid anteroseptum,  segments. This is suggestive of LAD territory ischemia.   Normal LV size and function at baseline. The LVEF is 55-60% at rest and declined to 45-50% after exercise with mild dilation of the LV cavity.   Heart rate and blood pressure response to exercise were normal.   Normal functional capacity. The patient had chest pain during peak exercise.   The test was terminated due to fatigue.   No significant valvular or aortic abnormalities noted on screening tomograms.       Past Medical History:   Diagnosis Date     Encounter for screening for cardiovascular disorders     02-11, Negative stress test     Essential (primary) hypertension     No Comments Provided     Gastro-esophageal reflux disease without esophagitis     No Comments Provided     Major depressive disorder, single episode     After the death of his daughter in MVA     Mild intermittent asthma, uncomplicated     No Comments Provided     Nicotine dependence, uncomplicated     age [age], 1-2 packs per day, Quit Aug 2005     Suicidal ideations     2013,Hospitalized in [place]       Past Surgical History:   Procedure Laterality Date     CV CORONARY ANGIOGRAM N/A 10/29/2021    Procedure: CV CORONARY ANGIOGRAM;  Surgeon: Juan Hdz MD;  Location:  HEART CARDIAC CATH LAB     LAPAROSCOPIC CHOLECYSTECTOMY      11-05,Dr. Dunne     TYMPANOSTOMY, LOCAL/TOPICAL ANESTHESIA      No Comments Provided       No Known Allergies    Current Outpatient Medications   Medication Sig Dispense Refill      amLODIPine (NORVASC) 10 MG tablet Take 1 tablet (10 mg) by mouth daily 90 tablet 3     aspirin (ASA) 81 MG chewable tablet Take 1 tablet (81 mg) by mouth daily 90 tablet 3     blood glucose (NO BRAND SPECIFIED) test strip Use to test blood sugar 1 times daily. 100 each 11     blood glucose monitoring (ACCU-CHEK LILIANA PLUS) meter device kit NEEDS METER,STRIPS,LANCETS 1 kit 1     blood glucose monitoring (ACCU-CHEK MULTICLIX) lancets Use to test blood sugar 1 times daily. 102 each 11     carvedilol (COREG) 25 MG tablet Take 1 tablet (25 mg) by mouth 2 times daily (with meals) 180 tablet 3     citalopram (CELEXA) 20 MG tablet TAKE 1 TABLET (20 MG) BY MOUTH DAILY 90 tablet 3     clopidogrel (PLAVIX) 75 MG tablet Take 1 tablet (75 mg) by mouth daily 90 tablet 3     gabapentin (NEURONTIN) 300 MG capsule TAKE 2 CAPSULES BY MOUTH THREE TIMES DAILY 180 capsule 0     glipiZIDE (GLUCOTROL XL) 10 MG 24 hr tablet TAKE 1 TAB BY MOUTH ONCE DAILY 90 tablet 3     hydrochlorothiazide (HYDRODIURIL) 25 MG tablet Take 1 tablet (25 mg) by mouth daily 90 tablet 3     isosorbide mononitrate (IMDUR) 120 MG 24 HR ER tablet Take 1 tablet (120 mg) by mouth daily 90 tablet 3     lisinopril (ZESTRIL) 40 MG tablet TAKE 1 TAB BY MOUTH ONCE DAILY 90 tablet 3     meloxicam (MOBIC) 15 MG tablet TAKE 1 TAB BY MOUTH ONCE DAILY 90 tablet 3     metFORMIN (GLUCOPHAGE) 1000 MG tablet TAKE 1 TABLET BY MOUTH TWICE A DAY WITH MEALS (Patient taking differently: Not currently taking due to nausea) 180 tablet 3     nitroGLYcerin (NITROSTAT) 0.4 MG sublingual tablet For chest pain place 1 tablet under the tongue every 5 minutes for 3 doses. If symptoms persist 5 minutes after 1st dose call 911. 25 tablet 3     omeprazole (PRILOSEC) 40 MG DR capsule TAKE 1 CAPSULE BY MOUTH ONCE DAILY WITH FOOD 90 capsule 3     rosuvastatin (CRESTOR) 40 MG tablet Take 1 tablet (40 mg) by mouth daily 90 tablet 3       Social History     Socioeconomic History     Marital status:  Legally      Spouse name: Not on file     Number of children: Not on file     Years of education: Not on file     Highest education level: Not on file   Occupational History     Not on file   Tobacco Use     Smoking status: Former     Packs/day: 0.00     Years: 20.00     Pack years: 0.00     Types: Cigarettes     Quit date: 2013     Years since quittin.3     Smokeless tobacco: Never   Vaping Use     Vaping Use: Never used   Substance and Sexual Activity     Alcohol use: Yes     Comment: Alcoholic Drinks/day: occasionally-rare 4 tomes a year     Drug use: Not Currently     Sexual activity: Not Currently   Other Topics Concern     Parent/sibling w/ CABG, MI or angioplasty before 65F 55M? Not Asked   Social History Narrative    .  Two sons.      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      Works as a  at [employer].      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     Social Determinants of Health     Financial Resource Strain: Not on file   Food Insecurity: Not on file   Transportation Needs: Not on file   Physical Activity: Not on file   Stress: Not on file   Social Connections: Not on file   Intimate Partner Violence: Not on file   Housing Stability: Not on file       LAB RESULTS:   Transferred Records on 2020   Component Date Value Ref Range Status     Potassium 2020 3.7  3.5 - 5.1 mmol/L Final     Glucose 2020 151* 60 - 99 mg/dL Final     Creatinine 2020 0.90  0.80 - 1.50 mg/dL Final     GFR Estimate 2020 >60  SEE SCAN ml/min/1.73m2 Final   Allied Health/Nurse Visit on 2020   Component Date Value Ref Range Status     COVID-19 Virus PCR to [facility] - So* 2020 Nasopharyngeal   Final     COVID-19 Virus PCR to [facility] - Re* 2020 Not Detected   Final   Orders Only on 2020   Component Date Value Ref Range Status     Sodium 2020 141  134 - 144  "mmol/L Final     Potassium 09/02/2020 3.9  3.5 - 5.1 mmol/L Final     Chloride 09/02/2020 99  98 - 107 mmol/L Final     Carbon Dioxide 09/02/2020 33* 21 - 31 mmol/L Final     Anion Gap 09/02/2020 9  3 - 14 mmol/L Final     Glucose 09/02/2020 187* 70 - 105 mg/dL Final     Urea Nitrogen 09/02/2020 12  7 - 25 mg/dL Final     Creatinine 09/02/2020 1.04  0.70 - 1.30 mg/dL Final     GFR Estimate 09/02/2020 77  >60 mL/min/[1.73_m2] Final     GFR Estimate If Black 09/02/2020 >90  >60 mL/min/[1.73_m2] Final     Calcium 09/02/2020 9.7  8.6 - 10.3 mg/dL Final     WBC 09/02/2020 4.5  4.0 - 11.0 10e9/L Final     RBC Count 09/02/2020 5.41  4.4 - 5.9 10e12/L Final     Hemoglobin 09/02/2020 15.6  13.3 - 17.7 g/dL Final     Hematocrit 09/02/2020 46.4  40.0 - 53.0 % Final     MCV 09/02/2020 86  78 - 100 fl Final     MCH 09/02/2020 28.8  26.5 - 33.0 pg Final     MCHC 09/02/2020 33.6  31.5 - 36.5 g/dL Final     RDW 09/02/2020 11.9  10.0 - 15.0 % Final     Platelet Count 09/02/2020 157  150 - 450 10e9/L Final   Office Visit on 08/27/2020   Component Date Value Ref Range Status     Interpretation ECG 08/27/2020 Click View Image link to view waveform and result   Final   Office Visit on 08/18/2020   Component Date Value Ref Range Status     Hemoglobin A1C 08/18/2020 8.3* 4.0 - 6.0 % Final        Review of systems: Negative except that which was noted in the HPI.    Physical examination:  /70 (BP Location: Right arm, Patient Position: Sitting, Cuff Size: Adult Large)   Pulse 66   Temp 98.4  F (36.9  C) (Temporal)   Resp 16   Ht 1.78 m (5' 10.08\")   Wt 120.2 kg (265 lb)   SpO2 98%   BMI 37.94 kg/m      GENERAL APPEARANCE: healthy, alert and no distress  HEENT: no icterus, no xanthelasmas, normal pupil size and reaction, no cyanosis.  NECK: no adenopathy, no asymmetry, masses, or scars.  CHEST: lungs clear to auscultation - no rales, rhonchi or wheezes, no use of accessory muscles, no retractions, respirations are unlabored, " normal respiratory rate  CARDIOVASCULAR: regular rhythm, normal S1 with physiologic split S2, no S3 or S4 and no murmur, click or rub  ABDOMEN: soft, non tender, bowel sounds normal  EXTREMITIES: no clubbing, cyanosis or edema  NEURO: alert and oriented normal speech, and affect  VASC: No vascular bruits heard.  SKIN: no ecchymoses, no rashes.    Total time spent on day of visit, including review of tests, obtaining/reviewing separately obtained history, ordering medications/tests/procedures, communicating with PCP/consultants, and documenting in electronic medical record: 40 minutes.         Thank you for allowing me to participate in the care of your patient. Please do not hesitate to contact me if you have any questions.     Sg Ortiz, DO

## 2022-11-22 ENCOUNTER — OFFICE VISIT (OUTPATIENT)
Dept: CARDIOLOGY | Facility: OTHER | Age: 47
End: 2022-11-22
Attending: INTERNAL MEDICINE
Payer: COMMERCIAL

## 2022-11-22 VITALS
SYSTOLIC BLOOD PRESSURE: 112 MMHG | TEMPERATURE: 98.4 F | WEIGHT: 265 LBS | HEIGHT: 70 IN | BODY MASS INDEX: 37.94 KG/M2 | HEART RATE: 66 BPM | RESPIRATION RATE: 16 BRPM | OXYGEN SATURATION: 98 % | DIASTOLIC BLOOD PRESSURE: 70 MMHG

## 2022-11-22 DIAGNOSIS — R94.39 ABNORMAL STRESS TEST: ICD-10-CM

## 2022-11-22 DIAGNOSIS — G47.33 OSA ON CPAP: ICD-10-CM

## 2022-11-22 DIAGNOSIS — Z98.890 STATUS POST CORONARY ANGIOGRAM: ICD-10-CM

## 2022-11-22 DIAGNOSIS — Z95.5 STATUS POST INSERTION OF DRUG-ELUTING STENT INTO LEFT ANTERIOR DESCENDING (LAD) ARTERY: ICD-10-CM

## 2022-11-22 DIAGNOSIS — R53.83 FATIGUE, UNSPECIFIED TYPE: Primary | ICD-10-CM

## 2022-11-22 DIAGNOSIS — I10 ESSENTIAL HYPERTENSION: ICD-10-CM

## 2022-11-22 DIAGNOSIS — E66.01 MORBID OBESITY (H): ICD-10-CM

## 2022-11-22 DIAGNOSIS — Z95.5 HISTORY OF CORONARY ARTERY STENT PLACEMENT: ICD-10-CM

## 2022-11-22 DIAGNOSIS — I25.119 CORONARY ARTERY DISEASE INVOLVING NATIVE CORONARY ARTERY OF NATIVE HEART WITH ANGINA PECTORIS (H): ICD-10-CM

## 2022-11-22 DIAGNOSIS — E78.2 MIXED HYPERLIPIDEMIA: ICD-10-CM

## 2022-11-22 DIAGNOSIS — I25.10 CORONARY ARTERY DISEASE INVOLVING NATIVE CORONARY ARTERY OF NATIVE HEART WITHOUT ANGINA PECTORIS: ICD-10-CM

## 2022-11-22 DIAGNOSIS — Z98.890 S/P CARDIAC CATH: ICD-10-CM

## 2022-11-22 DIAGNOSIS — I25.118 CORONARY ARTERY DISEASE OF NATIVE ARTERY OF NATIVE HEART WITH STABLE ANGINA PECTORIS (H): ICD-10-CM

## 2022-11-22 PROCEDURE — G0463 HOSPITAL OUTPT CLINIC VISIT: HCPCS

## 2022-11-22 PROCEDURE — 99215 OFFICE O/P EST HI 40 MIN: CPT | Performed by: INTERNAL MEDICINE

## 2022-11-22 RX ORDER — CITALOPRAM HYDROBROMIDE 20 MG/1
20 TABLET ORAL DAILY
Qty: 90 TABLET | Refills: 3 | Status: SHIPPED | OUTPATIENT
Start: 2022-11-22 | End: 2023-11-23

## 2022-11-22 ASSESSMENT — PATIENT HEALTH QUESTIONNAIRE - PHQ9: SUM OF ALL RESPONSES TO PHQ QUESTIONS 1-9: 17

## 2022-11-22 ASSESSMENT — PAIN SCALES - GENERAL: PAINLEVEL: MODERATE PAIN (4)

## 2022-11-22 NOTE — NURSING NOTE
"Patient comes in for follow up on chest pain, tiredness and lightheaded.  Judit Jane LPN ....................11/22/2022   2:19 PM  Chief Complaint   Patient presents with     Follow Up     annual       Initial /70 (BP Location: Right arm, Patient Position: Sitting, Cuff Size: Adult Large)   Pulse 66   Temp 98.4  F (36.9  C) (Temporal)   Resp 16   Ht 1.78 m (5' 10.08\")   Wt 120.2 kg (265 lb)   SpO2 98%   BMI 37.94 kg/m   Estimated body mass index is 37.94 kg/m  as calculated from the following:    Height as of this encounter: 1.78 m (5' 10.08\").    Weight as of this encounter: 120.2 kg (265 lb).  Meds Reconciled: complete  Pt is on Aspirin  Pt is on a Statin  PHQ and/or MARGY reviewed. Pt referred to PCP/MH Provider as appropriate.    Judit Jane LPN    FOOD SECURITY SCREENING QUESTIONS  Hunger Vital Signs:  Within the past 12 months we worried whether our food would run out before we got money to buy more. Never  Within the past 12 months the food we bought just didn't last and we didn't have money to get more. Never  Judit Jane LPN 11/22/2022 2:19 PM    "

## 2022-11-22 NOTE — TELEPHONE ENCOUNTER
[id number] of [place] sent Rx request for the following:      Requested Prescriptions   Pending Prescriptions Disp Refills     citalopram (CELEXA) 20 MG tablet [Pharmacy Med Name: CITALOPRAM 20MG TABLET] 90 tablet 3     Sig: TAKE 1 TABLET (20 MG) BY MOUTH DAILY   Last Prescription Date:   11/2/21  Last Fill Qty/Refills:         90, R-3    Last Office Visit:              4/12/22   Future Office visit:           None    Stefanie Mae RN .............. 11/22/2022  12:11 PM

## 2022-12-05 ENCOUNTER — OFFICE VISIT (OUTPATIENT)
Dept: FAMILY MEDICINE | Facility: OTHER | Age: 47
End: 2022-12-05
Attending: FAMILY MEDICINE
Payer: COMMERCIAL

## 2022-12-05 VITALS
DIASTOLIC BLOOD PRESSURE: 74 MMHG | OXYGEN SATURATION: 99 % | BODY MASS INDEX: 37.54 KG/M2 | RESPIRATION RATE: 18 BRPM | HEART RATE: 64 BPM | SYSTOLIC BLOOD PRESSURE: 134 MMHG | HEIGHT: 70 IN | WEIGHT: 262.2 LBS | TEMPERATURE: 97.3 F

## 2022-12-05 DIAGNOSIS — M25.532 PAIN OF BOTH WRIST JOINTS: ICD-10-CM

## 2022-12-05 DIAGNOSIS — I25.119 CORONARY ARTERY DISEASE INVOLVING NATIVE CORONARY ARTERY OF NATIVE HEART WITH ANGINA PECTORIS (H): Primary | ICD-10-CM

## 2022-12-05 DIAGNOSIS — E11.9 TYPE 2 DIABETES MELLITUS WITHOUT COMPLICATION, WITHOUT LONG-TERM CURRENT USE OF INSULIN (H): ICD-10-CM

## 2022-12-05 DIAGNOSIS — I20.89 STABLE ANGINA (H): ICD-10-CM

## 2022-12-05 DIAGNOSIS — E78.2 MIXED HYPERLIPIDEMIA: ICD-10-CM

## 2022-12-05 DIAGNOSIS — Z12.5 SCREENING FOR PROSTATE CANCER: ICD-10-CM

## 2022-12-05 DIAGNOSIS — I10 ESSENTIAL HYPERTENSION: ICD-10-CM

## 2022-12-05 DIAGNOSIS — Z12.11 SPECIAL SCREENING FOR MALIGNANT NEOPLASMS, COLON: ICD-10-CM

## 2022-12-05 DIAGNOSIS — M25.531 PAIN OF BOTH WRIST JOINTS: ICD-10-CM

## 2022-12-05 PROBLEM — R53.83 FATIGUE, UNSPECIFIED TYPE: Status: RESOLVED | Noted: 2022-11-22 | Resolved: 2022-12-05

## 2022-12-05 LAB
ALBUMIN SERPL BCG-MCNC: 4.7 G/DL (ref 3.5–5.2)
ALP SERPL-CCNC: 93 U/L (ref 40–129)
ALT SERPL W P-5'-P-CCNC: 20 U/L (ref 10–50)
ANION GAP SERPL CALCULATED.3IONS-SCNC: 11 MMOL/L (ref 7–15)
AST SERPL W P-5'-P-CCNC: 17 U/L (ref 10–50)
BILIRUB SERPL-MCNC: 0.4 MG/DL
BUN SERPL-MCNC: 27.5 MG/DL (ref 6–20)
CALCIUM SERPL-MCNC: 9.3 MG/DL (ref 8.6–10)
CHLORIDE SERPL-SCNC: 101 MMOL/L (ref 98–107)
CREAT SERPL-MCNC: 1.4 MG/DL (ref 0.67–1.17)
DEPRECATED HCO3 PLAS-SCNC: 26 MMOL/L (ref 22–29)
GFR SERPL CREATININE-BSD FRML MDRD: 62 ML/MIN/1.73M2
GLUCOSE SERPL-MCNC: 189 MG/DL (ref 70–99)
HBA1C MFR BLD: 8 % (ref 4–6.2)
POTASSIUM SERPL-SCNC: 4.5 MMOL/L (ref 3.4–5.3)
PROT SERPL-MCNC: 6.8 G/DL (ref 6.4–8.3)
SODIUM SERPL-SCNC: 138 MMOL/L (ref 136–145)

## 2022-12-05 PROCEDURE — 36415 COLL VENOUS BLD VENIPUNCTURE: CPT | Mod: ZL | Performed by: FAMILY MEDICINE

## 2022-12-05 PROCEDURE — 82040 ASSAY OF SERUM ALBUMIN: CPT | Mod: ZL | Performed by: FAMILY MEDICINE

## 2022-12-05 PROCEDURE — 99396 PREV VISIT EST AGE 40-64: CPT | Performed by: FAMILY MEDICINE

## 2022-12-05 PROCEDURE — 83036 HEMOGLOBIN GLYCOSYLATED A1C: CPT | Mod: ZL | Performed by: FAMILY MEDICINE

## 2022-12-05 PROCEDURE — 80053 COMPREHEN METABOLIC PANEL: CPT | Mod: ZL | Performed by: FAMILY MEDICINE

## 2022-12-05 RX ORDER — ISOSORBIDE MONONITRATE 60 MG/1
TABLET, EXTENDED RELEASE ORAL
COMMUNITY
Start: 2022-09-24

## 2022-12-05 RX ORDER — MELOXICAM 15 MG/1
15 TABLET ORAL DAILY
Qty: 90 TABLET | Refills: 3 | Status: SHIPPED | OUTPATIENT
Start: 2022-12-05 | End: 2023-12-15

## 2022-12-05 RX ORDER — GLIPIZIDE 10 MG/1
10 TABLET, FILM COATED, EXTENDED RELEASE ORAL DAILY
Qty: 90 TABLET | Refills: 3 | Status: SHIPPED | OUTPATIENT
Start: 2022-12-05 | End: 2022-12-06

## 2022-12-05 RX ORDER — METHOCARBAMOL 750 MG/1
TABLET, FILM COATED ORAL
COMMUNITY
Start: 2022-04-27

## 2022-12-05 RX ORDER — OXYCODONE HYDROCHLORIDE 5 MG/1
TABLET ORAL
COMMUNITY
Start: 2022-04-27 | End: 2022-12-05

## 2022-12-05 ASSESSMENT — ENCOUNTER SYMPTOMS
NAUSEA: 1
FREQUENCY: 0
HEMATOCHEZIA: 0
DIZZINESS: 1
CONSTIPATION: 0
DYSURIA: 0
DIARRHEA: 0
COUGH: 0
JOINT SWELLING: 1
HEADACHES: 1
SORE THROAT: 0
ARTHRALGIAS: 1
HEARTBURN: 0
PARESTHESIAS: 1
SHORTNESS OF BREATH: 1
HEMATURIA: 0
WEAKNESS: 1
EYE PAIN: 1
ABDOMINAL PAIN: 0
PALPITATIONS: 0
NERVOUS/ANXIOUS: 1
MYALGIAS: 1
CHILLS: 1
FEVER: 0

## 2022-12-05 ASSESSMENT — ASTHMA QUESTIONNAIRES
QUESTION_2 LAST FOUR WEEKS HOW OFTEN HAVE YOU HAD SHORTNESS OF BREATH: THREE TO SIX TIMES A WEEK
QUESTION_5 LAST FOUR WEEKS HOW WOULD YOU RATE YOUR ASTHMA CONTROL: SOMEWHAT CONTROLLED
ACT_TOTALSCORE: 19
QUESTION_3 LAST FOUR WEEKS HOW OFTEN DID YOUR ASTHMA SYMPTOMS (WHEEZING, COUGHING, SHORTNESS OF BREATH, CHEST TIGHTNESS OR PAIN) WAKE YOU UP AT NIGHT OR EARLIER THAN USUAL IN THE MORNING: NOT AT ALL
QUESTION_1 LAST FOUR WEEKS HOW MUCH OF THE TIME DID YOUR ASTHMA KEEP YOU FROM GETTING AS MUCH DONE AT WORK, SCHOOL OR AT HOME: SOME OF THE TIME
QUESTION_4 LAST FOUR WEEKS HOW OFTEN HAVE YOU USED YOUR RESCUE INHALER OR NEBULIZER MEDICATION (SUCH AS ALBUTEROL): NOT AT ALL
ACT_TOTALSCORE: 19

## 2022-12-05 ASSESSMENT — PATIENT HEALTH QUESTIONNAIRE - PHQ9
SUM OF ALL RESPONSES TO PHQ QUESTIONS 1-9: 21
10. IF YOU CHECKED OFF ANY PROBLEMS, HOW DIFFICULT HAVE THESE PROBLEMS MADE IT FOR YOU TO DO YOUR WORK, TAKE CARE OF THINGS AT HOME, OR GET ALONG WITH OTHER PEOPLE: VERY DIFFICULT
SUM OF ALL RESPONSES TO PHQ QUESTIONS 1-9: 21

## 2022-12-05 ASSESSMENT — PAIN SCALES - GENERAL: PAINLEVEL: EXTREME PAIN (9)

## 2022-12-05 NOTE — PROGRESS NOTES
SUBJECTIVE:   CC: Ben is an 47 year old who presents for preventative health visit.   Patient has been advised of split billing requirements and indicates understanding: Yes     He has a history of stable angina, morbid obesity, hyperlipidemia, uncontrolled blood pressure, controlled diabetes, sleep apnea.  He was seen by cardiology at the end of November.  He was recently seen by cardiology who recommended follow-up on 1 year.    He has a history of poorly controlled diabetes.  He continues on glipizide twice daily.  He has not tolerated metformin stopped at a most a year ago.  He checks his blood pressures occasionally and they are generally in the 200s to 300s.  He is had no hypoglycemic episodes.    Healthy Habits:     Getting at least 3 servings of Calcium per day:  NO    Bi-annual eye exam:  Yes    Dental care twice a year:  NO    Sleep apnea or symptoms of sleep apnea:  Daytime drowsiness, Excessive snoring and Sleep apnea    Diet:  Low salt, Low fat/cholesterol and Diabetic    Frequency of exercise:  2-3 days/week    Duration of exercise:  15-30 minutes    Taking medications regularly:  Yes    Medication side effects:  Other    PHQ-2 Total Score: 6    Additional concerns today:  No      Today's PHQ-2 Score:   PHQ-2 ( 1999 [employer]) 12/5/2022   Q1: Little interest or pleasure in doing things 3   Q2: Feeling down, depressed or hopeless 3   PHQ-2 Score 6   PHQ-2 Total Score (12-17 Years)- Positive if 3 or more points; Administer PHQ-A if positive -   Q1: Little interest or pleasure in doing things Nearly every day   Q2: Feeling down, depressed or hopeless Nearly every day   PHQ-2 Score 6       Have you ever done Advance Care Planning? (For example, a Health Directive, POLST, or a discussion with a medical provider or your loved ones about your wishes): patient has declined paperwork    Social History     Tobacco Use     Smoking status: Former     Packs/day: 0.00     Years: 20.00     Pack years: 0.00     Types:  "Cigarettes     Quit date: 2013     Years since quittin.3     Smokeless tobacco: Never   Substance Use Topics     Alcohol use: Yes     Comment: Alcoholic Drinks/day: occasionally-rare 4 tomes a year         Alcohol Use 2022   Prescreen: >3 drinks/day or >7 drinks/week? Yes   AUDIT SCORE  9       Last PSA: No results found for: PSA    Reviewed orders with patient. Reviewed health maintenance and updated orders accordingly - Yes    Reviewed and updated as needed this visit by clinical staff   Tobacco  Allergies  Meds      Soc Hx        Reviewed and updated as needed this visit by Provider                     Review of Systems   Constitutional: Positive for chills. Negative for fever.   HENT: Positive for ear pain and hearing loss. Negative for congestion and sore throat.    Eyes: Positive for pain and visual disturbance.   Respiratory: Positive for shortness of breath. Negative for cough.    Cardiovascular: Positive for chest pain and peripheral edema. Negative for palpitations.   Gastrointestinal: Positive for nausea. Negative for abdominal pain, constipation, diarrhea, heartburn and hematochezia.   Genitourinary: Positive for impotence. Negative for dysuria, frequency, genital sores, hematuria, penile discharge and urgency.   Musculoskeletal: Positive for arthralgias, joint swelling and myalgias.   Skin: Negative for rash.   Neurological: Positive for dizziness, weakness, headaches and paresthesias.   Psychiatric/Behavioral: Positive for mood changes. The patient is nervous/anxious.        OBJECTIVE:   /74 (BP Location: Right arm, Patient Position: Sitting, Cuff Size: Adult Regular)   Pulse 64   Temp 97.3  F (36.3  C) (Tympanic)   Resp 18   Ht 1.778 m (5' 10\")   Wt 118.9 kg (262 lb 3.2 oz)   SpO2 99%   BMI 37.62 kg/m      Physical Exam  GENERAL: healthy, alert and no distress  RESP: lungs clear to auscultation - no rales, rhonchi or wheezes  CV: regular rate and rhythm, normal S1 S2, no " S3 or S4, no murmur, click or rub, no peripheral edema and peripheral pulses strong  MS: no gross musculoskeletal defects noted, no edema  SKIN: no suspicious lesions or rashes  NEURO: Normal strength and tone, mentation intact and speech normal  PSYCH: mentation appears normal, affect normal/bright    Diagnostic Test Results:  none     ASSESSMENT/PLAN:       ICD-10-CM    1. Coronary artery disease involving native coronary artery of native heart with angina pectoris (H)  I25.119       2. Pain of both wrist joints  M25.531 meloxicam (MOBIC) 15 MG tablet    M25.532       3. Stable angina (H)  I20.8       4. Type 2 diabetes mellitus without complication, without long-term current use of insulin (H)  E11.9 glipiZIDE (GLUCOTROL XL) 10 MG 24 hr tablet     Hemoglobin A1c     Comprehensive Metabolic Panel     Comprehensive Metabolic Panel     Hemoglobin A1c      5. Mixed hyperlipidemia  E78.2       6. Essential hypertension  I10       7. Screening for prostate cancer  Z12.5       8. Special screening for malignant neoplasms, colon  Z12.11 Colonoscopy Screening  Referral        In regards to his diabetes we will update hemoglobin A1c.  If this is elevated we will have him stop glipizide and start Ozempic weekly injections.  He will follow-up in 1 month after checking his blood pressure at home.    Will refer for colonoscopy, he is currently optimized for his upcoming procedure and recently was seen by cardiology.    Blood pressure is controlled, continue current regimen.    Declined COVID booster and flu shot.    Patient has been advised of split billing requirements and indicates understanding: Yes      COUNSELING:   Reviewed preventive health counseling, as reflected in patient instructions       Regular exercise       Healthy diet/nutrition       Vision screening       Colorectal cancer screening      BMI:   Estimated body mass index is 37.62 kg/m  as calculated from the following:    Height as of this  "encounter: 1.778 m (5' 10\").    Weight as of this encounter: 118.9 kg (262 lb 3.2 oz).   Weight management plan: Discussed healthy diet and exercise guidelines      He reports that he quit smoking about 9 years ago. His smoking use included cigarettes. He has never used smokeless tobacco.            Sg Szymanski MD  [facility]  Answers for HPI/ROS submitted by the patient on 12/5/2022  If you checked off any problems, how difficult have these problems made it for you to do your work, take care of things at home, or get along with other people?: Very difficult  PHQ9 TOTAL SCORE: 21      "

## 2022-12-05 NOTE — NURSING NOTE
"Chief Complaint   Patient presents with     Physical     Annual well visit       Initial /74 (BP Location: Right arm, Patient Position: Sitting, Cuff Size: Adult Regular)   Pulse 64   Temp 97.3  F (36.3  C) (Tympanic)   Resp 18   Ht 1.778 m (5' 10\")   Wt 118.9 kg (262 lb 3.2 oz)   SpO2 99%   BMI 37.62 kg/m   Estimated body mass index is 37.62 kg/m  as calculated from the following:    Height as of this encounter: 1.778 m (5' 10\").    Weight as of this encounter: 118.9 kg (262 lb 3.2 oz).     Medication Reconciliation: complete      FOOD SECURITY SCREENING QUESTIONS:    The next two questions are to help us understand your food security.  If you are feeling you need any assistance in this area, we have resources available to support you today.    Hunger Vital Signs:  Within the past 12 months we worried whether our food would run out before we got money to buy more. Never  Within the past 12 months the food we bought just didn't last and we didn't have money to get more. Never        Advance care plan reviewed      Maryse Javed LPN on 12/5/2022 at 1:08 PM      "

## 2022-12-07 ENCOUNTER — TELEPHONE (OUTPATIENT)
Dept: FAMILY MEDICINE | Facility: OTHER | Age: 47
End: 2022-12-07

## 2022-12-07 NOTE — TELEPHONE ENCOUNTER
"Fax received from [id number] of [place] with message, regarding:    semaglutide (OZEMPIC) 2 MG/1.5ML SOPN pen 1.5 mL 11 12/6/2022  --   Sig - Route: Inject 0.5 mg Subcutaneous every 7 days - Subcutaneous     \"This drug isn't available.\"    In clinical absence of patient's primary, Sg Szymanski, patient is requesting that this message be sent to the covering provider for consideration please.    Stefanie Mae RN .............. 12/7/2022  9:56 AM        "

## 2022-12-15 ENCOUNTER — TELEPHONE (OUTPATIENT)
Dept: FAMILY MEDICINE | Facility: OTHER | Age: 47
End: 2022-12-15

## 2022-12-15 DIAGNOSIS — E11.9 TYPE 2 DIABETES MELLITUS WITHOUT COMPLICATION, WITHOUT LONG-TERM CURRENT USE OF INSULIN (H): Primary | ICD-10-CM

## 2022-12-15 NOTE — TELEPHONE ENCOUNTER
I received a call from Eva at [facility] regarding RX for Ozempic. This is non- formulary and here is a list of medications that formulary.   BYDUREON BCISE (subcutaneous)  BYETTA PENS (subcutaneous)  JANUMET (ORAL)  JANUVIA (ORAL)  JENTADUETO (ORAL)  KOMBIGLYZE XR (ORAL)  ONGLYZA (ORAL)  SYMLIN PENS (subcutaneous)  TAADJENTA (ORAL)  VICTOZA (subcutaneous)  She would also like to know if patient has tried any of these and had contraindications.  Thank you!  Jayshree Israel on 12/15/2022 at 12:39 PM

## 2022-12-16 RX ORDER — LIRAGLUTIDE 6 MG/ML
0.6 INJECTION SUBCUTANEOUS DAILY
Qty: 3 ML | Refills: 3 | Status: SHIPPED | OUTPATIENT
Start: 2022-12-16 | End: 2023-06-21

## 2022-12-16 NOTE — TELEPHONE ENCOUNTER
Victoza 0.6 mg injected daily was sent to his pharmacy.  He should follow-up in 1 month for reassessment.

## 2023-01-05 ENCOUNTER — OFFICE VISIT (OUTPATIENT)
Dept: ORTHOPEDICS | Facility: OTHER | Age: 48
End: 2023-01-05
Attending: STUDENT IN AN ORGANIZED HEALTH CARE EDUCATION/TRAINING PROGRAM
Payer: COMMERCIAL

## 2023-01-05 ENCOUNTER — TELEPHONE (OUTPATIENT)
Dept: FAMILY MEDICINE | Facility: OTHER | Age: 48
End: 2023-01-05

## 2023-01-05 DIAGNOSIS — Z98.1 S/P CERVICAL SPINAL FUSION: Primary | ICD-10-CM

## 2023-01-05 PROCEDURE — 99212 OFFICE O/P EST SF 10 MIN: CPT | Performed by: STUDENT IN AN ORGANIZED HEALTH CARE EDUCATION/TRAINING PROGRAM

## 2023-01-05 PROCEDURE — G0463 HOSPITAL OUTPT CLINIC VISIT: HCPCS

## 2023-01-05 NOTE — TELEPHONE ENCOUNTER
Called and spoke with patient letting him know that he can not get the weekly injections due to availability. He will start the daily injections and follow up with  on 01/19/2023 appointment. Carol Ann Powers LPN .......................1/5/2023  4:49 PM

## 2023-01-05 NOTE — TELEPHONE ENCOUNTER
Patient was seen in clinic by ortho today and brought in a pile of empty pill bottles requesting refills on them. Also said that his insulin that was prescribed in December was the daily injection and he wants the weekly injection. So he will need a refill on the glipizide because he is suppose to take the glipizide until he gets the injection. Please call  Gay Mckeon LPN .....................1/5/2023 2:01 PM

## 2023-01-05 NOTE — PROGRESS NOTES
Patient is here for follow up on his cervical spine fusion.  Gay Mckeon LPN .....................1/5/2023 1:49 PM

## 2023-01-05 NOTE — PROGRESS NOTES
Visit Date: 2023    HISTORY OF PRESENT ILLNESS:  Ben is a 47-year-old male who is now approximately 7.5-8 months out from a C5 to C6 anterior cervical diskectomy and fusion for myelopathy/radiculopathy.  He had initially done very well in the early postoperative period, but several months ago now had his head while playing with his daughter and has had a flare-up of his left sided neck and periscapular pain.  He has had a C7 to T1 interlaminar injection, which gave him a few days of pain relief, although now he specifically is dealing predominantly with lower neck pain.    PHYSICAL EXAMINATION:    GENERAL:  Well-developed, well-nourished.   MUSCULOSKELETAL/NEUROLOGIC:  He has 5/5 strength in bilateral deltoid, biceps, triceps, wrist extension, flexion, hand  and hand intrinsics.  He has normal sensation to light touch throughout bilateral upper extremities.    IMAGING:  Available for review today includes a CT scan of his cervical spine, which shows well decompressed spinal canal neural foramen at the level of C5 to C6 with evidence of early bony fusion through the interbody graft with no evidence of early hardware failure and no significant worsening of his degenerative changes at C6 to C7.    ASSESSMENT AND PLAN:  Ben is a 47-year-old male who is about 7 months out from a C5 to C6 anterior cervical diskectomy and fusion with ongoing left greater than right sided neck pain with some facet degenerative changes at C5 to C6 and C6 to C7.  He is going to be referred to Dr. Floyd for bilateral C5 to C6 and C6 to C7 facet injections and possible radiofrequency ablation.  Follow up with me on an as-needed basis.    This clinic visit took 15-29 minutes.    Marcial Ram MD        D: 2023   T: 2023   MT: BERNADETTE    Name:     BRODIE GILLIS  MRN:      [id number]        Account:    [id number]   :      1975           Visit Date: 2023     Document: [id number]

## 2023-01-09 ENCOUNTER — OFFICE VISIT (OUTPATIENT)
Dept: FAMILY MEDICINE | Facility: OTHER | Age: 48
End: 2023-01-09
Attending: FAMILY MEDICINE
Payer: COMMERCIAL

## 2023-01-09 VITALS
SYSTOLIC BLOOD PRESSURE: 130 MMHG | HEART RATE: 68 BPM | BODY MASS INDEX: 37.65 KG/M2 | OXYGEN SATURATION: 98 % | HEIGHT: 70 IN | RESPIRATION RATE: 20 BRPM | TEMPERATURE: 97.5 F | DIASTOLIC BLOOD PRESSURE: 70 MMHG | WEIGHT: 263 LBS

## 2023-01-09 DIAGNOSIS — E78.2 MIXED HYPERLIPIDEMIA: ICD-10-CM

## 2023-01-09 DIAGNOSIS — E11.9 TYPE 2 DIABETES MELLITUS WITHOUT COMPLICATION, WITHOUT LONG-TERM CURRENT USE OF INSULIN (H): Primary | ICD-10-CM

## 2023-01-09 DIAGNOSIS — E66.01 CLASS 2 SEVERE OBESITY DUE TO EXCESS CALORIES WITH SERIOUS COMORBIDITY AND BODY MASS INDEX (BMI) OF 37.0 TO 37.9 IN ADULT (H): ICD-10-CM

## 2023-01-09 DIAGNOSIS — E66.812 CLASS 2 SEVERE OBESITY DUE TO EXCESS CALORIES WITH SERIOUS COMORBIDITY AND BODY MASS INDEX (BMI) OF 37.0 TO 37.9 IN ADULT (H): ICD-10-CM

## 2023-01-09 DIAGNOSIS — I10 ESSENTIAL HYPERTENSION: ICD-10-CM

## 2023-01-09 PROCEDURE — G0463 HOSPITAL OUTPT CLINIC VISIT: HCPCS

## 2023-01-09 PROCEDURE — 99213 OFFICE O/P EST LOW 20 MIN: CPT | Performed by: FAMILY MEDICINE

## 2023-01-09 ASSESSMENT — ANXIETY QUESTIONNAIRES
4. TROUBLE RELAXING: NEARLY EVERY DAY
5. BEING SO RESTLESS THAT IT IS HARD TO SIT STILL: MORE THAN HALF THE DAYS
3. WORRYING TOO MUCH ABOUT DIFFERENT THINGS: NEARLY EVERY DAY
2. NOT BEING ABLE TO STOP OR CONTROL WORRYING: NEARLY EVERY DAY
GAD7 TOTAL SCORE: 17
GAD7 TOTAL SCORE: 17
IF YOU CHECKED OFF ANY PROBLEMS ON THIS QUESTIONNAIRE, HOW DIFFICULT HAVE THESE PROBLEMS MADE IT FOR YOU TO DO YOUR WORK, TAKE CARE OF THINGS AT HOME, OR GET ALONG WITH OTHER PEOPLE: VERY DIFFICULT
7. FEELING AFRAID AS IF SOMETHING AWFUL MIGHT HAPPEN: SEVERAL DAYS
6. BECOMING EASILY ANNOYED OR IRRITABLE: NEARLY EVERY DAY
GAD7 TOTAL SCORE: 17
7. FEELING AFRAID AS IF SOMETHING AWFUL MIGHT HAPPEN: SEVERAL DAYS
1. FEELING NERVOUS, ANXIOUS, OR ON EDGE: MORE THAN HALF THE DAYS
8. IF YOU CHECKED OFF ANY PROBLEMS, HOW DIFFICULT HAVE THESE MADE IT FOR YOU TO DO YOUR WORK, TAKE CARE OF THINGS AT HOME, OR GET ALONG WITH OTHER PEOPLE?: VERY DIFFICULT

## 2023-01-09 ASSESSMENT — ASTHMA QUESTIONNAIRES
QUESTION_1 LAST FOUR WEEKS HOW MUCH OF THE TIME DID YOUR ASTHMA KEEP YOU FROM GETTING AS MUCH DONE AT WORK, SCHOOL OR AT HOME: SOME OF THE TIME
ACT_TOTALSCORE: 18
ACT_TOTALSCORE: 18
QUESTION_2 LAST FOUR WEEKS HOW OFTEN HAVE YOU HAD SHORTNESS OF BREATH: ONCE A DAY
QUESTION_4 LAST FOUR WEEKS HOW OFTEN HAVE YOU USED YOUR RESCUE INHALER OR NEBULIZER MEDICATION (SUCH AS ALBUTEROL): NOT AT ALL
QUESTION_3 LAST FOUR WEEKS HOW OFTEN DID YOUR ASTHMA SYMPTOMS (WHEEZING, COUGHING, SHORTNESS OF BREATH, CHEST TIGHTNESS OR PAIN) WAKE YOU UP AT NIGHT OR EARLIER THAN USUAL IN THE MORNING: NOT AT ALL
QUESTION_5 LAST FOUR WEEKS HOW WOULD YOU RATE YOUR ASTHMA CONTROL: SOMEWHAT CONTROLLED

## 2023-01-09 ASSESSMENT — PATIENT HEALTH QUESTIONNAIRE - PHQ9
SUM OF ALL RESPONSES TO PHQ QUESTIONS 1-9: 14
SUM OF ALL RESPONSES TO PHQ QUESTIONS 1-9: 14
10. IF YOU CHECKED OFF ANY PROBLEMS, HOW DIFFICULT HAVE THESE PROBLEMS MADE IT FOR YOU TO DO YOUR WORK, TAKE CARE OF THINGS AT HOME, OR GET ALONG WITH OTHER PEOPLE: SOMEWHAT DIFFICULT

## 2023-01-09 ASSESSMENT — PAIN SCALES - GENERAL: PAINLEVEL: EXTREME PAIN (8)

## 2023-01-09 NOTE — PROGRESS NOTES
Assessment & Plan     Type 2 diabetes mellitus without complication, without long-term current use of insulin (H)  New prescription for insulin pen needles were sent to his pharmacy.  He brought in his pen today and we reviewed proper use.  He will follow-up in 1 month to see how his blood sugars are looking.  He will contact me if he has any questions prior to that.  - insulin pen needle (31G X 8 MM) 31G X 8 MM miscellaneous; 1 Box of 100 insulin pen needles to be dispensed with every insulin pen prescription    Essential hypertension  Controlled, continue current regimen    Mixed hyperlipidemia  Continue statin    Class 2 severe obesity due to excess calories with serious comorbidity and body mass index (BMI) of 37.0 to 37.9 in adult (H)  Hopefully he will experience and weight loss with Victoza.         Depression Screening Follow Up    PHQ 1/9/2023   PHQ-9 Total Score 14   Q9: Thoughts of better off dead/self-harm past 2 weeks Several days   F/U: Thoughts of suicide or self-harm No   F/U: Self harm-plan -   F/U: Self-harm action -   F/U: Safety concerns No         No follow-ups on file.    Sg Szymanski MD  [facility] is a 47 year old, presenting for the following health issues:  Recheck Medication (Victoza will need a needle ordered to use with Victoza if you want him to use it.)    He has a history of poorly controlled type 2 diabetes.  At his last visit we switched from glipizide to Victoza.  However he was not able to use this as he did not get any insulin pen needles with this.  He continues on metformin and completed his last prescription of glipizide several weeks ago.    History of Present Illness       Mental Health Follow-up:  Patient presents to follow-up on Depression & Anxiety.Patient's depression since last visit has been:  No change  The patient is not having other symptoms associated with depression.  Patient's anxiety since last visit has been:  No  "change  The patient is not having other symptoms associated with anxiety.  Any significant life events: relationship concerns, grief or loss and health concerns  Patient is not feeling anxious or having panic attacks.  Patient has no concerns about alcohol or drug use.    He eats 0-1 servings of fruits and vegetables daily.He consumes 3 sweetened beverage(s) daily.He exercises with enough effort to increase his heart rate 20 to 29 minutes per day.  He exercises with enough effort to increase his heart rate 5 days per week. He is missing 1 dose(s) of medications per week.  He is not taking prescribed medications regularly due to cost of medication and remembering to take.    Today's PHQ-9         PHQ-9 Total Score: 14    PHQ-9 Q9 Thoughts of better off dead/self-harm past 2 weeks :   Several days  Thoughts of suicide or self harm: (P) No  Self-harm Plan:     Self-harm Action:       Safety concerns for self or others: (P) No    How difficult have these problems made it for you to do your work, take care of things at home, or get along with other people: Somewhat difficult  Today's MARGY-7 Score: 17             Review of Systems         Objective    /70   Pulse 68   Temp 97.5  F (36.4  C) (Tympanic)   Resp 20   Ht 1.778 m (5' 10\")   Wt 119.3 kg (263 lb)   SpO2 98%   BMI 37.74 kg/m    Body mass index is 37.74 kg/m .  Physical Exam   GENERAL: healthy, alert and no distress                    "

## 2023-01-09 NOTE — NURSING NOTE
"Chief Complaint   Patient presents with     Recheck Medication     Victoza will need a needle ordered to use with Victoza if you want him to use it.         Initial /70   Pulse 68   Temp 97.5  F (36.4  C) (Tympanic)   Resp 20   Ht 1.778 m (5' 10\")   Wt 119.3 kg (263 lb)   SpO2 98%   BMI 37.74 kg/m   Estimated body mass index is 37.74 kg/m  as calculated from the following:    Height as of this encounter: 1.778 m (5' 10\").    Weight as of this encounter: 119.3 kg (263 lb).         Norma J. Gosselin, SHABBIR   "

## 2023-01-23 DIAGNOSIS — M54.50 LOW BACK PAIN WITHOUT SCIATICA, UNSPECIFIED BACK PAIN LATERALITY, UNSPECIFIED CHRONICITY: ICD-10-CM

## 2023-01-23 DIAGNOSIS — E11.9 TYPE 2 DIABETES MELLITUS WITHOUT COMPLICATION, WITHOUT LONG-TERM CURRENT USE OF INSULIN (H): ICD-10-CM

## 2023-01-27 NOTE — TELEPHONE ENCOUNTER
[id number] of [place] sent Rx request for the following:      Requested Prescriptions   Pending Prescriptions Disp Refills     gabapentin (NEURONTIN) 300 MG capsule [Pharmacy Med Name: GABAPENTIN 300MG CAPSULE] 180 capsule 0     Sig: TAKE 2 CAPSULES BY MOUTH THREE TIMES DAILY   Last Prescription Date:   9/27/22  Last Fill Qty/Refills:         180, R-0    Last Office Visit:              1/9/23   Future Office visit:             Next 5 appointments (look out 90 days)    Feb 13, 2023  3:40 PM  SHORT with Sg Szymanski MD  [facility] ([facility] ) [address]  [place] [id number] [phone]        Stefanie Mae RN .............. 1/27/2023  4:58 PM

## 2023-01-27 NOTE — TELEPHONE ENCOUNTER
[id number] of [place] sent Rx request for the following:      Requested Prescriptions   Pending Prescriptions Disp Refills     omeprazole (PRILOSEC) 40 MG DR capsule [Pharmacy Med Name: OMEPRAZOLE 40MG DR CAPSULE] 90 capsule 3     Sig: TAKE 1 CAPSULE BY MOUTH ONCE DAILY WITH FOOD       PPI Protocol Failed - 1/27/2023  4:37 PM        Failed - Not on Clopidogrel (unless Pantoprazole ordered)     Last Prescription Date:   1/19/22  Last Fill Qty/Refills:         90, R-3    Last Office Visit:              1/9/23   Future Office visit:           2/13/23    PAULINE MORALES RN on 1/27/2023 at 4:40 PM

## 2023-01-29 RX ORDER — GABAPENTIN 300 MG/1
CAPSULE ORAL
Qty: 180 CAPSULE | Refills: 11 | Status: SHIPPED | OUTPATIENT
Start: 2023-01-29 | End: 2024-07-29

## 2023-01-29 RX ORDER — OMEPRAZOLE 40 MG/1
CAPSULE, DELAYED RELEASE ORAL
Qty: 90 CAPSULE | Refills: 3 | Status: SHIPPED | OUTPATIENT
Start: 2023-01-29 | End: 2024-03-29

## 2023-02-13 ENCOUNTER — HOSPITAL ENCOUNTER (OUTPATIENT)
Dept: GENERAL RADIOLOGY | Facility: OTHER | Age: 48
Discharge: HOME | End: 2023-02-13
Attending: STUDENT IN AN ORGANIZED HEALTH CARE EDUCATION/TRAINING PROGRAM | Admitting: STUDENT IN AN ORGANIZED HEALTH CARE EDUCATION/TRAINING PROGRAM
Payer: COMMERCIAL

## 2023-02-13 DIAGNOSIS — Z98.1 S/P CERVICAL SPINAL FUSION: ICD-10-CM

## 2023-02-13 PROCEDURE — 250N000009 HC RX 250: Performed by: RADIOLOGY

## 2023-02-13 PROCEDURE — 64490 INJ PARAVERT F JNT C/T 1 LEV: CPT | Mod: LT

## 2023-02-13 PROCEDURE — 255N000002 HC RX 255 OP 636: Performed by: RADIOLOGY

## 2023-02-13 RX ORDER — BUPIVACAINE HYDROCHLORIDE 5 MG/ML
3 INJECTION, SOLUTION EPIDURAL; INTRACAUDAL ONCE
Status: COMPLETED | OUTPATIENT
Start: 2023-02-13 | End: 2023-02-13

## 2023-02-13 RX ORDER — LIDOCAINE HYDROCHLORIDE 10 MG/ML
3 INJECTION, SOLUTION INFILTRATION; PERINEURAL ONCE
Status: COMPLETED | OUTPATIENT
Start: 2023-02-13 | End: 2023-02-13

## 2023-02-13 RX ADMIN — LIDOCAINE HYDROCHLORIDE 3 ML: 10 INJECTION, SOLUTION INFILTRATION; PERINEURAL at 09:20

## 2023-02-13 RX ADMIN — BUPIVACAINE HYDROCHLORIDE 6 ML: 5 INJECTION, SOLUTION EPIDURAL; INTRACAUDAL; PERINEURAL at 09:21

## 2023-02-13 RX ADMIN — IOHEXOL 3 ML: 240 INJECTION, SOLUTION INTRATHECAL; INTRAVASCULAR; INTRAVENOUS; ORAL at 09:21

## 2023-02-21 ENCOUNTER — OFFICE VISIT (OUTPATIENT)
Dept: ORTHOPEDICS | Facility: OTHER | Age: 48
End: 2023-02-21
Attending: STUDENT IN AN ORGANIZED HEALTH CARE EDUCATION/TRAINING PROGRAM
Payer: COMMERCIAL

## 2023-02-21 DIAGNOSIS — Z98.1 S/P CERVICAL SPINAL FUSION: Primary | ICD-10-CM

## 2023-02-21 PROCEDURE — 99212 OFFICE O/P EST SF 10 MIN: CPT | Performed by: STUDENT IN AN ORGANIZED HEALTH CARE EDUCATION/TRAINING PROGRAM

## 2023-02-21 PROCEDURE — G0463 HOSPITAL OUTPT CLINIC VISIT: HCPCS

## 2023-02-21 NOTE — PROGRESS NOTES
Visit Date: 2023    HISTORY OF PRESENT ILLNESS:  Ben is a 47-year-old male who is approximately 8 months out from a C5 to C6 anterior cervical diskectomy and fusion.  Overall, he has had significant improvement in his upper extremity symptoms of myelopathy and radiculopathy, but he has had some chronic neck pain.  He was referred for left sided medial branch blocks at C5 to C6 and C6 to C7, which he said actually worsened his neck pain, and he is not interested in pursuing any more branch blocks.  He feels the majority of his pain is musculoskeletal in nature and in the early stages the healing was improved upon with muscle relaxants.    PHYSICAL EXAMINATION:    GENERAL:  Well-appearing, well-nourished.  MUSCULOSKELETAL NEUROLOGIC:  He has 5/5 strength in bilateral deltoid, biceps, triceps, wrist extension, flexion, hand , and hand intrinsics.  He has normal sensation to light touch throughout bilateral upper extremities.    IMAGING:  There is no new imaging available for review today.    ASSESSMENT AND PLAN:  Ben is a 47-year-old male 8 months out from a C5 to C6 anterior cervical diskectomy and fusion with overall improvement in his myelopathy and radiculopathy symptoms with ongoing neck pain.  At this point, we will trial an additional prescription of muscle relaxants to see if it helps with his musculoskeletal neck pain.  He was given a referral card to [facility] to discuss other nonsurgical treatment options for his neck pain and he will reach out to him if there are any ongoing issues after his muscle relaxant trial.  He can follow up with me on an as-needed basis.    This clinic visit took 15-29 minutes.    Marcial Ram MD        D: 2023   T: 2023   MT: ROWAN    Name:     BRODIE GILLIS  MRN:      [id number]        Account:    [id number]   :      1975           Visit Date: 2023     Document: [id number]

## 2023-02-21 NOTE — PROGRESS NOTES
Patient is here for follow up on his cervical spine.  Gay Mckeon LPN .....................2/21/2023 1:35 PM

## 2023-04-18 ENCOUNTER — OFFICE VISIT (OUTPATIENT)
Dept: FAMILY MEDICINE | Facility: OTHER | Age: 48
End: 2023-04-18
Payer: COMMERCIAL

## 2023-04-18 VITALS
TEMPERATURE: 98.1 F | RESPIRATION RATE: 16 BRPM | HEART RATE: 64 BPM | OXYGEN SATURATION: 98 % | BODY MASS INDEX: 37.65 KG/M2 | DIASTOLIC BLOOD PRESSURE: 80 MMHG | HEIGHT: 70 IN | SYSTOLIC BLOOD PRESSURE: 132 MMHG | WEIGHT: 263 LBS

## 2023-04-18 DIAGNOSIS — H61.22 IMPACTED CERUMEN OF LEFT EAR: Primary | ICD-10-CM

## 2023-04-18 DIAGNOSIS — H60.392 OTHER INFECTIVE ACUTE OTITIS EXTERNA OF LEFT EAR: ICD-10-CM

## 2023-04-18 PROCEDURE — G0463 HOSPITAL OUTPT CLINIC VISIT: HCPCS | Performed by: NURSE PRACTITIONER

## 2023-04-18 PROCEDURE — 69209 REMOVE IMPACTED EAR WAX UNI: CPT | Mod: LT | Performed by: NURSE PRACTITIONER

## 2023-04-18 PROCEDURE — 99213 OFFICE O/P EST LOW 20 MIN: CPT | Performed by: NURSE PRACTITIONER

## 2023-04-18 PROCEDURE — G0463 HOSPITAL OUTPT CLINIC VISIT: HCPCS | Mod: 25 | Performed by: NURSE PRACTITIONER

## 2023-04-18 RX ORDER — OFLOXACIN 3 MG/ML
5 SOLUTION AURICULAR (OTIC) 2 TIMES DAILY
Qty: 5 ML | Refills: 0 | Status: SHIPPED | OUTPATIENT
Start: 2023-04-18 | End: 2023-04-23

## 2023-04-18 ASSESSMENT — ENCOUNTER SYMPTOMS
FEVER: 0
RHINORRHEA: 0
HEADACHES: 0
CHILLS: 1
DIZZINESS: 0
SORE THROAT: 0
FACIAL ASYMMETRY: 0
SINUS PAIN: 0
NECK PAIN: 0
SINUS PRESSURE: 0
NECK STIFFNESS: 0

## 2023-04-18 ASSESSMENT — PAIN SCALES - GENERAL: PAINLEVEL: EXTREME PAIN (8)

## 2023-04-18 NOTE — NURSING NOTE
Chief Complaint   Patient presents with     Ear Problem     L ear pain ongoing - worsening x 3 days     Patient treating pain with other rx medications and covers ear with cotton for sensitivity.  Hx of ear problems. Patient has had this ear problem for 2 months - significantly worse for past 3 days.    Advanced Care Planning on file? no    Medication Review Completed: complete    FOOD SECURITY SCREENING QUESTIONS:    The next two questions are to help us understand your food security.  If you are feeling you need any assistance in this area, we have resources available to support you today.    Hunger Vital Signs:  Within the past 12 months we worried whether our food would run out before we got money to buy more. Never  Within the past 12 months the food we bought just didn't last and we didn't have money to get more. Never    Tara Kay LPN

## 2023-04-18 NOTE — PROGRESS NOTES
Assessment & Plan     ICD-10-CM    1. Impacted cerumen of left ear  H61.22       2. Other infective acute otitis externa of left ear  H60.392 ofloxacin (FLOXIN) 0.3 % otic solution      Vital signs stable. PE consistent with cerumen impaction of left ear. Ear flush performed today using warm tap water and was partially successful. Approximately 2-3 mm wax plug extracted from ear canal by using warm tap water flush and lighted curette. Cerumen adhered to TM, unable to visualize TM related to persistent wax blockage. Given patient's history of chronic ear problems, ofloxacin otic drops prescribed over debrox.     Recommend avoid using Qtip as can further push wax back into ears, avoid prolonged use of ear buds/hearing aids/ear plugs if possible. Also, can try hydrogen peroxide, use of debrox over the counter or other approved wax softening treatments. If you are attempting to flush ears at home avoid cold water as this will make you dizzy, recommend luke warm or body temperature water.     Patient is in agreement and understanding of the above treatment plan. All questions and concerns were addressed and answered to patient's satisfaction. AVS reviewed with patient.       Return if symptoms worsen or fail to improve.    MIGUEL Eason CNP  [facility] is a 47 year old, presenting for the following health issues:  Ear Problem (L ear pain ongoing - worsening x 3 days)      Patient presents with acute on chronic worsening left ear pain. Has been placing cotton in the ear to reduce sensitivity.   History of PE tube placement x 3 times.     Current Symptoms   Otalgia: Yes: left ear   Muffled Sounds/Change in Hearing:Yes:    Sensation of Fullness in Ear(s): Yes: left ear   Ringing in Ears/Tinnitus: No   Balance Changes: No   Dizziness: No   Headache: No   Additional Symptoms: No  Denies fevers, chills, persistent hearing loss, foul smelling odor from ear, drainage from  "ear, changes in vision, nausea, vomiting, diarrhea, chest pain, shortness of breath.                  Review of Systems   Constitutional: Positive for chills. Negative for fever.   HENT: Positive for ear discharge, ear pain and hearing loss. Negative for rhinorrhea, sinus pressure, sinus pain, sneezing and sore throat.    Musculoskeletal: Negative for neck pain and neck stiffness.   Neurological: Negative for dizziness, facial asymmetry and headaches.   All other systems reviewed and are negative.           Objective    /80 (BP Location: Right arm, Patient Position: Sitting, Cuff Size: Adult Large)   Pulse 64   Temp 98.1  F (36.7  C) (Tympanic)   Resp 16   Ht 1.778 m (5' 10\")   Wt 119.3 kg (263 lb)   SpO2 98%   BMI 37.74 kg/m    Body mass index is 37.74 kg/m .  Physical Exam  Vitals reviewed.   HENT:      Right Ear: Ear canal and external ear normal. No middle ear effusion. Tympanic membrane is scarred.      Left Ear: Decreased hearing noted. There is impacted cerumen.      Nose: No congestion or rhinorrhea.   Cardiovascular:      Rate and Rhythm: Normal rate.   Pulmonary:      Effort: Pulmonary effort is normal.   Musculoskeletal:         General: Normal range of motion.      Cervical back: Normal range of motion. No tenderness.   Lymphadenopathy:      Cervical: No cervical adenopathy.   Skin:     General: Skin is warm and dry.      Capillary Refill: Capillary refill takes less than 2 seconds.   Neurological:      Mental Status: He is oriented to person, place, and time.                            "

## 2023-06-19 DIAGNOSIS — E11.9 TYPE 2 DIABETES MELLITUS WITHOUT COMPLICATION, WITHOUT LONG-TERM CURRENT USE OF INSULIN (H): ICD-10-CM

## 2023-06-21 RX ORDER — LIRAGLUTIDE 6 MG/ML
INJECTION SUBCUTANEOUS
Qty: 3 ML | Refills: 3 | Status: SHIPPED | OUTPATIENT
Start: 2023-06-21 | End: 2024-07-11

## 2023-06-29 ENCOUNTER — TELEPHONE (OUTPATIENT)
Dept: FAMILY MEDICINE | Facility: OTHER | Age: 48
End: 2023-06-29
Payer: COMMERCIAL

## 2023-06-29 DIAGNOSIS — E11.9 TYPE 2 DIABETES MELLITUS WITHOUT COMPLICATION, WITHOUT LONG-TERM CURRENT USE OF INSULIN (H): Primary | ICD-10-CM

## 2023-06-29 NOTE — TELEPHONE ENCOUNTER
Reason for call: Request a lab order.    Order requested for what kind of lab? Diabetic check lab work    Who is your PCP? [facility]    Preferred method for responding to this message: Telephone Call    Phone number patient can be reached at: Cell number on file:    Telephone Information:   Mobile [phone]       If we cannot reach you directly, may we leave a detailed response at the number you provided? No     Patient has a lab and DM check scheduled for 07/07/2023.    Mary Hernandez on 6/29/2023 at 3:01 PM

## 2023-06-30 NOTE — TELEPHONE ENCOUNTER
Patient is scheduled for a diabetic check up and labs on 07/07/23 with Miranda Wilkes PA-C. Order pended.    Janeth Garcia LPN on 6/30/2023 at 4:20 PM

## 2023-10-04 DIAGNOSIS — I20.89 STABLE ANGINA (H): ICD-10-CM

## 2023-10-04 DIAGNOSIS — R94.39 ABNORMAL STRESS TEST: ICD-10-CM

## 2023-10-04 DIAGNOSIS — I10 ESSENTIAL HYPERTENSION: ICD-10-CM

## 2023-10-04 DIAGNOSIS — I25.118 CORONARY ARTERY DISEASE OF NATIVE ARTERY OF NATIVE HEART WITH STABLE ANGINA PECTORIS (H): ICD-10-CM

## 2023-10-04 DIAGNOSIS — Z95.5 HISTORY OF CORONARY ARTERY STENT PLACEMENT: ICD-10-CM

## 2023-10-04 DIAGNOSIS — Z95.5 STATUS POST INSERTION OF DRUG-ELUTING STENT INTO LEFT ANTERIOR DESCENDING (LAD) ARTERY: ICD-10-CM

## 2023-10-04 DIAGNOSIS — I25.119 CORONARY ARTERY DISEASE INVOLVING NATIVE CORONARY ARTERY OF NATIVE HEART WITH ANGINA PECTORIS (H): ICD-10-CM

## 2023-10-04 DIAGNOSIS — I25.10 CORONARY ARTERY DISEASE INVOLVING NATIVE CORONARY ARTERY OF NATIVE HEART WITHOUT ANGINA PECTORIS: ICD-10-CM

## 2023-10-04 DIAGNOSIS — Z98.890 S/P CARDIAC CATH: ICD-10-CM

## 2023-10-05 RX ORDER — AMLODIPINE BESYLATE 10 MG/1
10 TABLET ORAL DAILY
Qty: 90 TABLET | Refills: 3 | Status: SHIPPED | OUTPATIENT
Start: 2023-10-05 | End: 2024-07-29

## 2023-10-05 RX ORDER — CLOPIDOGREL BISULFATE 75 MG/1
75 TABLET ORAL DAILY
Qty: 90 TABLET | Refills: 3 | Status: SHIPPED | OUTPATIENT
Start: 2023-10-05 | End: 2024-07-29 | Stop reason: ALTCHOICE

## 2023-10-05 RX ORDER — ASPIRIN 81 MG/1
81 TABLET, CHEWABLE ORAL DAILY
Qty: 30 TABLET | Refills: 0 | Status: SHIPPED | OUTPATIENT
Start: 2023-10-05 | End: 2023-11-24

## 2023-10-05 RX ORDER — HYDROCHLOROTHIAZIDE 25 MG/1
25 TABLET ORAL DAILY
Qty: 90 TABLET | Refills: 3 | Status: SHIPPED | OUTPATIENT
Start: 2023-10-05 | End: 2024-07-29

## 2023-10-05 NOTE — TELEPHONE ENCOUNTER
"Requested Prescriptions   Pending Prescriptions Disp Refills    aspirin (ASA) 81 MG chewable tablet [Pharmacy Med Name: ASPIRIN 81MG CHEWABLE TABLET] 90 tablet 3     Sig: TAKE 1 TABLET (81 MG) BY MOUTH DAILY       Analgesics (Non-Narcotic Tylenol and ASA Only) Passed - 10/4/2023  4:36 PM        Passed - Recent (12 mo) or future (30 days) visit within the authorizing provider's specialty     Patient has had an office visit with the authorizing provider or a provider within the authorizing providers department within the previous 12 mos or has a future within next 30 days. See \"Patient Info\" tab in inbasket, or \"Choose Columns\" in Meds & Orders section of the refill encounter.              Passed - Patient is age 20 years or older     If ASA is flagged for ages under 20 years old. Forward to provider for confirmation Ryes Syndrome is not a concern.              Passed - Medication is active on med list          clopidogrel (PLAVIX) 75 MG tablet [Pharmacy Med Name: CLOPIDOGREL 75MG TABLET] 90 tablet 3     Sig: TAKE 1 TABLET (75 MG) BY MOUTH DAILY       Plavix Failed - 10/4/2023  4:36 PM        Failed - No active PPI on record unless is Protonix        Failed - Normal HGB on file in past 12 months     Recent Labs   Lab Test 04/12/22  1126   HGB 12.1*               Failed - Normal Platelets on file in past 12 months     Recent Labs   Lab Test 10/29/21  1134                  Passed - Recent (12 mo) or future (30 days) visit within the authorizing provider's specialty     Patient has had an office visit with the authorizing provider or a provider within the authorizing providers department within the previous 12 mos or has a future within next 30 days. See \"Patient Info\" tab in inbasket, or \"Choose Columns\" in Meds & Orders section of the refill encounter.              Passed - Medication is active on med list        Passed - Patient is age 18 or older          hydrochlorothiazide (HYDRODIURIL) 25 MG tablet " "[Pharmacy Med Name: HYDROCHLOROTHIAZIDE 25MG TAB] 90 tablet 3     Sig: TAKE 1 TABLET (25 MG) BY MOUTH DAILY       Diuretics (Including Combos) Protocol Failed - 10/4/2023  4:36 PM        Failed - Normal serum creatinine on file in past 12 months     Recent Labs   Lab Test 12/05/22  1334   CR 1.40*              Passed - Blood pressure under 140/90 in past 12 months     BP Readings from Last 3 Encounters:   04/18/23 132/80   01/09/23 130/70   12/05/22 134/74                 Passed - Recent (12 mo) or future (30 days) visit within the authorizing provider's specialty     Patient has had an office visit with the authorizing provider or a provider within the authorizing providers department within the previous 12 mos or has a future within next 30 days. See \"Patient Info\" tab in inbasket, or \"Choose Columns\" in Meds & Orders section of the refill encounter.              Passed - Medication is active on med list        Passed - Patient is age 18 or older        Passed - Normal serum potassium on file in past 12 months     Recent Labs   Lab Test 12/05/22  1334   POTASSIUM 4.5                    Passed - Normal serum sodium on file in past 12 months     Recent Labs   Lab Test 12/05/22  1334                   amLODIPine (NORVASC) 10 MG tablet [Pharmacy Med Name: AMLODIPINE 10MG TABLET] 90 tablet 3     Sig: TAKE 1 TABLET (10 MG) BY MOUTH DAILY       Calcium Channel Blockers Protocol  Failed - 10/4/2023  4:36 PM        Failed - Normal serum creatinine on file in past 12 months     Recent Labs   Lab Test 12/05/22  1334   CR 1.40*       Ok to refill medication if creatinine is low          Passed - Blood pressure under 140/90 in past 12 months     BP Readings from Last 3 Encounters:   04/18/23 132/80   01/09/23 130/70   12/05/22 134/74                 Passed - Recent (12 mo) or future (30 days) visit within the authorizing provider's specialty     Patient has had an office visit with the authorizing provider or a provider " "within the authorizing providers department within the previous 12 mos or has a future within next 30 days. See \"Patient Info\" tab in inbasket, or \"Choose Columns\" in Meds & Orders section of the refill encounter.              Passed - Medication is active on med list        Passed - Patient is age 18 or older           Plavix 75mg  Last Written Prescription Date:  9/22/22  Last Fill Quantity: 90,   # refills: 3    Hydrochlorothiazide 25mg  Last Written Prescription Date:  9/22/22  Last Fill Quantity: 90,   # refills: 3     Amlodipine 10mg  Last Written Prescription Date:  9/22/22  Last Fill Quantity: 90,   # refills: 3  Last Office Visit: 11/22/22  Future Office visit:    Next 5 appointments (look out 90 days)      Oct 24, 2023  4:00 PM  Office Visit with MIGUEL [facility] ([facility] ) [address]  [place] [address] [phone]     Routing refill request to provider for review/approval because:  Overdue for labs and abnormal labs    Unable to complete prescription refill per RN Medication Refill Policy.     Aspirin refilled per [facility] refill protocol.  Em Haywood RN ....................  10/5/2023   9:09 AM    "

## 2023-10-24 ENCOUNTER — APPOINTMENT (OUTPATIENT)
Dept: FAMILY MEDICINE | Facility: OTHER | Age: 48
End: 2023-10-24
Attending: NURSE PRACTITIONER
Payer: COMMERCIAL

## 2023-10-24 ENCOUNTER — HOSPITAL ENCOUNTER (OUTPATIENT)
Dept: GENERAL RADIOLOGY | Facility: OTHER | Age: 48
Discharge: HOME | End: 2023-10-24
Attending: NURSE PRACTITIONER
Payer: COMMERCIAL

## 2023-10-24 DIAGNOSIS — R52 PAIN: ICD-10-CM

## 2023-10-24 PROCEDURE — 73522 X-RAY EXAM HIPS BI 3-4 VIEWS: CPT

## 2023-10-24 PROCEDURE — 73521 X-RAY EXAM HIPS BI 2 VIEWS: CPT | Performed by: NURSE PRACTITIONER

## 2023-10-24 PROCEDURE — 99499 UNLISTED E&M SERVICE: CPT | Performed by: NURSE PRACTITIONER

## 2023-11-23 DIAGNOSIS — F41.9 ANXIETY: ICD-10-CM

## 2023-11-23 RX ORDER — CITALOPRAM HYDROBROMIDE 20 MG/1
20 TABLET ORAL DAILY
Qty: 90 TABLET | Refills: 3 | Status: SHIPPED | OUTPATIENT
Start: 2023-11-23 | End: 2024-07-29

## 2023-11-24 DIAGNOSIS — Z98.890 STATUS POST CORONARY ANGIOGRAM: ICD-10-CM

## 2023-11-24 DIAGNOSIS — I25.118 CORONARY ARTERY DISEASE OF NATIVE ARTERY OF NATIVE HEART WITH STABLE ANGINA PECTORIS (H): ICD-10-CM

## 2023-11-24 DIAGNOSIS — Z95.5 STATUS POST INSERTION OF DRUG-ELUTING STENT INTO LEFT ANTERIOR DESCENDING (LAD) ARTERY: ICD-10-CM

## 2023-11-24 DIAGNOSIS — R94.39 ABNORMAL STRESS TEST: ICD-10-CM

## 2023-11-24 DIAGNOSIS — Z95.5 HISTORY OF CORONARY ARTERY STENT PLACEMENT: ICD-10-CM

## 2023-11-24 DIAGNOSIS — I25.10 CORONARY ARTERY DISEASE INVOLVING NATIVE CORONARY ARTERY OF NATIVE HEART WITHOUT ANGINA PECTORIS: ICD-10-CM

## 2023-11-24 DIAGNOSIS — I20.89 STABLE ANGINA (H): ICD-10-CM

## 2023-11-24 DIAGNOSIS — Z98.890 S/P CARDIAC CATH: ICD-10-CM

## 2023-11-24 DIAGNOSIS — I25.119 CORONARY ARTERY DISEASE INVOLVING NATIVE CORONARY ARTERY OF NATIVE HEART WITH ANGINA PECTORIS (H): Primary | ICD-10-CM

## 2023-11-27 RX ORDER — ASPIRIN 81 MG/1
81 TABLET, CHEWABLE ORAL DAILY
Qty: 90 TABLET | Refills: 3 | Status: SHIPPED | OUTPATIENT
Start: 2023-11-27 | End: 2024-07-29

## 2023-11-27 NOTE — TELEPHONE ENCOUNTER
[facility] sent Rx request for the following:      Requested Prescriptions   Pending Prescriptions Disp Refills    aspirin (ASA) 81 MG chewable tablet 30 tablet 0     Sig: Take 1 tablet (81 mg) by mouth daily       Analgesics (Non-Narcotic Tylenol and ASA Only) Failed - 11/24/2023  2:14 PM   Last Prescription Date:   10/5/23  Last Fill Qty/Refills:         30, R-0    Last Office Visit:              11/22/22   Future Office visit:           none     Miranda Suarez RN on 11/27/2023 at 8:03 AM

## 2023-12-12 DIAGNOSIS — M25.532 PAIN OF BOTH WRIST JOINTS: ICD-10-CM

## 2023-12-12 DIAGNOSIS — M25.531 PAIN OF BOTH WRIST JOINTS: ICD-10-CM

## 2023-12-12 DIAGNOSIS — I20.89 STABLE ANGINA (H): ICD-10-CM

## 2023-12-12 DIAGNOSIS — I10 ESSENTIAL HYPERTENSION: ICD-10-CM

## 2023-12-12 DIAGNOSIS — I25.10 CORONARY ARTERY DISEASE INVOLVING NATIVE CORONARY ARTERY OF NATIVE HEART WITHOUT ANGINA PECTORIS: ICD-10-CM

## 2023-12-12 DIAGNOSIS — I25.118 CORONARY ARTERY DISEASE OF NATIVE ARTERY OF NATIVE HEART WITH STABLE ANGINA PECTORIS (H): ICD-10-CM

## 2023-12-13 RX ORDER — LISINOPRIL 40 MG/1
TABLET ORAL
Qty: 90 TABLET | Refills: 3 | Status: SHIPPED | OUTPATIENT
Start: 2023-12-13 | End: 2024-07-29

## 2023-12-13 RX ORDER — ISOSORBIDE MONONITRATE 120 MG/1
120 TABLET, EXTENDED RELEASE ORAL DAILY
Qty: 90 TABLET | Refills: 3 | Status: SHIPPED | OUTPATIENT
Start: 2023-12-13

## 2023-12-13 NOTE — TELEPHONE ENCOUNTER
"Requested Prescriptions   Pending Prescriptions Disp Refills    lisinopril (ZESTRIL) 40 MG tablet [Pharmacy Med Name: LISINOPRIL 40MG TABLET] 90 tablet 3     Sig: TAKE 1 TAB BY MOUTH ONCE DAILY       ACE Inhibitors (Including Combos) Protocol Failed - 12/12/2023  3:01 PM        Failed - Recent (12 mo) or future (30 days) visit within the authorizing provider's specialty     Patient has had an office visit with the authorizing provider or a provider within the authorizing providers department within the previous 12 mos or has a future within next 30 days. See \"Patient Info\" tab in inbasket, or \"Choose Columns\" in Meds & Orders section of the refill encounter.          Failed - Normal serum creatinine on file in past 12 months     Recent Labs   Lab Test 12/05/22  1334   CR 1.40*   Ok to refill medication if creatinine is low        Failed - Normal serum potassium on file in past 12 months     Recent Labs   Lab Test 12/05/22  1334   POTASSIUM 4.5           Passed - Blood pressure under 140/90 in past 12 months     BP Readings from Last 3 Encounters:   04/18/23 132/80   01/09/23 130/70   12/05/22 134/74           Passed - Medication is active on med list        Passed - Patient is age 18 or older                  isosorbide mononitrate CR (IMDUR) 120 MG 24 HR ER tablet [Pharmacy Med Name: ISOSORBIDE MONO 120MG ER TAB] 90 tablet 3     Sig: TAKE 1 TABLET (120 MG) BY MOUTH DAILY       Nitrates Failed - 12/12/2023  3:01 PM        Failed - Recent (12 mo) or future (30 days) visit within the authorizing provider's specialty     Patient has had an office visit with the authorizing provider or a provider within the authorizing providers department within the previous 12 mos or has a future within next 30 days. See \"Patient Info\" tab in inbasket, or \"Choose Columns\" in Meds & Orders section of the refill encounter.          Passed - Blood pressure under 140/90 in past 12 months     BP Readings from Last 3 Encounters:   04/18/23 " 132/80   01/09/23 130/70   12/05/22 134/74           Passed - Pt is not on erectile dysfunction medications        Passed - Medication is active on med list        Passed - Patient is age 18 or older     Lisinopril 40mg  Last Written Prescription Date:  9/22/22  Last Fill Quantity: 90,   # refills: 3    Isosorbide CR 120mg  Last Written Prescription Date:  9/22/22  Last Fill Quantity: 90,   # refills: 3  Last Office Visit: 11/22/22  Future Office visit:   none    Routing refill request to provider for review/approval because:  Overdue for office visit and lab    Unable to complete prescription refill per RN Medication Refill Policy.   Em Haywood RN ....................  12/13/2023   8:38 AM

## 2023-12-15 RX ORDER — MELOXICAM 15 MG/1
15 TABLET ORAL DAILY
Qty: 90 TABLET | Refills: 3 | Status: SHIPPED | OUTPATIENT
Start: 2023-12-15 | End: 2024-07-29

## 2023-12-15 NOTE — TELEPHONE ENCOUNTER
[id number] sent Rx request for the following:      Requested Prescriptions   Pending Prescriptions Disp Refills    meloxicam (MOBIC) 15 MG tablet [Pharmacy Med Name: MELOXICAM 15MG TABLET] 90 tablet 3     Sig: TAKE 1 TABLET (15 MG) BY MOUTH DAILY       NSAID Medications Failed - 12/15/2023  8:51 AM        Failed - Normal ALT on file in past 12 months     Recent Labs   Lab Test 12/05/22  1334 02/25/22  0936 11/14/17  0947   ALT 20   < >  --    GICHALT  --   --  44    < > = values in this interval not displayed.           Failed - Normal AST on file in past 12 months     Recent Labs   Lab Test 12/05/22  1334 02/25/22  0936 11/14/17  0947   AST 17   < >  --    GICHAST  --   --  22    < > = values in this interval not displayed.           Failed - Normal CBC on file in past 12 months     Recent Labs   Lab Test 04/12/22  1126 02/25/22  0936 10/29/21  1134   WBC  --   --  5.6   RBC  --   --  4.90   HGB 12.1*   < > 14.3   HCT  --   --  41.7   PLT  --   --  327    < > = values in this interval not displayed.           Failed - Normal serum creatinine on file in past 12 months     Recent Labs   Lab Test 12/05/22  1334   CR 1.40*   Ok to refill medication if creatinine is low     Last Prescription Date:   12/15/22  Last Fill Qty/Refills:         90, R-3    Last Office Visit:              1/9/23   Future Office visit:           None    Pt due for annual exam after 1/9/24. Routing to provider for refill consideration. Routing to Unit scheduling pool, to assist Pt in scheduling appointment.     Unable to complete prescription refill per RN Medication Refill Policy.     Stefanie Mae RN .............. 12/15/2023  8:55 AM

## 2024-01-28 DIAGNOSIS — I25.118 CORONARY ARTERY DISEASE OF NATIVE ARTERY OF NATIVE HEART WITH STABLE ANGINA PECTORIS (H): ICD-10-CM

## 2024-01-28 DIAGNOSIS — I10 ESSENTIAL HYPERTENSION: ICD-10-CM

## 2024-01-28 DIAGNOSIS — I20.89 STABLE ANGINA (H): ICD-10-CM

## 2024-01-30 RX ORDER — CARVEDILOL 25 MG/1
25 TABLET ORAL 2 TIMES DAILY WITH MEALS
Qty: 180 TABLET | Refills: 3 | Status: SHIPPED | OUTPATIENT
Start: 2024-01-30

## 2024-01-30 NOTE — TELEPHONE ENCOUNTER
Requested Prescriptions   Pending Prescriptions Disp Refills    carvedilol (COREG) 25 MG tablet [Pharmacy Med Name: CARVEDILOL 25MG TABLET] 180 tablet 3     Sig: TAKE 1 TABLET (25 MG) BY MOUTH 2 TIMES DAILY (WITH MEALS)       Beta-Blockers Protocol Failed - 1/28/2024  2:38 PM        Failed - Recent (12 mo) or future (30 days) visit within the authorizing provider's specialty     The patient must have completed an in-person or virtual visit within the past 12 months or has a future visit scheduled within the next 90 days with the authorizing provider s specialty.  Urgent care and e-visits do not quality as an office visit for this protocol.        Passed - Blood pressure under 140/90 in past 12 months     BP Readings from Last 3 Encounters:   04/18/23 132/80   01/09/23 130/70   12/05/22 134/74           Passed - Patient is age 6 or older        Passed - Medication is active on med list        Passed - Medication indicated for associated diagnosis     Medication is associated with one or more of the following diagnoses:     Hypertension (HTN)   Atrial fibrillation/flutter   Angina   ASCVD   Migraine   Heart Failure   Tremor   Anxiety   Ocular hypertension   Glaucoma     Last Written Prescription Date:  9/22/22  Last Fill Quantity: 180,   # refills: 3  Last Office Visit: 11/22/22  Future Office visit:    Next 5 appointments (look out 90 days)      Feb 19, 2024  9:20 AM  (Arrive by 9:05 AM)  PHYSICAL with Sg Szymanski MD  [facility] ([facility] ) [address]  [place] [address]  [phone]     Routing refill request to provider for review/approval because:  Overdue to be seen    Unable to complete prescription refill per RN Medication Refill Policy.   Em Haywood RN ....................  1/30/2024   11:20 AM

## 2024-02-01 NOTE — NURSING NOTE
Previous A1C is not at goal of <8  No results found for: A1C    11/14/17 10.7  Urine microalbumin:creatine: NA  Foot exam 2015  Eye exam NA    Tobacco User No  Patient is not on a daily aspirin  Patient is on a Statin.  Blood pressure today of:     BP Readings from Last 1 Encounters:   04/20/18 136/79      is at the goal of <139/89 for diabetics.    Ivonne Gay LPN on 4/20/2018 at 2:46 PM      
01-Feb-2024 16:20

## 2024-02-10 NOTE — TELEPHONE ENCOUNTER
Admit to OBGYN   Clear liquid diet   F/u T&S, CBC, RPR   IVF LR 125cc/hr   Continuous fetal monitoring and tocometry   Analgesia at maternal request   Vertex by TAUS  Induction plan mika after midnight   Left message for patient to call back.    Janeth Garcia LPN on 9/18/2020 at 3:28 PM

## 2024-03-08 ENCOUNTER — OFFICE VISIT (OUTPATIENT)
Dept: FAMILY MEDICINE | Facility: OTHER | Age: 49
End: 2024-03-08
Attending: STUDENT IN AN ORGANIZED HEALTH CARE EDUCATION/TRAINING PROGRAM
Payer: MEDICAID

## 2024-03-08 VITALS
OXYGEN SATURATION: 98 % | BODY MASS INDEX: 36.22 KG/M2 | RESPIRATION RATE: 20 BRPM | WEIGHT: 253 LBS | HEART RATE: 71 BPM | SYSTOLIC BLOOD PRESSURE: 162 MMHG | TEMPERATURE: 97.3 F | DIASTOLIC BLOOD PRESSURE: 80 MMHG | HEIGHT: 70 IN

## 2024-03-08 DIAGNOSIS — N52.9 VASCULOGENIC ERECTILE DYSFUNCTION, UNSPECIFIED VASCULOGENIC ERECTILE DYSFUNCTION TYPE: Primary | ICD-10-CM

## 2024-03-08 PROCEDURE — 99213 OFFICE O/P EST LOW 20 MIN: CPT | Performed by: STUDENT IN AN ORGANIZED HEALTH CARE EDUCATION/TRAINING PROGRAM

## 2024-03-08 PROCEDURE — G0463 HOSPITAL OUTPT CLINIC VISIT: HCPCS

## 2024-03-08 RX ORDER — TADALAFIL 20 MG/1
20 TABLET ORAL DAILY PRN
Qty: 30 TABLET | Refills: 3 | Status: SHIPPED | OUTPATIENT
Start: 2024-03-08

## 2024-03-08 ASSESSMENT — ASTHMA QUESTIONNAIRES
ACT_TOTALSCORE: 13
ACT_TOTALSCORE: 13
QUESTION_1 LAST FOUR WEEKS HOW MUCH OF THE TIME DID YOUR ASTHMA KEEP YOU FROM GETTING AS MUCH DONE AT WORK, SCHOOL OR AT HOME: SOME OF THE TIME
QUESTION_5 LAST FOUR WEEKS HOW WOULD YOU RATE YOUR ASTHMA CONTROL: POORLY CONTROLLED
QUESTION_3 LAST FOUR WEEKS HOW OFTEN DID YOUR ASTHMA SYMPTOMS (WHEEZING, COUGHING, SHORTNESS OF BREATH, CHEST TIGHTNESS OR PAIN) WAKE YOU UP AT NIGHT OR EARLIER THAN USUAL IN THE MORNING: TWO OR THREE NIGHTS A WEEK
QUESTION_4 LAST FOUR WEEKS HOW OFTEN HAVE YOU USED YOUR RESCUE INHALER OR NEBULIZER MEDICATION (SUCH AS ALBUTEROL): NOT AT ALL
QUESTION_2 LAST FOUR WEEKS HOW OFTEN HAVE YOU HAD SHORTNESS OF BREATH: MORE THAN ONCE A DAY

## 2024-03-08 ASSESSMENT — ANXIETY QUESTIONNAIRES
1. FEELING NERVOUS, ANXIOUS, OR ON EDGE: NEARLY EVERY DAY
5. BEING SO RESTLESS THAT IT IS HARD TO SIT STILL: MORE THAN HALF THE DAYS
GAD7 TOTAL SCORE: 16
4. TROUBLE RELAXING: NEARLY EVERY DAY
2. NOT BEING ABLE TO STOP OR CONTROL WORRYING: SEVERAL DAYS
IF YOU CHECKED OFF ANY PROBLEMS ON THIS QUESTIONNAIRE, HOW DIFFICULT HAVE THESE PROBLEMS MADE IT FOR YOU TO DO YOUR WORK, TAKE CARE OF THINGS AT HOME, OR GET ALONG WITH OTHER PEOPLE: SOMEWHAT DIFFICULT
7. FEELING AFRAID AS IF SOMETHING AWFUL MIGHT HAPPEN: SEVERAL DAYS
GAD7 TOTAL SCORE: 16
7. FEELING AFRAID AS IF SOMETHING AWFUL MIGHT HAPPEN: SEVERAL DAYS
8. IF YOU CHECKED OFF ANY PROBLEMS, HOW DIFFICULT HAVE THESE MADE IT FOR YOU TO DO YOUR WORK, TAKE CARE OF THINGS AT HOME, OR GET ALONG WITH OTHER PEOPLE?: SOMEWHAT DIFFICULT
6. BECOMING EASILY ANNOYED OR IRRITABLE: NEARLY EVERY DAY
3. WORRYING TOO MUCH ABOUT DIFFERENT THINGS: NEARLY EVERY DAY
GAD7 TOTAL SCORE: 16

## 2024-03-08 ASSESSMENT — PAIN SCALES - GENERAL: PAINLEVEL: MODERATE PAIN (5)

## 2024-03-08 ASSESSMENT — PATIENT HEALTH QUESTIONNAIRE - PHQ9
SUM OF ALL RESPONSES TO PHQ QUESTIONS 1-9: 20
10. IF YOU CHECKED OFF ANY PROBLEMS, HOW DIFFICULT HAVE THESE PROBLEMS MADE IT FOR YOU TO DO YOUR WORK, TAKE CARE OF THINGS AT HOME, OR GET ALONG WITH OTHER PEOPLE: VERY DIFFICULT
SUM OF ALL RESPONSES TO PHQ QUESTIONS 1-9: 20

## 2024-03-08 NOTE — PROGRESS NOTES
"  Assessment & Plan     Vasculogenic erectile dysfunction, unspecified vasculogenic erectile dysfunction type  With his cardic history and dm2 likely vascula related and less likely psychogenic. Trial cilais. Reviewed benefits vs risks and side effects of medication and patient in agreement to start taking.  Advised not to take at the same time as nitroglycerin or imdur due to hypotension risks   - tadalafil (ADCIRCA/CIALIS) 20 MG tablet; Take 1 tablet (20 mg) by mouth daily as needed (ED)    Advised to watch BP at home. Return to clinic if persistently elevated             BMI  Estimated body mass index is 36.3 kg/m  as calculated from the following:    Height as of this encounter: 1.778 m (5' 10\").    Weight as of this encounter: 114.8 kg (253 lb).       Depression Screening Follow Up        3/8/2024    12:25 PM   PHQ   PHQ-9 Total Score 20   Q9: Thoughts of better off dead/self-harm past 2 weeks Several days   F/U: Thoughts of suicide or self-harm No   F/U: Safety concerns No         3/8/2024    12:25 PM   Last PHQ-9   1.  Little interest or pleasure in doing things 2   2.  Feeling down, depressed, or hopeless 2   3.  Trouble falling or staying asleep, or sleeping too much 3   4.  Feeling tired or having little energy 3   5.  Poor appetite or overeating 3   6.  Feeling bad about yourself 2   7.  Trouble concentrating 3   8.  Moving slowly or restless 1   Q9: Thoughts of better off dead/self-harm past 2 weeks 1   PHQ-9 Total Score 20   In the past two weeks have you had thoughts of suicide or self harm? No   Do you have concerns about your personal safety or the safety of others? No           Subjective   Ben is a 48 year old, presenting for the following health issues:  Erectile Dysfunction    History of Present Illness       Reason for visit:  ED  Symptom onset:  More than a month  Symptoms include:  Dont get erections and keep them  Symptom intensity:  Severe  Symptom progression:  Worsening  Had these " "symptoms before:  Yes  Has tried/received treatment for these symptoms:  No  What makes it worse:  Mood change  What makes it better:  Not really    He eats 0-1 servings of fruits and vegetables daily.He consumes 2 sweetened beverage(s) daily.He exercises with enough effort to increase his heart rate 9 or less minutes per day.  He exercises with enough effort to increase his heart rate 7 days per week. He is missing 2 dose(s) of medications per week.  He is not taking prescribed medications regularly due to remembering to take.     Erectile dysfunction   - has been going on for months- years  - would like to try a medication       HTN  - elevated today, normal at home when he checks and previous normal in clinic.  - has not taken meds today                    Objective    BP (!) 162/80 (BP Location: Right arm, Patient Position: Sitting, Cuff Size: Adult Large)   Pulse 71   Temp 97.3  F (36.3  C) (Tympanic)   Resp 20   Ht 1.778 m (5' 10\")   Wt 114.8 kg (253 lb)   SpO2 98%   BMI 36.30 kg/m    Body mass index is 36.3 kg/m .  Physical Exam   GENERAL: alert and no distress  PSYCH: mentation appears normal, affect normal/bright            Signed Electronically by: Hali Medel MD    "

## 2024-03-08 NOTE — NURSING NOTE
"Medication Reconciliation: Complete    Stefanie Paris LPN  3/8/2024 12:46 PM  Chief Complaint   Patient presents with    Erectile Dysfunction       Initial BP (!) 162/80 (BP Location: Right arm, Patient Position: Sitting, Cuff Size: Adult Large)   Pulse 71   Temp 97.3  F (36.3  C) (Tympanic)   Resp 20   Ht 1.778 m (5' 10\")   Wt 114.8 kg (253 lb)   SpO2 98%   BMI 36.30 kg/m   Estimated body mass index is 36.3 kg/m  as calculated from the following:    Height as of this encounter: 1.778 m (5' 10\").    Weight as of this encounter: 114.8 kg (253 lb).  Medication Review: complete    The next two questions are to help us understand your food security.  If you are feeling you need any assistance in this area, we have resources available to support you today.          3/8/2024   SDOH- Food Insecurity   Within the past 12 months, did you worry that your food would run out before you got money to buy more? Pt Declined   Within the past 12 months, did the food you bought just not last and you didn t have money to get more? Pt Declined         Health Care Directive:  Patient does not have a Health Care Directive or Living Will: Discussed advance care planning with patient; however, patient declined at this time.    Stefanie Paris LPN      "

## 2024-03-08 NOTE — LETTER
My Asthma Action Plan    Name: Herb Figueroa   YOB: 1975  Date: 3/8/2024   My doctor: Hali Medel MD   My clinic: [facility]        My Rescue Medicine:   Albuterol inhaler (Proair/Ventolin/Proventil HFA)  2-4 puffs EVERY 4 HOURS as needed. Use a spacer if recommended by your provider.   My Asthma Severity:   Intermittent / Exercise Induced  Know your asthma triggers: smoke, dust mites, strong odors and fumes, exercise or sports, and Heat             GREEN ZONE   Good Control  I feel good  No cough or wheeze  Can work, sleep and play without asthma symptoms       Take your asthma control medicine every day.     If exercise triggers your asthma, take your rescue medication  15 minutes before exercise or sports, and  During exercise if you have asthma symptoms  Spacer to use with inhaler: If you have a spacer, make sure to use it with your inhaler             YELLOW ZONE Getting Worse  I have ANY of these:  I do not feel good  Cough or wheeze  Chest feels tight  Wake up at night   Keep taking your Green Zone medications  Start taking your rescue medicine:  every 20 minutes for up to 1 hour. Then every 4 hours for 24-48 hours.  If you stay in the Yellow Zone for more than 12-24 hours, contact your doctor.  If you do not return to the Green Zone in 12-24 hours or you get worse, start taking your oral steroid medicine if prescribed by your provider.           RED ZONE Medical Alert - Get Help  I have ANY of these:  I feel awful  Medicine is not helping  Breathing getting harder  Trouble walking or talking  Nose opens wide to breathe       Take your rescue medicine NOW  If your provider has prescribed an oral steroid medicine, start taking it NOW  Call your doctor NOW  If you are still in the Red Zone after 20 minutes and you have not reached your doctor:  Take your rescue medicine again and  Call 911 or go to the emergency room right away    See your regular doctor within  2 weeks of an Emergency Room or Urgent Care visit for follow-up treatment.          Annual Reminders:  Meet with Asthma Educator,  Flu Shot in the Fall, consider Pneumonia Vaccination for patients with asthma (aged 19 and older).    Pharmacy: [id number] - [place], [place] - [address]    Electronically signed by Hali Medel MD   Date: 03/08/24                    Asthma Triggers  How To Control Things That Make Your Asthma Worse    Triggers are things that make your asthma worse.  Look at the list below to help you find your triggers and   what you can do about them. You can help prevent asthma flare-ups by staying away from your triggers.      Trigger                                                          What you can do   Cigarette Smoke  Tobacco smoke can make asthma worse. Do not allow smoking in your home, car or around you.  Be sure no one smokes at a child s day care or school.  If you smoke, ask your health care provider for ways to help you quit.  Ask family members to quit too.  Ask your health care provider for a referral to Quit Plan to help you quit smoking, or call 0-477-553-PLAN.     Colds, Flu, Bronchitis  These are common triggers of asthma. Wash your hands often.  Don t touch your eyes, nose or mouth.  Get a flu shot every year.     Dust Mites  These are tiny bugs that live in cloth or carpet. They are too small to see. Wash sheets and blankets in hot water every week.   Encase pillows and mattress in dust mite proof covers.  Avoid having carpet if you can. If you have carpet, vacuum weekly.   Use a dust mask and HEPA vacuum.   Pollen and Outdoor Mold  Some people are allergic to trees, grass, or weed pollen, or molds. Try to keep your windows closed.  Limit time out doors when pollen count is high.   Ask you health care provider about taking medicine during allergy season.     Animal Dander  Some people are allergic to skin flakes, urine or saliva from pets  with fur or feathers. Keep pets with fur or feathers out of your home.    If you can t keep the pet outdoors, then keep the pet out of your bedroom.  Keep the bedroom door closed.  Keep pets off cloth furniture and away from stuffed toys.     Mice, Rats, and Cockroaches  Some people are allergic to the waste from these pests.   Cover food and garbage.  Clean up spills and food crumbs.  Store grease in the refrigerator.   Keep food out of the bedroom.   Indoor Mold  This can be a trigger if your home has high moisture. Fix leaking faucets, pipes, or other sources of water.   Clean moldy surfaces.  Dehumidify basement if it is damp and smelly.   Smoke, Strong Odors, and Sprays  These can reduce air quality. Stay away from strong odors and sprays, such as perfume, powder, hair spray, paints, smoke incense, paint, cleaning products, candles and new carpet.   Exercise or Sports  Some people with asthma have this trigger. Be active!  Ask your doctor about taking medicine before sports or exercise to prevent symptoms.    Warm up for 5-10 minutes before and after sports or exercise.     Other Triggers of Asthma  Cold air:  Cover your nose and mouth with a scarf.  Sometimes laughing or crying can be a trigger.  Some medicines and food can trigger asthma.

## 2024-03-12 ENCOUNTER — TRANSFERRED RECORDS (OUTPATIENT)
Dept: HEALTH INFORMATION MANAGEMENT | Facility: OTHER | Age: 49
End: 2024-03-12
Payer: MEDICAID

## 2024-03-12 LAB — RETINOPATHY: POSITIVE

## 2024-03-26 DIAGNOSIS — E11.9 TYPE 2 DIABETES MELLITUS WITHOUT COMPLICATION, WITHOUT LONG-TERM CURRENT USE OF INSULIN (H): ICD-10-CM

## 2024-03-29 RX ORDER — OMEPRAZOLE 40 MG/1
CAPSULE, DELAYED RELEASE ORAL
Qty: 90 CAPSULE | Refills: 3 | Status: SHIPPED | OUTPATIENT
Start: 2024-03-29

## 2024-03-29 NOTE — TELEPHONE ENCOUNTER
[id number] sent Rx request for the following:    Patient enrolled in our Rx Med Sync service to improve adherence. We are requesting a refill authorization in advance to ensure an active prescription is on file.     Requested Prescriptions   Pending Prescriptions Disp Refills    omeprazole (PRILOSEC) 40 MG DR capsule [Pharmacy Med Name: OMEPRAZOLE 40MG DR CAPSULE] 90 capsule 3     Sig: TAKE 1 CAPSULE BY MOUTH ONCE DAILY WITH FOOD       PPI Protocol Failed - 3/29/2024  8:48 AM        Failed - Medication indicated for associated diagnosis     The medication is prescribed for one or more of the following conditions:     Erosive esophagitis    Gastritis   Gastric hypersecretion   Gastric ulcer   Gastroesophageal reflux disease   Helicobacter pylori gastrointestinal tract infection   Ulcer of duodenum   Drug-induced peptic ulcer   Esophageal stricture   Gastrointestinal hemorrhage   Indigestion   Stress ulcer   Zollinger-Odonnell syndrome   Cuadra s esophagus   Laryngopharyngeal reflux     Last Prescription Date:   1/29/23  Last Fill Qty/Refills:         90, R-3    Last Office Visit:              3/8/24   Future Office visit:           None    Unable to complete prescription refill per RN Medication Refill Policy.     Routing to covering provider for refill consideration, as PCP/provider is out of clinic >48 hours or Pt is completely out of medication and provider is out of the clinic today.    Stefanie Mae RN .............. 3/29/2024  9:02 AM

## 2024-07-08 DIAGNOSIS — E11.9 TYPE 2 DIABETES MELLITUS WITHOUT COMPLICATION, WITHOUT LONG-TERM CURRENT USE OF INSULIN (H): ICD-10-CM

## 2024-07-08 DIAGNOSIS — I25.10 CORONARY ARTERY DISEASE INVOLVING NATIVE CORONARY ARTERY OF NATIVE HEART WITHOUT ANGINA PECTORIS: ICD-10-CM

## 2024-07-08 DIAGNOSIS — Z95.5 STATUS POST INSERTION OF DRUG-ELUTING STENT INTO LEFT ANTERIOR DESCENDING (LAD) ARTERY: Primary | ICD-10-CM

## 2024-07-08 DIAGNOSIS — R94.39 ABNORMAL STRESS TEST: ICD-10-CM

## 2024-07-08 DIAGNOSIS — Z98.890 STATUS POST CORONARY ANGIOGRAM: ICD-10-CM

## 2024-07-08 DIAGNOSIS — Z98.890 S/P CARDIAC CATH: ICD-10-CM

## 2024-07-08 DIAGNOSIS — I20.89 STABLE ANGINA (H): ICD-10-CM

## 2024-07-08 DIAGNOSIS — Z95.5 HISTORY OF CORONARY ARTERY STENT PLACEMENT: ICD-10-CM

## 2024-07-08 DIAGNOSIS — I25.118 CORONARY ARTERY DISEASE OF NATIVE ARTERY OF NATIVE HEART WITH STABLE ANGINA PECTORIS (H): ICD-10-CM

## 2024-07-09 RX ORDER — NITROGLYCERIN 0.4 MG/1
TABLET SUBLINGUAL
Qty: 25 TABLET | Refills: 3 | Status: SHIPPED | OUTPATIENT
Start: 2024-07-09

## 2024-07-09 NOTE — TELEPHONE ENCOUNTER
Requested Prescriptions   Pending Prescriptions Disp Refills    nitroGLYcerin (NITROSTAT) 0.4 MG sublingual tablet [Pharmacy Med Name: NITROGLYCERIN 0.4MG SL TABLET] 25 tablet 3     Sig: FOR CHEST PAIN PLACE 1 TABLET UNDER THE TONGUE EVERY 5 MINUTES FOR 3 DOSES. IF SYMPTOMS PERSIST 5 MINUTES AFTER 1ST DOSE CALL 911.       Nitrates Failed - 7/8/2024  3:50 PM        Failed - Blood pressure under 140/90 in past 12 months     BP Readings from Last 3 Encounters:   03/08/24 (!) 162/80   04/18/23 132/80   01/09/23 130/70   No data recorded        Failed - Pt is not on erectile dysfunction medications        Failed - Sublingual nitro order needs review     If refill exceeds 1 bottle per month, please forward request to provider.         Failed - Recent (12 mo) or future (90 days) visit within the authorizing provider's specialty     The patient must have completed an in-person or virtual visit within the past 12 months or has a future visit scheduled within the next 90 days with the authorizing provider s specialty.  Urgent care and e-visits do not quality as an office visit for this protocol.        Failed - Always fail criteria - needs review     Review chart. Forward refill request to provider if patient has exceeded one bottle per 3 months.           Passed - Medication is active on med list        Passed - Medication indicated for associated diagnosis     Medication is associated with one or more of the following diagnoses:    Anal fissure   Angina pectoris   Cardiac syndrome X   Congestive heart failure   Hypertension   Myocardial infarction   Pulmonary edema   Pulmonary hypertension        Passed - Patient is age 18 or older     Last Written Prescription Date:  9/22/22  Last Fill Quantity: 25,   # refills: 3  Last Office Visit: 11/22/22  Future Office visit:    Next 5 appointments (look out 90 days)      Jul 29, 2024 2:20 PM  (Arrive by 2:05 PM)  Provider Visit with Sg Szymanski MD  [facility]  ([facility] ) [address]  [place] [id number] [phone]     Routing refill request to provider for review/approval because:  Blood pressure out of range   Patient on cialis  Failed - Sublingual nitro order needs review   If refill exceeds 1 bottle per month, please forward request to provider.      Unable to complete prescription refill per RN Medication Refill Policy.   Em Haywood RN ....................  7/9/2024   9:34 AM

## 2024-07-11 RX ORDER — LIRAGLUTIDE 6 MG/ML
INJECTION SUBCUTANEOUS
Qty: 3 ML | Refills: 3 | Status: SHIPPED | OUTPATIENT
Start: 2024-07-11 | End: 2024-08-26

## 2024-07-11 NOTE — TELEPHONE ENCOUNTER
[facility] sent Rx request for the following:      Requested Prescriptions   Pending Prescriptions Disp Refills    VICTOZA PEN 18 MG/3ML soln [Pharmacy Med Name: VICTOZA 18MG/3ML PEN INJ] 3 mL 3     Sig: INJECT 0.6 MG SUBCUTANEOUS DAILY       GLP-1 Agonists Protocol Failed - 7/11/2024  2:04 PM   Last Prescription Date:   6/21/23  Last Fill Qty/Refills:         3 ml, R-3    Last Office Visit:              3/8/24   Future Office visit:             Next 5 appointments (look out 90 days)      Jul 29, 2024 2:20 PM  (Arrive by 2:05 PM)  Provider Visit with Sg Szymanski MD  [facility] ([facility] ) [address]  [place] [id number] [phone]           Routing to covering provider for refill consideration, as PCP/provider is out of clinic >48 hours or Pt is completely out of medication and provider is out of the clinic today.  Miranda Suarez RN on 7/11/2024 at 2:05 PM

## 2024-07-29 ENCOUNTER — OFFICE VISIT (OUTPATIENT)
Dept: FAMILY MEDICINE | Facility: OTHER | Age: 49
End: 2024-07-29
Attending: FAMILY MEDICINE
Payer: COMMERCIAL

## 2024-07-29 VITALS
WEIGHT: 245.4 LBS | BODY MASS INDEX: 35.13 KG/M2 | OXYGEN SATURATION: 98 % | RESPIRATION RATE: 16 BRPM | HEART RATE: 60 BPM | HEIGHT: 70 IN | TEMPERATURE: 97.3 F | SYSTOLIC BLOOD PRESSURE: 148 MMHG | DIASTOLIC BLOOD PRESSURE: 82 MMHG

## 2024-07-29 DIAGNOSIS — M25.531 PAIN OF BOTH WRIST JOINTS: ICD-10-CM

## 2024-07-29 DIAGNOSIS — Z98.890 STATUS POST CORONARY ANGIOGRAM: ICD-10-CM

## 2024-07-29 DIAGNOSIS — I25.119 CORONARY ARTERY DISEASE INVOLVING NATIVE CORONARY ARTERY OF NATIVE HEART WITH ANGINA PECTORIS (H): ICD-10-CM

## 2024-07-29 DIAGNOSIS — I20.89 STABLE ANGINA (H): ICD-10-CM

## 2024-07-29 DIAGNOSIS — E11.9 TYPE 2 DIABETES MELLITUS WITHOUT COMPLICATION, WITHOUT LONG-TERM CURRENT USE OF INSULIN (H): ICD-10-CM

## 2024-07-29 DIAGNOSIS — F41.9 ANXIETY: ICD-10-CM

## 2024-07-29 DIAGNOSIS — Z98.890 S/P CARDIAC CATH: ICD-10-CM

## 2024-07-29 DIAGNOSIS — I10 ESSENTIAL HYPERTENSION: ICD-10-CM

## 2024-07-29 DIAGNOSIS — Z72.0 TOBACCO ABUSE: Primary | ICD-10-CM

## 2024-07-29 DIAGNOSIS — I25.10 CORONARY ARTERY DISEASE INVOLVING NATIVE CORONARY ARTERY OF NATIVE HEART WITHOUT ANGINA PECTORIS: ICD-10-CM

## 2024-07-29 DIAGNOSIS — I25.118 CORONARY ARTERY DISEASE OF NATIVE ARTERY OF NATIVE HEART WITH STABLE ANGINA PECTORIS (H): ICD-10-CM

## 2024-07-29 DIAGNOSIS — Z95.5 HISTORY OF CORONARY ARTERY STENT PLACEMENT: ICD-10-CM

## 2024-07-29 DIAGNOSIS — E78.2 MIXED HYPERLIPIDEMIA: ICD-10-CM

## 2024-07-29 DIAGNOSIS — Z95.5 STATUS POST INSERTION OF DRUG-ELUTING STENT INTO LEFT ANTERIOR DESCENDING (LAD) ARTERY: ICD-10-CM

## 2024-07-29 DIAGNOSIS — M25.532 PAIN OF BOTH WRIST JOINTS: ICD-10-CM

## 2024-07-29 DIAGNOSIS — R94.39 ABNORMAL STRESS TEST: ICD-10-CM

## 2024-07-29 DIAGNOSIS — M54.50 LOW BACK PAIN WITHOUT SCIATICA, UNSPECIFIED BACK PAIN LATERALITY, UNSPECIFIED CHRONICITY: ICD-10-CM

## 2024-07-29 LAB
ALBUMIN SERPL BCG-MCNC: 4.4 G/DL (ref 3.5–5.2)
ALP SERPL-CCNC: 92 U/L (ref 40–150)
ALT SERPL W P-5'-P-CCNC: 13 U/L (ref 0–70)
ANION GAP SERPL CALCULATED.3IONS-SCNC: 9 MMOL/L (ref 7–15)
AST SERPL W P-5'-P-CCNC: 15 U/L (ref 0–45)
BILIRUB SERPL-MCNC: 0.6 MG/DL
BUN SERPL-MCNC: 14.3 MG/DL (ref 6–20)
CALCIUM SERPL-MCNC: 9.3 MG/DL (ref 8.8–10.4)
CHLORIDE SERPL-SCNC: 99 MMOL/L (ref 98–107)
CHOLEST SERPL-MCNC: 198 MG/DL
CREAT SERPL-MCNC: 1.06 MG/DL (ref 0.67–1.17)
CREAT UR-MCNC: 261.7 MG/DL
EGFRCR SERPLBLD CKD-EPI 2021: 85 ML/MIN/1.73M2
FASTING STATUS PATIENT QL REPORTED: NO
FASTING STATUS PATIENT QL REPORTED: NO
GLUCOSE SERPL-MCNC: 190 MG/DL (ref 70–99)
HBA1C MFR BLD: 8.3 % (ref 4–6.2)
HCO3 SERPL-SCNC: 28 MMOL/L (ref 22–29)
HDLC SERPL-MCNC: 48 MG/DL
LDLC SERPL CALC-MCNC: 108 MG/DL
MICROALBUMIN UR-MCNC: 133.6 MG/L
MICROALBUMIN/CREAT UR: 51.05 MG/G CR (ref 0–17)
NONHDLC SERPL-MCNC: 150 MG/DL
POTASSIUM SERPL-SCNC: 4.2 MMOL/L (ref 3.4–5.3)
PROT SERPL-MCNC: 6.9 G/DL (ref 6.4–8.3)
SODIUM SERPL-SCNC: 136 MMOL/L (ref 135–145)
TRIGL SERPL-MCNC: 209 MG/DL

## 2024-07-29 PROCEDURE — 93005 ELECTROCARDIOGRAM TRACING: CPT | Performed by: FAMILY MEDICINE

## 2024-07-29 PROCEDURE — 80061 LIPID PANEL: CPT | Mod: ZL | Performed by: FAMILY MEDICINE

## 2024-07-29 PROCEDURE — 80053 COMPREHEN METABOLIC PANEL: CPT | Mod: ZL | Performed by: FAMILY MEDICINE

## 2024-07-29 PROCEDURE — G2211 COMPLEX E/M VISIT ADD ON: HCPCS | Performed by: FAMILY MEDICINE

## 2024-07-29 PROCEDURE — G0463 HOSPITAL OUTPT CLINIC VISIT: HCPCS

## 2024-07-29 PROCEDURE — 36415 COLL VENOUS BLD VENIPUNCTURE: CPT | Mod: ZL | Performed by: FAMILY MEDICINE

## 2024-07-29 PROCEDURE — 99214 OFFICE O/P EST MOD 30 MIN: CPT | Performed by: FAMILY MEDICINE

## 2024-07-29 PROCEDURE — 93010 ELECTROCARDIOGRAM REPORT: CPT | Performed by: INTERNAL MEDICINE

## 2024-07-29 PROCEDURE — 82043 UR ALBUMIN QUANTITATIVE: CPT | Mod: ZL | Performed by: FAMILY MEDICINE

## 2024-07-29 PROCEDURE — 83036 HEMOGLOBIN GLYCOSYLATED A1C: CPT | Mod: ZL | Performed by: FAMILY MEDICINE

## 2024-07-29 RX ORDER — ASPIRIN 81 MG/1
81 TABLET, CHEWABLE ORAL DAILY
Qty: 90 TABLET | Refills: 4 | Status: SHIPPED | OUTPATIENT
Start: 2024-07-29

## 2024-07-29 RX ORDER — HYDROCHLOROTHIAZIDE 25 MG/1
25 TABLET ORAL DAILY
Qty: 90 TABLET | Refills: 4 | Status: SHIPPED | OUTPATIENT
Start: 2024-07-29

## 2024-07-29 RX ORDER — LISINOPRIL 40 MG/1
TABLET ORAL
Qty: 90 TABLET | Refills: 4 | Status: SHIPPED | OUTPATIENT
Start: 2024-07-29

## 2024-07-29 RX ORDER — NICOTINE 21 MG/24HR
1 PATCH, TRANSDERMAL 24 HOURS TRANSDERMAL EVERY 24 HOURS
Qty: 30 PATCH | Refills: 3 | Status: SHIPPED | OUTPATIENT
Start: 2024-07-29

## 2024-07-29 RX ORDER — MELOXICAM 15 MG/1
15 TABLET ORAL DAILY
Qty: 90 TABLET | Refills: 4 | Status: SHIPPED | OUTPATIENT
Start: 2024-07-29

## 2024-07-29 RX ORDER — ROSUVASTATIN CALCIUM 40 MG/1
40 TABLET, COATED ORAL DAILY
Qty: 90 TABLET | Refills: 4 | Status: SHIPPED | OUTPATIENT
Start: 2024-07-29

## 2024-07-29 RX ORDER — GABAPENTIN 300 MG/1
CAPSULE ORAL
Qty: 180 CAPSULE | Refills: 11 | Status: SHIPPED | OUTPATIENT
Start: 2024-07-29

## 2024-07-29 RX ORDER — AMLODIPINE BESYLATE 10 MG/1
10 TABLET ORAL DAILY
Qty: 90 TABLET | Refills: 4 | Status: SHIPPED | OUTPATIENT
Start: 2024-07-29

## 2024-07-29 RX ORDER — CITALOPRAM HYDROBROMIDE 20 MG/1
20 TABLET ORAL DAILY
Qty: 90 TABLET | Refills: 4 | Status: SHIPPED | OUTPATIENT
Start: 2024-07-29

## 2024-07-29 ASSESSMENT — ANXIETY QUESTIONNAIRES
7. FEELING AFRAID AS IF SOMETHING AWFUL MIGHT HAPPEN: NOT AT ALL
6. BECOMING EASILY ANNOYED OR IRRITABLE: MORE THAN HALF THE DAYS
4. TROUBLE RELAXING: NEARLY EVERY DAY
GAD7 TOTAL SCORE: 13
GAD7 TOTAL SCORE: 13
IF YOU CHECKED OFF ANY PROBLEMS ON THIS QUESTIONNAIRE, HOW DIFFICULT HAVE THESE PROBLEMS MADE IT FOR YOU TO DO YOUR WORK, TAKE CARE OF THINGS AT HOME, OR GET ALONG WITH OTHER PEOPLE: VERY DIFFICULT
2. NOT BEING ABLE TO STOP OR CONTROL WORRYING: MORE THAN HALF THE DAYS
3. WORRYING TOO MUCH ABOUT DIFFERENT THINGS: MORE THAN HALF THE DAYS
5. BEING SO RESTLESS THAT IT IS HARD TO SIT STILL: MORE THAN HALF THE DAYS
GAD7 TOTAL SCORE: 13
8. IF YOU CHECKED OFF ANY PROBLEMS, HOW DIFFICULT HAVE THESE MADE IT FOR YOU TO DO YOUR WORK, TAKE CARE OF THINGS AT HOME, OR GET ALONG WITH OTHER PEOPLE?: VERY DIFFICULT
7. FEELING AFRAID AS IF SOMETHING AWFUL MIGHT HAPPEN: NOT AT ALL
1. FEELING NERVOUS, ANXIOUS, OR ON EDGE: MORE THAN HALF THE DAYS

## 2024-07-29 ASSESSMENT — ASTHMA QUESTIONNAIRES
ACT_TOTALSCORE: 15
QUESTION_2 LAST FOUR WEEKS HOW OFTEN HAVE YOU HAD SHORTNESS OF BREATH: THREE TO SIX TIMES A WEEK
QUESTION_3 LAST FOUR WEEKS HOW OFTEN DID YOUR ASTHMA SYMPTOMS (WHEEZING, COUGHING, SHORTNESS OF BREATH, CHEST TIGHTNESS OR PAIN) WAKE YOU UP AT NIGHT OR EARLIER THAN USUAL IN THE MORNING: TWO OR THREE NIGHTS A WEEK
QUESTION_1 LAST FOUR WEEKS HOW MUCH OF THE TIME DID YOUR ASTHMA KEEP YOU FROM GETTING AS MUCH DONE AT WORK, SCHOOL OR AT HOME: SOME OF THE TIME
QUESTION_4 LAST FOUR WEEKS HOW OFTEN HAVE YOU USED YOUR RESCUE INHALER OR NEBULIZER MEDICATION (SUCH AS ALBUTEROL): NOT AT ALL
ACT_TOTALSCORE: 15
QUESTION_5 LAST FOUR WEEKS HOW WOULD YOU RATE YOUR ASTHMA CONTROL: POORLY CONTROLLED

## 2024-07-29 ASSESSMENT — PATIENT HEALTH QUESTIONNAIRE - PHQ9
10. IF YOU CHECKED OFF ANY PROBLEMS, HOW DIFFICULT HAVE THESE PROBLEMS MADE IT FOR YOU TO DO YOUR WORK, TAKE CARE OF THINGS AT HOME, OR GET ALONG WITH OTHER PEOPLE: VERY DIFFICULT
SUM OF ALL RESPONSES TO PHQ QUESTIONS 1-9: 18
SUM OF ALL RESPONSES TO PHQ QUESTIONS 1-9: 18

## 2024-07-29 ASSESSMENT — PAIN SCALES - GENERAL: PAINLEVEL: MODERATE PAIN (5)

## 2024-07-29 NOTE — LETTER
July 30, 2024      Ben Figueroa  [address] [address] [address]        Dear ,    We are writing to inform you of your test results.    As we discussed in clinic, your diabetes testing is higher then recommended.  I am hopeful that with restarting all of your medications recently this will be under better control soon.    The rest of your labs look ok.    We will see you in a month.    Resulted Orders   Lipid Panel   Result Value Ref Range    Cholesterol 198 <200 mg/dL    Triglycerides 209 (H) <150 mg/dL    Direct Measure HDL 48 >=40 mg/dL    LDL Cholesterol Calculated 108 (H) <=100 mg/dL    Non HDL Cholesterol 150 (H) <130 mg/dL    Patient Fasting > 8hrs? No       Comment:      coffee withsugar  coffee withsugar  coffee withsugar    Narrative    Cholesterol  Desirable:  <200 mg/dL    Triglycerides  Normal:  Less than 150 mg/dL  Borderline High:  150-199 mg/dL  High:  200-499 mg/dL  Very High:  Greater than or equal to 500 mg/dL    Direct Measure HDL  Female:  Greater than or equal to 50 mg/dL   Male:  Greater than or equal to 40 mg/dL    LDL Cholesterol  Desirable:  <100mg/dL  Above Desirable:  100-129 mg/dL   Borderline High:  130-159 mg/dL   High:  160-189 mg/dL   Very High:  >= 190 mg/dL    Non HDL Cholesterol  Desirable:  130 mg/dL  Above Desirable:  130-159 mg/dL  Borderline High:  160-189 mg/dL  High:  190-219 mg/dL  Very High:  Greater than or equal to 220 mg/dL   Comprehensive Metabolic Panel   Result Value Ref Range    Sodium 136 135 - 145 mmol/L    Potassium 4.2 3.4 - 5.3 mmol/L    Carbon Dioxide (CO2) 28 22 - 29 mmol/L    Anion Gap 9 7 - 15 mmol/L    Urea Nitrogen 14.3 6.0 - 20.0 mg/dL    Creatinine 1.06 0.67 - 1.17 mg/dL    GFR Estimate 85 >60 mL/min/1.73m2      Comment:      eGFR calculated using 2021 CKD-EPI equation.    Calcium 9.3 8.8 - 10.4 mg/dL      Comment:      Reference intervals for this test were updated on 7/16/2024 to reflect our healthy population more accurately.  There may be differences in the flagging of prior results with similar values performed with this method. Those prior results can be interpreted in the context of the updated reference intervals.    Chloride 99 98 - 107 mmol/L    Glucose 190 (H) 70 - 99 mg/dL    Alkaline Phosphatase 92 40 - 150 U/L    AST 15 0 - 45 U/L    ALT 13 0 - 70 U/L    Protein Total 6.9 6.4 - 8.3 g/dL    Albumin 4.4 3.5 - 5.2 g/dL    Bilirubin Total 0.6 <=1.2 mg/dL    Patient Fasting > 8hrs? No       Comment:      coffee withsugar   Hemoglobin A1c   Result Value Ref Range    Hemoglobin A1C 8.3 (H) 4.0 - 6.2 %   Albumin Random Urine Quantitative with Creat Ratio   Result Value Ref Range    Creatinine Urine mg/dL 261.7 mg/dL      Comment:      The reference ranges have not been established in urine creatinine. The results should be integrated into the clinical context for interpretation.    Albumin Urine mg/L 133.6 mg/L      Comment:      The reference ranges have not been established in urine albumin. The results should be integrated into the clinical context for interpretation.    Albumin Urine mg/g Cr 51.05 (H) 0.00 - 17.00 mg/g Cr      Comment:      Microalbuminuria is defined as an albumin:creatinine ratio of 17 to 299 for males and 25 to 299 for females. A ratio of albumin:creatinine of 300 or higher is indicative of overt proteinuria.  Due to biologic variability, positive results should be confirmed by a second, first-morning random or 24-hour timed urine specimen. If there is discrepancy, a third specimen is recommended. When 2 out of 3 results are in the microalbuminuria range, this is evidence for incipient nephropathy and warrants increased efforts at glucose control, blood pressure control, and institution of therapy with an angiotensin-converting-enzyme (ACE) inhibitor (if the patient can tolerate it).         If you have any questions or concerns, please call the clinic at the number listed above.       Sincerely,      Sg  MD Nessa

## 2024-07-29 NOTE — PROGRESS NOTES
Assessment & Plan     Essential hypertension  He reports that he did not take his blood pressure medication this morning, he will monitor his blood pressure at home as I suspect it will be under control as he takes his blood pressure medication regularly.  - amLODIPine (NORVASC) 10 MG tablet; Take 1 tablet (10 mg) by mouth daily  - hydrochlorothiazide (HYDRODIURIL) 25 MG tablet; Take 1 tablet (25 mg) by mouth daily  - lisinopril (ZESTRIL) 40 MG tablet; TAKE 1 TAB BY MOUTH ONCE DAILY    Coronary artery disease involving native coronary artery of native heart with angina pectoris (H24)  EKG was repeated, personally viewed shows normal sinus rhythm without any ischemic changes.  Recommend follow-up with cardiology given his history of heart disease in the past.  - amLODIPine (NORVASC) 10 MG tablet; Take 1 tablet (10 mg) by mouth daily  - aspirin (ASA) 81 MG chewable tablet; Take 1 tablet (81 mg) by mouth daily  - EKG 12-lead, tracing only (Same Day)  - Lipid Panel; Future  - Comprehensive Metabolic Panel; Future    Status post insertion of drug-eluting stent into left anterior descending (LAD) artery x2 (9/8/2020)  Chronic, see above  - amLODIPine (NORVASC) 10 MG tablet; Take 1 tablet (10 mg) by mouth daily  - aspirin (ASA) 81 MG chewable tablet; Take 1 tablet (81 mg) by mouth daily    Coronary artery disease of native artery of native heart with stable angina pectoris (H24)  - aspirin (ASA) 81 MG chewable tablet; Take 1 tablet (81 mg) by mouth daily  - rosuvastatin (CRESTOR) 40 MG tablet; Take 1 tablet (40 mg) by mouth daily    Abnormal stress test on 8/24/20  - aspirin (ASA) 81 MG chewable tablet; Take 1 tablet (81 mg) by mouth daily    Stable angina (H24)  Follow-up with cardiology, consider Imdur  - aspirin (ASA) 81 MG chewable tablet; Take 1 tablet (81 mg) by mouth daily  - rosuvastatin (CRESTOR) 40 MG tablet; Take 1 tablet (40 mg) by mouth daily    Coronary artery disease involving native coronary artery of  native heart without angina pectoris  - aspirin (ASA) 81 MG chewable tablet; Take 1 tablet (81 mg) by mouth daily  - rosuvastatin (CRESTOR) 40 MG tablet; Take 1 tablet (40 mg) by mouth daily    History of coronary artery stent placement x2 to the LAD on 9/8/2020 at [facility]  - aspirin (ASA) 81 MG chewable tablet; Take 1 tablet (81 mg) by mouth daily  - rosuvastatin (CRESTOR) 40 MG tablet; Take 1 tablet (40 mg) by mouth daily    S/P cardiac cath at [facility] on 9/8/2020  - aspirin (ASA) 81 MG chewable tablet; Take 1 tablet (81 mg) by mouth daily  - rosuvastatin (CRESTOR) 40 MG tablet; Take 1 tablet (40 mg) by mouth daily    Status post coronary angiogram  - aspirin (ASA) 81 MG chewable tablet; Take 1 tablet (81 mg) by mouth daily  - rosuvastatin (CRESTOR) 40 MG tablet; Take 1 tablet (40 mg) by mouth daily    Anxiety  Stable, continue current regimen  - citalopram (CELEXA) 20 MG tablet; Take 1 tablet (20 mg) by mouth daily    Low back pain without sciatica, unspecified back pain laterality, unspecified chronicity  Continue gabapentin.  He does have worsening neck pain however discussed that we need to get his chronic medical problem such as hypertension, diabetes and chronic chest pain under better control.  - gabapentin (NEURONTIN) 300 MG capsule; TAKE 2 CAPSULES BY MOUTH THREE TIMES DAILY    Pain of both wrist joints  Continue anti-inflammatories  - meloxicam (MOBIC) 15 MG tablet; Take 1 tablet (15 mg) by mouth daily    Mixed hyperlipidemia  Continue statin  - rosuvastatin (CRESTOR) 40 MG tablet; Take 1 tablet (40 mg) by mouth daily    Type 2 diabetes mellitus without complication, without long-term current use of insulin (H)  Update hemoglobin A1c today.  He reports he has been using Victoza on a weekly basis.  - rosuvastatin (CRESTOR) 40 MG tablet; Take 1 tablet (40 mg) by mouth daily  - Hemoglobin A1c; Future  - Albumin Random Urine Quantitative with Creat Ratio; Future    Tobacco abuse  He recently started  "smoking again.  Will send in nicotine patch for him to use.  He can contact me for next lower dose when he is ready.  - nicotine (NICODERM CQ) 21 MG/24HR 24 hr patch; Place 1 patch onto the skin every 24 hours          Nicotine/Tobacco Cessation  He reports that he has been smoking cigarettes and cigars. He has never used smokeless tobacco.  Nicotine/Tobacco Cessation Plan  Pharmacotherapies : Nicotine patch      BMI  Estimated body mass index is 35.21 kg/m  as calculated from the following:    Height as of this encounter: 1.778 m (5' 10\").    Weight as of this encounter: 111.3 kg (245 lb 6.4 oz).   Weight management plan: Discussed healthy diet and exercise guidelines    Depression Screening Follow Up      Follow Up Actions Taken  Crisis resource information provided in the After Visit Summary  Patient declined referral.    No follow-ups on file.    Cesia Contreras is a 48 year old, presenting for the following health issues:  Diabetes      7/29/2024     2:25 PM   Additional Questions   Roomed by KEMAL Velasquez   Accompanied by Self     He has a history of type 2 diabetes, hypertension, heart disease, depression, chronic neck pain.  All these been poorly controlled.  He has not been seen for a year and a half.  He reports that he recently restarted his medications approxi-1 month ago however when reviewing his dispensing information it does not appear that he has gotten any medications in nearly a year.    Nonetheless he reports that he checks his blood pressure occasionally and is generally in the 130s systolically.  He continues to have chronic neck pain.    He describes generalized chest pain with exertion.  This is a chronic concern for him.  Last coronary angiogram was performed and October 2021 which showed a patent LAD stent and 80% stenosis at the ostial ramus branch which was not able to be stented at that time.    History of Present Illness       Mental Health Follow-up:  Patient presents to follow-up on " "Depression & Anxiety.Patient's depression since last visit has been:  Medium  The patient is not having other symptoms associated with depression.  Patient's anxiety since last visit has been:  Medium  The patient is not having other symptoms associated with anxiety.  Any significant life events: No  Patient is not feeling anxious or having panic attacks.  Patient has no concerns about alcohol or drug use.    Diabetes:   He presents for follow up of diabetes.  He is checking home blood glucose a few times a month.   He checks blood glucose at bedtime.  Blood glucose is sometimes over 200 and never under 70. He is aware of hypoglycemia symptoms including dizziness and blurred vision.    He has no concerns regarding his diabetes at this time.  He is having numbness in feet, burning in feet and blurry vision.            Hyperlipidemia:  He presents for follow up of hyperlipidemia.   He is taking medication to lower cholesterol. He is not having myalgia or other side effects to statin medications.    Hypertension: He presents for follow up of hypertension.  He does not check blood pressure  regularly outside of the clinic. Outside blood pressures have been over 140/90. He does not follow a low salt diet.     Headaches:   Since the patient's last clinic visit, headaches are: no change  The patient is getting headaches:  Come and go with neck pain  He is not able to do normal daily activities when he has a migraine.  The patient is taking the following rescue/relief medications:  Ibuprofen (Advil, Motrin)   Patient states \"I get only a small amount of relief\" from the rescue/relief medications.   The patient is taking the following medications to prevent migraines:  No medications to prevent migraines  In the past 4 weeks, the patient has gone to an Urgent Care or Emergency Room 0 times times due to headaches.    Vascular Disease:  He presents for follow up of vascular disease.      He takes daily aspirin.    Reason for " "visit:  Followup    He eats 0-1 servings of fruits and vegetables daily.He consumes 1 sweetened beverage(s) daily.He exercises with enough effort to increase his heart rate 60 or more minutes per day.  He exercises with enough effort to increase his heart rate 4 days per week. He is missing 1 dose(s) of medications per week.  He is not taking prescribed medications regularly due to side effects and remembering to take.                     Objective    BP (!) 148/82   Pulse 60   Temp 97.3  F (36.3  C) (Tympanic)   Resp 16   Ht 1.778 m (5' 10\")   Wt 111.3 kg (245 lb 6.4 oz)   SpO2 98%   BMI 35.21 kg/m    Body mass index is 35.21 kg/m .  Physical Exam   GENERAL: alert and no distress  EYES: Eyes grossly normal to inspection, PERRL and conjunctivae and sclerae normal  RESP: lungs clear to auscultation - no rales, rhonchi or wheezes  CV: regular rate and rhythm, normal S1 S2, no S3 or S4, no murmur, click or rub, no peripheral edema   MS: no gross musculoskeletal defects noted, no edema  SKIN: no suspicious lesions or rashes  NEURO: Normal strength and tone, mentation intact and speech normal  Diabetic foot exam: normal DP and PT pulses, no trophic changes or ulcerative lesions, and normal sensory exam            Signed Electronically by: Sg Szymanski MD    "

## 2024-07-29 NOTE — NURSING NOTE
"Chief Complaint   Patient presents with    Diabetes       Initial BP (!) 148/90   Pulse 60   Temp 97.3  F (36.3  C) (Tympanic)   Resp 16   Ht 1.778 m (5' 10\")   Wt 111.3 kg (245 lb 6.4 oz)   SpO2 98%   BMI 35.21 kg/m   Estimated body mass index is 35.21 kg/m  as calculated from the following:    Height as of this encounter: 1.778 m (5' 10\").    Weight as of this encounter: 111.3 kg (245 lb 6.4 oz).  Medication Review: complete    The next two questions are to help us understand your food security.  If you are feeling you need any assistance in this area, we have resources available to support you today.          3/8/2024   SDOH- Food Insecurity   Within the past 12 months, did you worry that your food would run out before you got money to buy more? Pt Declined   Within the past 12 months, did the food you bought just not last and you didn t have money to get more? Pt Declined            Health Care Directive:  Patient does not have a Health Care Directive or Living Will: Discussed advance care planning with patient; however, patient declined at this time.    Eva Gil, CMA      "

## 2024-07-30 LAB
ATRIAL RATE - MUSE: 61 BPM
DIASTOLIC BLOOD PRESSURE - MUSE: NORMAL MMHG
INTERPRETATION ECG - MUSE: NORMAL
P AXIS - MUSE: 61 DEGREES
PR INTERVAL - MUSE: 224 MS
QRS DURATION - MUSE: 108 MS
QT - MUSE: 416 MS
QTC - MUSE: 418 MS
R AXIS - MUSE: 2 DEGREES
SYSTOLIC BLOOD PRESSURE - MUSE: NORMAL MMHG
T AXIS - MUSE: 15 DEGREES
VENTRICULAR RATE- MUSE: 61 BPM

## 2024-08-20 ENCOUNTER — TELEPHONE (OUTPATIENT)
Dept: FAMILY MEDICINE | Facility: OTHER | Age: 49
End: 2024-08-20
Payer: COMMERCIAL

## 2024-08-20 NOTE — TELEPHONE ENCOUNTER
Left message to call back. Patient scheduled 8/26/24 with Dr. Szymanski for blood pressure and diabetic check. He is due for his physical. Would he like to do that on 8/26/24?? If so, please change appointment type to physical and make it a 40 minute appointment. (Will have to use same day to make it a 40 minute)    Eva Gil CMA on 8/20/2024 at 10:31 AM  Ext [phone]

## 2024-08-21 NOTE — TELEPHONE ENCOUNTER
Appt is scheduled for px and 40 minutes.  Alyson Ruiz LPN ....................  8/21/2024   7:39 AM  EXT [phone]

## 2024-08-25 SDOH — HEALTH STABILITY: PHYSICAL HEALTH: ON AVERAGE, HOW MANY MINUTES DO YOU ENGAGE IN EXERCISE AT THIS LEVEL?: 10 MIN

## 2024-08-25 SDOH — HEALTH STABILITY: PHYSICAL HEALTH: ON AVERAGE, HOW MANY DAYS PER WEEK DO YOU ENGAGE IN MODERATE TO STRENUOUS EXERCISE (LIKE A BRISK WALK)?: 7 DAYS

## 2024-08-25 ASSESSMENT — ASTHMA QUESTIONNAIRES
QUESTION_5 LAST FOUR WEEKS HOW WOULD YOU RATE YOUR ASTHMA CONTROL: NOT CONTROLLED AT ALL
QUESTION_2 LAST FOUR WEEKS HOW OFTEN HAVE YOU HAD SHORTNESS OF BREATH: ONCE OR TWICE A WEEK
ACT_TOTALSCORE: 15
ACT_TOTALSCORE: 15
QUESTION_1 LAST FOUR WEEKS HOW MUCH OF THE TIME DID YOUR ASTHMA KEEP YOU FROM GETTING AS MUCH DONE AT WORK, SCHOOL OR AT HOME: SOME OF THE TIME
QUESTION_4 LAST FOUR WEEKS HOW OFTEN HAVE YOU USED YOUR RESCUE INHALER OR NEBULIZER MEDICATION (SUCH AS ALBUTEROL): NOT AT ALL
QUESTION_3 LAST FOUR WEEKS HOW OFTEN DID YOUR ASTHMA SYMPTOMS (WHEEZING, COUGHING, SHORTNESS OF BREATH, CHEST TIGHTNESS OR PAIN) WAKE YOU UP AT NIGHT OR EARLIER THAN USUAL IN THE MORNING: TWO OR THREE NIGHTS A WEEK

## 2024-08-25 ASSESSMENT — SOCIAL DETERMINANTS OF HEALTH (SDOH): HOW OFTEN DO YOU GET TOGETHER WITH FRIENDS OR RELATIVES?: ONCE A WEEK

## 2024-08-26 ENCOUNTER — OFFICE VISIT (OUTPATIENT)
Dept: FAMILY MEDICINE | Facility: OTHER | Age: 49
End: 2024-08-26
Attending: FAMILY MEDICINE
Payer: COMMERCIAL

## 2024-08-26 VITALS
OXYGEN SATURATION: 99 % | BODY MASS INDEX: 34.52 KG/M2 | WEIGHT: 246.6 LBS | DIASTOLIC BLOOD PRESSURE: 80 MMHG | SYSTOLIC BLOOD PRESSURE: 138 MMHG | RESPIRATION RATE: 19 BRPM | HEART RATE: 77 BPM | TEMPERATURE: 98 F | HEIGHT: 71 IN

## 2024-08-26 DIAGNOSIS — Z12.11 SPECIAL SCREENING FOR MALIGNANT NEOPLASMS, COLON: ICD-10-CM

## 2024-08-26 DIAGNOSIS — E78.2 MIXED HYPERLIPIDEMIA: ICD-10-CM

## 2024-08-26 DIAGNOSIS — I10 ESSENTIAL HYPERTENSION: ICD-10-CM

## 2024-08-26 DIAGNOSIS — E11.9 TYPE 2 DIABETES MELLITUS WITHOUT COMPLICATION, WITHOUT LONG-TERM CURRENT USE OF INSULIN (H): ICD-10-CM

## 2024-08-26 DIAGNOSIS — G47.33 OSA ON CPAP: ICD-10-CM

## 2024-08-26 DIAGNOSIS — I25.10 CORONARY ARTERY DISEASE INVOLVING NATIVE CORONARY ARTERY OF NATIVE HEART WITHOUT ANGINA PECTORIS: ICD-10-CM

## 2024-08-26 DIAGNOSIS — E66.01 MORBID OBESITY (H): ICD-10-CM

## 2024-08-26 DIAGNOSIS — K21.9 GASTROESOPHAGEAL REFLUX DISEASE WITHOUT ESOPHAGITIS: ICD-10-CM

## 2024-08-26 DIAGNOSIS — Z00.00 PHYSICAL EXAM: Primary | ICD-10-CM

## 2024-08-26 PROCEDURE — 99213 OFFICE O/P EST LOW 20 MIN: CPT | Mod: 25 | Performed by: FAMILY MEDICINE

## 2024-08-26 PROCEDURE — 99396 PREV VISIT EST AGE 40-64: CPT | Performed by: FAMILY MEDICINE

## 2024-08-26 PROCEDURE — G0463 HOSPITAL OUTPT CLINIC VISIT: HCPCS

## 2024-08-26 RX ORDER — LIRAGLUTIDE 6 MG/ML
1.2 INJECTION SUBCUTANEOUS DAILY
Qty: 3 ML | Refills: 3 | Status: SHIPPED | OUTPATIENT
Start: 2024-08-26

## 2024-08-26 RX ORDER — ACYCLOVIR 400 MG/1
1 TABLET ORAL
Qty: 9 EACH | Refills: 5 | Status: SHIPPED | OUTPATIENT
Start: 2024-08-26

## 2024-08-26 RX ORDER — ACYCLOVIR 400 MG/1
1 TABLET ORAL ONCE
Qty: 1 EACH | Refills: 0 | Status: SHIPPED | OUTPATIENT
Start: 2024-08-26 | End: 2024-08-26

## 2024-08-26 ASSESSMENT — PAIN SCALES - GENERAL: PAINLEVEL: SEVERE PAIN (6)

## 2024-08-26 NOTE — PROGRESS NOTES
Preventive Care Visit  [facility] AND [facility]  Sg Szymanski MD, Family Medicine  Aug 26, 2024      Assessment & Plan     Physical exam  Discussed routine immunization recommendations.  Encouraged daily exercise.    SCOTT on CPAP  Continue CPAP    Morbid obesity (H)  Encouraged weight loss through diet and exercise.    Gastroesophageal reflux disease without esophagitis  Continue PPI    Type 2 diabetes mellitus without complication, without long-term current use of insulin (H)  Will increase Victoza to 1.2 mg daily.  Will also send a prescription in for continuous glucose monitor to see if his insurance would cover it.  I agree that this would likely improve his glucose control.  - liraglutide (VICTOZA PEN) 18 MG/3ML solution; Inject 1.2 mg subcutaneously daily.  - Continuous Glucose  (DEXCOM G7 ) KIMI; 1 each once for 1 dose. Use to read blood sugars as per manufacturers instructions  - Continuous Glucose Sensor (DEXCOM G7 SENSOR) MISC; 1 each every 10 days. Change sensor every 10 days    Mixed hyperlipidemia  Continue statin.  Reviewed recent lipids.    Essential hypertension  Controlled, continue current regimen.    Coronary artery disease involving native coronary artery of native heart without angina pectoris  Reviewed his cardiac history, his last stent was several years ago, he no longer needs to be on Plavix.    Special screening for malignant neoplasms, colon  Discussed colon cancer screening recommendations.  He is agreeable to colonoscopy.  Referral was sent.  He is optimized for his procedure.  - Colonoscopy Screening  Referral; Future    Patient has been advised of split billing requirements and indicates understanding: Yes        Counseling  Appropriate preventive services were addressed with this patient via screening, questionnaire, or discussion as appropriate for fall prevention, nutrition, physical activity, Tobacco-use cessation, social engagement, weight loss  and cognition.  Checklist reviewing preventive services available has been given to the patient.  Reviewed patient's diet, addressing concerns and/or questions.   The patient was instructed to see the dentist every 6 months.     No follow-ups on file.    Cesia Contreras is a 49 year old, presenting for the following:  Hypertension, Diabetes, and Physical        8/26/2024     1:23 PM   Additional Questions   Roomed by Marissa MONTIEL LPN   Accompanied by self        Health Care Directive  Patient does not have a Health Care Directive or Living Will: Discussed advance care planning with patient; however, patient declined at this time.    History of Present Illness       Hypertension: He presents for follow up of hypertension.  He does check blood pressure  regularly outside of the clinic. Outside blood pressures have been over 140/90. He does not follow a low salt diet.      He has a history of type 2 diabetes.  He has poor control recently with blood sugars in the low to mid 200s.  No hypoglycemia.  He continues on Victoza and restarted that in the last month based on insurance coverage.    He has restarted taking his blood pressure medication on a regular basis.    He is interested in considering a continuous glucose monitor as he thinks that will help him control blood sugars more effectively.        8/25/2024   General Health   How would you rate your overall physical health? (!) POOR   Feel stress (tense, anxious, or unable to sleep) Very much      (!) STRESS CONCERN      8/25/2024   Nutrition   Three or more servings of calcium each day? (!) NO   Diet: Regular (no restrictions)   How many servings of fruit and vegetables per day? (!) 0-1   How many sweetened beverages each day? 0-1            8/25/2024   Exercise   Days per week of moderate/strenous exercise 7 days   Average minutes spent exercising at this level 10 min            8/25/2024   Social Factors   Frequency of gathering with friends or relatives Once a  week   Worry food won't last until get money to buy more No   Food not last or not have enough money for food? No   Do you have housing? (Housing is defined as stable permanent housing and does not include staying ouside in a car, in a tent, in an abandoned building, in an overnight shelter, or couch-surfing.) No   Are you worried about losing your housing? No   Lack of transportation? No   Unable to get utilities (heat,electricity)? No   Want help with housing or utility concern? (!) YES      (!) HOUSING CONCERN PRESENT      2024   Fall Risk   Reason Gait Speed Test Not Completed Patient declines             2024   Dental   Dentist two times every year? (!) NO            2024   TB Screening   Were you born outside of the [place]? No                    2024   Substance Use   If I could quit smoking, I would Completely agree   I want to quit somking, worry about health affects Completely agree   Willing to make a plan to quit smoking Completely agree   Willing to cut down before quitting Completely agree   Alcohol more than 3/day or more than 7/wk No   Do you use any other substances recreationally? No        Social History     Tobacco Use    Smoking status: Former     Current packs/day: 0.00     Types: Cigarettes, Cigars     Quit date: 1993     Years since quittin.0    Smokeless tobacco: Never    Tobacco comments:     Started smoking cigars again (noted 22)    Vaping Use    Vaping status: Never Used   Substance Use Topics    Alcohol use: Yes     Comment: Occasional    Drug use: Never           2024   STI Screening   New sexual partner(s) since last STI/HIV test? No      ASCVD Risk   The 10-year ASCVD risk score (Renée ROSARIO, et al., 2019) is: 9.9%    Values used to calculate the score:      Age: 49 years      Sex: Male      Is Non- : No      Diabetic: Yes      Tobacco smoker: No      Systolic Blood Pressure: 138 mmHg      Is BP treated: Yes      HDL  "Cholesterol: 48 mg/dL      Total Cholesterol: 198 mg/dL         Reviewed and updated as needed this visit by Provider                       Objective    Exam  /80 (BP Location: Right arm, Patient Position: Sitting, Cuff Size: Adult Regular)   Pulse 77   Temp 98  F (36.7  C) (Tympanic)   Resp 19   Ht 1.803 m (5' 11\")   Wt 111.9 kg (246 lb 9.6 oz)   SpO2 99%   BMI 34.39 kg/m     Estimated body mass index is 34.39 kg/m  as calculated from the following:    Height as of this encounter: 1.803 m (5' 11\").    Weight as of this encounter: 111.9 kg (246 lb 9.6 oz).    Physical Exam  GENERAL: alert and no distress  EYES: Eyes grossly normal to inspection, PERRL and conjunctivae and sclerae normal  HENT: ear canals and TM's normal, nose and mouth without ulcers or lesions  NECK: no adenopathy, no asymmetry, masses, or scars  RESP: lungs clear to auscultation - no rales, rhonchi or wheezes  CV: regular rate and rhythm, normal S1 S2, no S3 or S4, no murmur, click or rub, no peripheral edema  MS: no gross musculoskeletal defects noted, no edema  SKIN: no suspicious lesions or rashes  NEURO: Normal strength and tone, mentation intact and speech normal  PSYCH: mentation appears normal, affect normal/bright  Diabetic foot exam: normal DP and PT pulses, no trophic changes or ulcerative lesions, and normal sensory exam        Signed Electronically by: Sg Szymanski MD    "

## 2024-08-26 NOTE — NURSING NOTE
"Chief Complaint   Patient presents with    Hypertension    Diabetes    Physical     Patient here for multiple concerns including BP check, diabetic check and annual physical.     Initial /80 (BP Location: Right arm, Patient Position: Sitting, Cuff Size: Adult Regular)   Pulse 77   Temp 98  F (36.7  C) (Tympanic)   Resp 19   Ht 1.803 m (5' 11\")   Wt 111.9 kg (246 lb 9.6 oz)   SpO2 99%   BMI 34.39 kg/m   Estimated body mass index is 34.39 kg/m  as calculated from the following:    Height as of this encounter: 1.803 m (5' 11\").    Weight as of this encounter: 111.9 kg (246 lb 9.6 oz).  Medication Review: complete    The next two questions are to help us understand your food security.  If you are feeling you need any assistance in this area, we have resources available to support you today.          8/26/2024   SDOH- Food Insecurity   Within the past 12 months, did you worry that your food would run out before you got money to buy more? N   Within the past 12 months, did the food you bought just not last and you didn t have money to get more? N            Health Care Directive:  Patient does not have a Health Care Directive or Living Will: Discussed advance care planning with patient; however, patient declined at this time.    Marissa Adkins LPN      "

## 2024-08-28 ENCOUNTER — TELEPHONE (OUTPATIENT)
Dept: FAMILY MEDICINE | Facility: OTHER | Age: 49
End: 2024-08-28
Payer: COMMERCIAL

## 2024-08-28 DIAGNOSIS — Z12.11 ENCOUNTER FOR SCREENING COLONOSCOPY: Primary | ICD-10-CM

## 2024-08-28 NOTE — TELEPHONE ENCOUNTER
Screening Questions for the Scheduling of Screening Colonoscopies   (If Colonoscopy is diagnostic, Provider should review the chart before scheduling.)  Are you younger than 45 or older than 80?  No  Do you take aspirin or fish oil?  Aspirin - Hold one week prior (if yes, tell patient to stop 1 week prior to Colonoscopy)  Do you take warfarin (Coumadin), clopidogrel (Plavix), apixaban (Eliquis), dabigatram (Pradaxa), rivaroxaban (Xarelto) or any blood thinner? Yes  Do you take semaglutide (Ozempic or Wegovy), tirzepatide (Mounjaro or Zepbound), liraglutide (Victoza), or dulaglutide (Trulicity)?  Victoza  Do you use oxygen or a CPAP at home?  CPAP  Do you have kidney disease? No  Are you on dialysis? No  Have you had a stroke or heart attack in the last year? No  Have you had a stent in your heart or any blood vessel in the last year? No  Have you had a transplant of any organ? No  Have you had a colonoscopy or upper endoscopy (EGD) before? No         When?  --  Date of scheduled Colonoscopy:  10.25.2024  Provider:  Car[place]  Pharmacy:  [facility] by Evelyn Alexander on 8/28/2024 at 10:27 AM

## 2024-08-28 NOTE — TELEPHONE ENCOUNTER
Patient was called to schedule a colonoscopy.  He is scheduled with Dr. Hodges for 10.25.2024.  He stated he is on a blood thinner but couldn't say which one.  How long should it be held prior to procedure.  Kinsey Alexander on 8/28/2024 at 10:31 AM

## 2024-08-28 NOTE — TELEPHONE ENCOUNTER
Patient contacted and informed we need to know what blood thinner he stated he was taking as there are no blood thinners on his medication list besides Aspirin. Patient states he used to take Plavix. He is no longer taking that but he thought there was another blood thinner besides Aspirin he was taking. Writer informed patient she does not see any blood thinners besides Aspirin on his medication list. Patient stated he must be mistaken and is no longer on a blood thinner.     Eva Gil CMA on 8/28/2024 at 11:52 AM

## 2024-09-09 RX ORDER — POLYETHYLENE GLYCOL 3350, SODIUM CHLORIDE, SODIUM BICARBONATE, POTASSIUM CHLORIDE 420; 11.2; 5.72; 1.48 G/4L; G/4L; G/4L; G/4L
4000 POWDER, FOR SOLUTION ORAL ONCE
Qty: 4000 ML | Refills: 0 | Status: SHIPPED | OUTPATIENT
Start: 2024-10-18 | End: 2024-10-18

## 2024-09-09 RX ORDER — BISACODYL 5 MG/1
TABLET, DELAYED RELEASE ORAL
Qty: 2 TABLET | Refills: 0 | Status: SHIPPED | OUTPATIENT
Start: 2024-10-18

## 2024-10-25 ENCOUNTER — ANESTHESIA EVENT (OUTPATIENT)
Dept: SURGERY | Facility: OTHER | Age: 49
End: 2024-10-25
Payer: COMMERCIAL

## 2024-10-25 ENCOUNTER — HOSPITAL ENCOUNTER (OUTPATIENT)
Facility: OTHER | Age: 49
Discharge: HOME | End: 2024-10-25
Attending: SURGERY | Admitting: SURGERY
Payer: COMMERCIAL

## 2024-10-25 ENCOUNTER — ANESTHESIA (OUTPATIENT)
Dept: SURGERY | Facility: OTHER | Age: 49
End: 2024-10-25
Payer: COMMERCIAL

## 2024-10-25 VITALS
DIASTOLIC BLOOD PRESSURE: 102 MMHG | HEART RATE: 65 BPM | SYSTOLIC BLOOD PRESSURE: 189 MMHG | RESPIRATION RATE: 16 BRPM | OXYGEN SATURATION: 99 %

## 2024-10-25 LAB — GLUCOSE BLDC GLUCOMTR-MCNC: 236 MG/DL (ref 70–99)

## 2024-10-25 PROCEDURE — 250N000011 HC RX IP 250 OP 636: Performed by: NURSE ANESTHETIST, CERTIFIED REGISTERED

## 2024-10-25 PROCEDURE — 88305 TISSUE EXAM BY PATHOLOGIST: CPT

## 2024-10-25 PROCEDURE — 999N000010 HC STATISTIC ANES STAT CODE-CRNA PER MINUTE: Performed by: SURGERY

## 2024-10-25 PROCEDURE — 82962 GLUCOSE BLOOD TEST: CPT

## 2024-10-25 PROCEDURE — 250N000009 HC RX 250: Performed by: NURSE ANESTHETIST, CERTIFIED REGISTERED

## 2024-10-25 PROCEDURE — 45385 COLONOSCOPY W/LESION REMOVAL: CPT | Performed by: NURSE ANESTHETIST, CERTIFIED REGISTERED

## 2024-10-25 PROCEDURE — 45385 COLONOSCOPY W/LESION REMOVAL: CPT | Mod: PT | Performed by: SURGERY

## 2024-10-25 PROCEDURE — 258N000003 HC RX IP 258 OP 636

## 2024-10-25 PROCEDURE — 45378 DIAGNOSTIC COLONOSCOPY: CPT | Performed by: SURGERY

## 2024-10-25 RX ORDER — NALOXONE HYDROCHLORIDE 0.4 MG/ML
0.4 INJECTION, SOLUTION INTRAMUSCULAR; INTRAVENOUS; SUBCUTANEOUS
Status: DISCONTINUED | OUTPATIENT
Start: 2024-10-25 | End: 2024-10-25 | Stop reason: HOSPADM

## 2024-10-25 RX ORDER — NALOXONE HYDROCHLORIDE 0.4 MG/ML
0.2 INJECTION, SOLUTION INTRAMUSCULAR; INTRAVENOUS; SUBCUTANEOUS
Status: DISCONTINUED | OUTPATIENT
Start: 2024-10-25 | End: 2024-10-25 | Stop reason: HOSPADM

## 2024-10-25 RX ORDER — FLUMAZENIL 0.1 MG/ML
0.2 INJECTION, SOLUTION INTRAVENOUS
Status: DISCONTINUED | OUTPATIENT
Start: 2024-10-25 | End: 2024-10-25 | Stop reason: HOSPADM

## 2024-10-25 RX ORDER — ONDANSETRON 2 MG/ML
4 INJECTION INTRAMUSCULAR; INTRAVENOUS EVERY 6 HOURS PRN
Status: DISCONTINUED | OUTPATIENT
Start: 2024-10-25 | End: 2024-10-25 | Stop reason: HOSPADM

## 2024-10-25 RX ORDER — LIDOCAINE HYDROCHLORIDE 20 MG/ML
INJECTION, SOLUTION INFILTRATION; PERINEURAL PRN
Status: DISCONTINUED | OUTPATIENT
Start: 2024-10-25 | End: 2024-10-25

## 2024-10-25 RX ORDER — PROPOFOL 10 MG/ML
INJECTION, EMULSION INTRAVENOUS PRN
Status: DISCONTINUED | OUTPATIENT
Start: 2024-10-25 | End: 2024-10-25

## 2024-10-25 RX ORDER — PROPOFOL 10 MG/ML
INJECTION, EMULSION INTRAVENOUS CONTINUOUS PRN
Status: DISCONTINUED | OUTPATIENT
Start: 2024-10-25 | End: 2024-10-25

## 2024-10-25 RX ORDER — ONDANSETRON 4 MG/1
4 TABLET, ORALLY DISINTEGRATING ORAL EVERY 6 HOURS PRN
Status: DISCONTINUED | OUTPATIENT
Start: 2024-10-25 | End: 2024-10-25 | Stop reason: HOSPADM

## 2024-10-25 RX ORDER — SODIUM CHLORIDE, SODIUM LACTATE, POTASSIUM CHLORIDE, CALCIUM CHLORIDE 600; 310; 30; 20 MG/100ML; MG/100ML; MG/100ML; MG/100ML
INJECTION, SOLUTION INTRAVENOUS CONTINUOUS
Status: DISCONTINUED | OUTPATIENT
Start: 2024-10-25 | End: 2024-10-25 | Stop reason: HOSPADM

## 2024-10-25 RX ADMIN — LIDOCAINE HYDROCHLORIDE 40 MG: 20 INJECTION, SOLUTION INFILTRATION; PERINEURAL at 10:53

## 2024-10-25 RX ADMIN — SODIUM CHLORIDE, POTASSIUM CHLORIDE, SODIUM LACTATE AND CALCIUM CHLORIDE: 600; 310; 30; 20 INJECTION, SOLUTION INTRAVENOUS at 10:25

## 2024-10-25 RX ADMIN — PROPOFOL 110 MG: 10 INJECTION, EMULSION INTRAVENOUS at 10:53

## 2024-10-25 RX ADMIN — PROPOFOL 160 MCG/KG/MIN: 10 INJECTION, EMULSION INTRAVENOUS at 10:53

## 2024-10-25 ASSESSMENT — ACTIVITIES OF DAILY LIVING (ADL)
ADLS_ACUITY_SCORE: 0

## 2024-10-25 NOTE — H&P
PRE-PROCEDURE NOTE    CHIEFCOMPLAINT / REASON FOR PROCEDURE:  Screening for polyps and colorectal cancer.    PERTINENT HISTORY   Patient is due for colonoscopy. Previous colonoscopy 49. No family history of colon polyps or colon cancer.    Past Medical History:   Diagnosis Date    Encounter for screening for cardiovascular disorders     02-11, Negative stress test    Essential (primary) hypertension     No Comments Provided    Gastro-esophageal reflux disease without esophagitis     No Comments Provided    Major depressive disorder, single episode     After the death of his daughter in MVA    Mild intermittent asthma, uncomplicated     No Comments Provided    Nicotine dependence, uncomplicated     age [age], 1-2 packs per day, Quit Aug 2005    Suicidal ideations     2013,Hospitalized in [place]       Past Surgical History:   Procedure Laterality Date    CV CORONARY ANGIOGRAM N/A 10/29/2021    Procedure: CV CORONARY ANGIOGRAM;  Surgeon: Juan Hdz MD;  Location:  [facility]    LAPAROSCOPIC CHOLECYSTECTOMY      11-05,Dr. Dunne    TYMPANOSTOMY, LOCAL/TOPICAL ANESTHESIA      No Comments Provided         Other:  None  Bleeding tendencies: No     Relevant Family History:  None     Relevant Social History:  None     10 point ROS of systems including Constitutional, Eyes, Respiratory, Cardiovascular, Gastroenterology, Genitourinary, Integumentary, Muscularskeletal, Psychiatric were all negative except for pertinent positives noted in my HPI.      ALLERGIES/SENSITIVITIES: No Known Allergies     CURRENT MEDICATIONS:    No current facility-administered medications for this encounter.           PRE-SEDATION ASSESSMENT:    LUNGS:  CTA B/L, no wheezing or crackles.  Heart & CV:  RRR no murmur.  Intact distal pulses, good cap refill.    Comment(s):      IMPRESSION: 49 year old male in need of screening colonoscopy.    PLAN:  I discussed screening colonoscopy with the patient. Anesthesia coverage  requested.    Nahun Walls MD    10/25/2024 10:06 AM

## 2024-10-25 NOTE — ANESTHESIA POSTPROCEDURE EVALUATION
Patient: Herb Figueroa    Procedure: Procedure(s):  Colonoscopy with Polypectomy       Anesthesia Type:  MAC    Note:  Disposition: Outpatient   Postop Pain Control: Uneventful            Sign Out: Well controlled pain   PONV: No   Neuro/Psych: Uneventful            Sign Out: Acceptable/Baseline neuro status   Airway/Respiratory: Uneventful            Sign Out: Acceptable/Baseline resp. status   CV/Hemodynamics: Uneventful            Sign Out: Acceptable CV status; No obvious hypovolemia; No obvious fluid overload   Other NRE: NONE   DID A NON-ROUTINE EVENT OCCUR?            Last vitals:  Vitals Value Taken Time   /102 10/25/24 1205   Temp     Pulse 65 10/25/24 1200   Resp 16 10/25/24 1119   SpO2 99 % 10/25/24 1201   Vitals shown include unfiled device data.    Electronically Signed By: MIGUEL BURKS CRNA  October 25, 2024  1:39 PM

## 2024-10-25 NOTE — DISCHARGE INSTRUCTIONS
[facility]  Adult Discharge Orders & Instructions      For 24 hours after surgery:  Get plenty of rest.  A responsible adult must stay with you for at least 24 hours after you leave the hospital.   You may feel lightheaded.  IF so, sit for a few minutes before standing.  Have someone help you get up.   You may have a slight fever. Call the doctor if your fever is over 101 F (38.3 C) (taken under the tongue) or lasts longer than 24 hours.  You may have a dry mouth, a sore throat, muscle aches or trouble sleeping.  These should go away after 24 hours.  Do not make important or legal decisions.  6.   Do not drive or use heavy equipment.  If you have weakness or tingling, don't drive or use heavy equipment until this feeling goes away.                                                                                                                                                                         To contact a doctor, call    [phone]______________         INSTRUCTIONS AFTER COLONOSCOPY    WHEN YOU ARE BACK HOME:  Plan to rest for an hour or two after you get home.  You may have some cramping or pressure until you pass gas.  You may resume your regular medications.  Eat a small, light meal at first, and then gradually return to normal meal sizes.  You will be contacted the next day to see how you are doing.  If you had a polyp removed:  Slight bleeding may occur.  You may have a slight blood stain on the toilet paper after a bowel movement.  To lessen the chance of bleeding, avoid heavy exercise for ONE WEEK.  This includes heavy lifting, vigorous sport activities, and heavy physical labor.  You may resume your normal sexual activity.    Avoid aspirin or aspirin products if instructed by your doctor.  If there is a polyp or biopsy, you will be contacted with results within 7-10 days.     WHAT TO WATCH FOR:  Problems rarely occur after the exam; however, it is important for you to watch for early  signs of possible problems.  If you have   Unusual pain in your abdomen  Nausea and vomiting that persists  Excessive bleeding  Black or bloody bowel movements  Fever or temperature above 100.6 F  Please call your doctor  or go to the nearest hospital emergency room.

## 2024-10-25 NOTE — ANESTHESIA CARE TRANSFER NOTE
Patient: Herb Figueroa    Procedure: Procedure(s):  Colonoscopy with Polypectomy       Diagnosis: Colon cancer screening [Z12.11]  Diagnosis Additional Information: No value filed.    Anesthesia Type:   MAC     Note:    Oropharynx: oropharynx clear of all foreign objects  Level of Consciousness: awake  Oxygen Supplementation: nasal cannula  Level of Supplemental Oxygen (L/min / FiO2): 2  Independent Airway: airway patency satisfactory and stable  Dentition: dentition unchanged  Vital Signs Stable: post-procedure vital signs reviewed and stable  Report to RN Given: handoff report given  Patient transferred to: Phase II    Handoff Report: Identifed the Patient, Identified the Reponsible Provider, Reviewed the pertinent medical history, Discussed the surgical course, Reviewed Intra-OP anesthesia mangement and issues during anesthesia, Set expectations for post-procedure period and Allowed opportunity for questions and acknowledgement of understanding  Vitals:  Vitals Value Taken Time   BP     Temp     Pulse     Resp     SpO2         Electronically Signed By: MIGUEL Johnson CRNA  October 25, 2024  11:15 AM

## 2024-10-25 NOTE — OP NOTE
PROCEDURE NOTE    SURGEON:Nahun Walls MD    PRE-OP DIAGNOSIS:  Screening Colonoscopy      POST-OP DIAGNOSIS: Sigmoid colon polyp    PROCEDURE: Colonoscopy cold snare    SPECIMEN:      ID Type Source Tests Collected by Time Destination   1 : SIGMOID COLON POLYP Polyp Large Intestine, Colon, Sigmoid SURGICAL PATHOLOGY EXAM Nahun Walls MD 10/25/2024 11:08 AM        ANESTHESIA:  MAC CRNA Independent: [place] APRN CRNA   Coverage requested     ESTIMATED BLOOD LOSS: none    COMPLICATIONS:  None    INDICATION FOR THE PROCEDURE: The patient is a 49 year old male. The patient presents with need for screening. I explained to the patient the risks, benefits and alternatives to screening colonoscopy for evaluating for cancer or polyps. We discussed the risks including bleeding, perforation, potential inability to reach the cecum and the risks of sedation. The patient's questions were answered and the patient wished to proceed. Informed consent paperwork was completed.    PROCEDURE: The patient was taken to the endoscopy suite. Appropriate monitors were attached. The patient was placed in the left lateral decubitus position. Timeout was performed confirming the patient's identity and procedure to be performed.  After appropriate sedation was confirmed, digital rectal exam was performed.  There was normal tone and no gross abnormality was noted.  The lubricated colonoscope was introduced into the anus the colon was insufflated with air. The prep quality was adequate. Under direct visualization the scope was advanced to the cecum. The mucosa of the colon was inspected while withdrawing the scope. One 5 mm cold snare.  No abnormalities were noted. The scope was retroflexed in the rectum and the anorectal junction was inspected. No abnormalities were noted. The scope was returned to a neutral position and the colon was decompressed. The scope was removed. The patient tolerated the procedure with no immediately  apparent complication. The patient was taken to recovery in stable condition.    FOLLOW UP: RECOMMEND high fiber diet, will call with pathology results.     Nahun Walls MD on 10/25/2024 at 11:15 AM

## 2024-10-25 NOTE — OR NURSING
Patient and responsible adult given discharge instructions with no questions regarding instructions. Tammy score 20. Pain level 0/10.  Discharged from unit via walking . Patient discharged to home.

## 2024-10-25 NOTE — ANESTHESIA PREPROCEDURE EVALUATION
"Anesthesia Pre-Procedure Evaluation    Patient: Herb Figueroa   MRN: [id number] : 1975        Procedure : Procedure(s):  Colonoscopy          Past Medical History:   Diagnosis Date    Encounter for screening for cardiovascular disorders     , Negative stress test    Essential (primary) hypertension     No Comments Provided    Gastro-esophageal reflux disease without esophagitis     No Comments Provided    Major depressive disorder, single episode     After the death of his daughter in MVA    Mild intermittent asthma, uncomplicated     No Comments Provided    Nicotine dependence, uncomplicated     age [age], 1-2 packs per day, Quit Aug 2005    Suicidal ideations     ,Hospitalized in [facility]      Past Surgical History:   Procedure Laterality Date    CV CORONARY ANGIOGRAM N/A 10/29/2021    Procedure: CV CORONARY ANGIOGRAM;  Surgeon: Juan Hdz MD;  Location: [facility] CARDIAC CATH LAB    LAPAROSCOPIC CHOLECYSTECTOMY      ,Dr. Dunne    TYMPANOSTOMY, LOCAL/TOPICAL ANESTHESIA      No Comments Provided      No Known Allergies   Social History     Tobacco Use    Smoking status: Former     Current packs/day: 0.00     Types: Cigarettes, Cigars     Quit date: 1993     Years since quittin.2    Smokeless tobacco: Never    Tobacco comments:     Started smoking cigars again (noted 24)    Substance Use Topics    Alcohol use: Yes     Comment: Occasional      Wt Readings from Last 1 Encounters:   24 111.9 kg (246 lb 9.6 oz)        Anesthesia Evaluation   Pt has had prior anesthetic. Type: MAC and General (Hx of \"slow to wake up\").        ROS/MED HX  ENT/Pulmonary:     (+) sleep apnea, mild, uses CPAP,                   Intermittent, asthma                  Neurologic:  - neg neurologic ROS     Cardiovascular: Comment: Stopped all medications last wednesday     (+) Dyslipidemia hypertension-range: 197/113/ -  CAD -  - stent-                                 Previous cardiac " testing   Echo: Date: Results:    Stress Test:  Date: Results:  Narrative     The nuclear stress test is probably negative for inducible myocardial  ischemia or infarction.     The left ventricular ejection fraction at rest is 63%.  The left  ventricular ejection fraction at stress is 61%.     There is no prior study for comparison.    Procedure: Lexiscan Cardiolite stress test    Findings: The patient had a  successful Lexiscan Cardiolite stress test  which was completed 3/7/2022.  The patient's max heart rate during the  test was 87.  The max blood pressure was 151/79.  The patient was  asymptomatic with during the exam.  The patient did not have any chest  pain or shortness of breath during the procedure.  Resting EKG showed  normal sinus rhythm without T wave abnormality.  Throughout the duration  of the exam the patient demonstrated no new ischemic changes on EKG.    ECG Reviewed:  Date: Results:  61   Sinus rhythm with 1st degree A-V block  Cannot rule out Inferior infarct , age undetermined  Incomplete right bundle branch block  Abnormal ECG  When compared with ECG of 22-Sep-2022 10:35,  Incomplete right bundle branch block is new.  Confirmed by MD PARTH, [facility] ([id number]) on 7/30/2024 9:15:47 AM      Cath:  Date: Results:  Procedure Summary    No Complications - Patient tolerated procedure well  DESCRIPTION:  1. Consent obtained with discussion of risks.  All questions were answered.  2. Sterile prep and procedure.  3. Location with Sheaths:   RT Radial Arterial: A micropuncture 21 guage needle with Sonosite and Fluoroscopic guidance was used to establish vascular access.  Access was successful.  6 Fr Slender 10 cm (short)   4. Access: Local anesthetic with lidocaine.    5. Diagnostic Catheters:The LMCA was successfully engaged with a 5 Fr  Jewel  The RCA was successfully engaged with a 5 Fr  Jewel  6. Guiding Catheters:  None  7. Sheath Management:Single Sheath:  The right radial arterial sheath was manually  "removed in the cardiac catheterization laboratory. A TR band was applied.  8. Estimated blood loss: < 5 mlThe attending interventional cardiologist was present and supervised all critical aspects the procedure.        METS/Exercise Tolerance: >4 METS    Hematologic:  - neg hematologic  ROS     Musculoskeletal:  - neg musculoskeletal ROS     GI/Hepatic:     (+) GERD, Asymptomatic on medication,      bowel prep,            Renal/Genitourinary:       Endo:     (+)  type II DM,   Not using insulin, - not using insulin pump.  not previously admitted for DM/DKA.       Obesity,       Psychiatric/Substance Use:  - neg psychiatric ROS     Infectious Disease:  - neg infectious disease ROS     Malignancy:  - neg malignancy ROS     Other:  - neg other ROS          Physical Exam    Airway        Mallampati: III   TM distance: > 3 FB   Neck ROM: full   Mouth opening: > 3 cm    Respiratory Devices and Support         Dental       (+) Edentulous      Cardiovascular   cardiovascular exam normal       Rhythm and rate: regular and normal     Pulmonary   pulmonary exam normal        breath sounds clear to auscultation           OUTSIDE LABS:  CBC:   Lab Results   Component Value Date    WBC 5.6 10/29/2021    WBC 5.3 09/24/2020    HGB 12.1 (L) 04/12/2022    HGB 14.8 02/25/2022    HCT 41.7 10/29/2021    HCT 40.3 09/24/2020     10/29/2021     09/24/2020     BMP:   Lab Results   Component Value Date     07/29/2024     12/05/2022    POTASSIUM 4.2 07/29/2024    POTASSIUM 4.5 12/05/2022    CHLORIDE 99 07/29/2024    CHLORIDE 101 12/05/2022    CO2 28 07/29/2024    CO2 26 12/05/2022    BUN 14.3 07/29/2024    BUN 27.5 (H) 12/05/2022    CR 1.06 07/29/2024    CR 1.40 (H) 12/05/2022     (H) 07/29/2024     (H) 12/05/2022     COAGS: No results found for: \"PTT\", \"INR\", \"FIBR\"  POC: No results found for: \"BGM\", \"HCG\", \"HCGS\"  HEPATIC:   Lab Results   Component Value Date    ALBUMIN 4.4 07/29/2024    PROTTOTAL 6.9 " 07/29/2024    ALT 13 07/29/2024    AST 15 07/29/2024    ALKPHOS 92 07/29/2024    BILITOTAL 0.6 07/29/2024     OTHER:   Lab Results   Component Value Date    A1C 8.3 (H) 07/29/2024    DUSTIN 9.3 07/29/2024    SED 12 05/08/2015       Anesthesia Plan    ASA Status:  3    NPO Status:  NPO Appropriate    Anesthesia Type: MAC.     - Reason for MAC: straight local not clinically adequate   Induction: Intravenous, Propofol.   Maintenance: N/A.        Consents    Anesthesia Plan(s) and associated risks, benefits, and realistic alternatives discussed. Questions answered and patient/representative(s) expressed understanding.     - Discussed: Risks, Benefits and Alternatives for BOTH SEDATION and the PROCEDURE were discussed     - Discussed with:  Patient      - Specific Concerns: Aspiration.     - Extended Intubation/Ventilatory Support Discussed: No.      - Patient is DNR/DNI Status: No     Use of blood products discussed: No .     Postoperative Care            Comments:               MIGUEL Chavez CRNA    I have reviewed the pertinent notes and labs in the chart from the past 30 days and (re)examined the patient.  Any updates or changes from those notes are reflected in this note.             # Drug Induced Platelet Defect: home medication list includes an antiplatelet medication   # Hypertension: Noted on problem list        # DMII: A1C = 8.3 % (Ref range: 4.0 - 6.2 %) within past 6 months        # Asthma: noted on problem list

## 2024-10-30 LAB
PATH REPORT.COMMENTS IMP SPEC: NORMAL
PATH REPORT.FINAL DX SPEC: NORMAL
PATH REPORT.RELEVANT HX SPEC: NORMAL
PHOTO IMAGE: NORMAL

## 2024-12-02 ENCOUNTER — OFFICE VISIT (OUTPATIENT)
Dept: FAMILY MEDICINE | Facility: OTHER | Age: 49
End: 2024-12-02
Attending: FAMILY MEDICINE
Payer: COMMERCIAL

## 2024-12-02 VITALS
SYSTOLIC BLOOD PRESSURE: 114 MMHG | OXYGEN SATURATION: 97 % | RESPIRATION RATE: 18 BRPM | DIASTOLIC BLOOD PRESSURE: 70 MMHG | BODY MASS INDEX: 35.7 KG/M2 | HEIGHT: 71 IN | TEMPERATURE: 97 F | WEIGHT: 255 LBS | HEART RATE: 72 BPM

## 2024-12-02 DIAGNOSIS — I20.89 STABLE ANGINA (H): ICD-10-CM

## 2024-12-02 DIAGNOSIS — E11.9 TYPE 2 DIABETES MELLITUS WITHOUT COMPLICATION, WITHOUT LONG-TERM CURRENT USE OF INSULIN (H): Primary | ICD-10-CM

## 2024-12-02 DIAGNOSIS — I25.10 CORONARY ARTERY DISEASE INVOLVING NATIVE CORONARY ARTERY OF NATIVE HEART WITHOUT ANGINA PECTORIS: ICD-10-CM

## 2024-12-02 DIAGNOSIS — I10 ESSENTIAL HYPERTENSION: ICD-10-CM

## 2024-12-02 DIAGNOSIS — G47.33 OSA ON CPAP: ICD-10-CM

## 2024-12-02 DIAGNOSIS — M54.12 CERVICAL RADICULOPATHY: ICD-10-CM

## 2024-12-02 DIAGNOSIS — J45.909 MILD ASTHMA WITHOUT COMPLICATION, UNSPECIFIED WHETHER PERSISTENT: ICD-10-CM

## 2024-12-02 PROBLEM — Z98.890 S/P CARDIAC CATH: Status: RESOLVED | Noted: 2020-09-24 | Resolved: 2024-12-02

## 2024-12-02 PROBLEM — Z98.890 STATUS POST CORONARY ANGIOGRAM: Status: RESOLVED | Noted: 2021-10-29 | Resolved: 2024-12-02

## 2024-12-02 LAB
EST. AVERAGE GLUCOSE BLD GHB EST-MCNC: 166 MG/DL
HBA1C MFR BLD: 7.4 %

## 2024-12-02 PROCEDURE — 36415 COLL VENOUS BLD VENIPUNCTURE: CPT | Mod: ZL | Performed by: FAMILY MEDICINE

## 2024-12-02 PROCEDURE — G0463 HOSPITAL OUTPT CLINIC VISIT: HCPCS

## 2024-12-02 PROCEDURE — 83036 HEMOGLOBIN GLYCOSYLATED A1C: CPT | Mod: ZL | Performed by: FAMILY MEDICINE

## 2024-12-02 RX ORDER — ISOSORBIDE MONONITRATE 120 MG/1
120 TABLET, EXTENDED RELEASE ORAL DAILY
Qty: 90 TABLET | Refills: 3 | Status: SHIPPED | OUTPATIENT
Start: 2024-12-02

## 2024-12-02 RX ORDER — FLUTICASONE PROPIONATE 50 MCG
1 SPRAY, SUSPENSION (ML) NASAL DAILY
Qty: 18.2 ML | Refills: 11 | Status: SHIPPED | OUTPATIENT
Start: 2024-12-02

## 2024-12-02 RX ORDER — ALBUTEROL SULFATE 90 UG/1
2 INHALANT RESPIRATORY (INHALATION) EVERY 6 HOURS PRN
Qty: 18 G | Refills: 3 | Status: SHIPPED | OUTPATIENT
Start: 2024-12-02

## 2024-12-02 RX ORDER — CARVEDILOL 25 MG/1
25 TABLET ORAL 2 TIMES DAILY WITH MEALS
Qty: 180 TABLET | Refills: 3 | Status: SHIPPED | OUTPATIENT
Start: 2024-12-02

## 2024-12-02 ASSESSMENT — ASTHMA QUESTIONNAIRES
QUESTION_3 LAST FOUR WEEKS HOW OFTEN DID YOUR ASTHMA SYMPTOMS (WHEEZING, COUGHING, SHORTNESS OF BREATH, CHEST TIGHTNESS OR PAIN) WAKE YOU UP AT NIGHT OR EARLIER THAN USUAL IN THE MORNING: TWO OR THREE NIGHTS A WEEK
QUESTION_2 LAST FOUR WEEKS HOW OFTEN HAVE YOU HAD SHORTNESS OF BREATH: ONCE A DAY
ACT_TOTALSCORE: 16
QUESTION_4 LAST FOUR WEEKS HOW OFTEN HAVE YOU USED YOUR RESCUE INHALER OR NEBULIZER MEDICATION (SUCH AS ALBUTEROL): NOT AT ALL
QUESTION_5 LAST FOUR WEEKS HOW WOULD YOU RATE YOUR ASTHMA CONTROL: POORLY CONTROLLED
ACT_TOTALSCORE: 16
QUESTION_1 LAST FOUR WEEKS HOW MUCH OF THE TIME DID YOUR ASTHMA KEEP YOU FROM GETTING AS MUCH DONE AT WORK, SCHOOL OR AT HOME: NONE OF THE TIME

## 2024-12-02 ASSESSMENT — ANXIETY QUESTIONNAIRES
7. FEELING AFRAID AS IF SOMETHING AWFUL MIGHT HAPPEN: NEARLY EVERY DAY
IF YOU CHECKED OFF ANY PROBLEMS ON THIS QUESTIONNAIRE, HOW DIFFICULT HAVE THESE PROBLEMS MADE IT FOR YOU TO DO YOUR WORK, TAKE CARE OF THINGS AT HOME, OR GET ALONG WITH OTHER PEOPLE: VERY DIFFICULT
GAD7 TOTAL SCORE: 20
4. TROUBLE RELAXING: NEARLY EVERY DAY
2. NOT BEING ABLE TO STOP OR CONTROL WORRYING: NEARLY EVERY DAY
5. BEING SO RESTLESS THAT IT IS HARD TO SIT STILL: MORE THAN HALF THE DAYS
1. FEELING NERVOUS, ANXIOUS, OR ON EDGE: NEARLY EVERY DAY
GAD7 TOTAL SCORE: 20
6. BECOMING EASILY ANNOYED OR IRRITABLE: NEARLY EVERY DAY
3. WORRYING TOO MUCH ABOUT DIFFERENT THINGS: NEARLY EVERY DAY
8. IF YOU CHECKED OFF ANY PROBLEMS, HOW DIFFICULT HAVE THESE MADE IT FOR YOU TO DO YOUR WORK, TAKE CARE OF THINGS AT HOME, OR GET ALONG WITH OTHER PEOPLE?: VERY DIFFICULT
GAD7 TOTAL SCORE: 20
7. FEELING AFRAID AS IF SOMETHING AWFUL MIGHT HAPPEN: NEARLY EVERY DAY

## 2024-12-02 ASSESSMENT — PATIENT HEALTH QUESTIONNAIRE - PHQ9
SUM OF ALL RESPONSES TO PHQ QUESTIONS 1-9: 20
SUM OF ALL RESPONSES TO PHQ QUESTIONS 1-9: 20
10. IF YOU CHECKED OFF ANY PROBLEMS, HOW DIFFICULT HAVE THESE PROBLEMS MADE IT FOR YOU TO DO YOUR WORK, TAKE CARE OF THINGS AT HOME, OR GET ALONG WITH OTHER PEOPLE: EXTREMELY DIFFICULT

## 2024-12-02 ASSESSMENT — PAIN SCALES - GENERAL: PAINLEVEL_OUTOF10: EXTREME PAIN (8)

## 2024-12-02 NOTE — NURSING NOTE
Patient is here for follow up of diabetes. Has a form he is needing completed. States his legs feel like jello and when he puts on his shoes his feet swell and hurt all day. He would like to discuss issues with his neck.     Previous A1C is not at goal of <8  Lab Results   Component Value Date    A1C 8.3 07/29/2024    A1C 8.0 12/05/2022    A1C 8.2 04/12/2022    A1C 7.9 02/25/2022    A1C 6.4 11/20/2020    A1C 8.3 08/18/2020    A1C 11.4 11/27/2018    A1C 6.9 08/21/2018    A1C 9.5 04/20/2018     Urine Microalbumin/Creat:    Albumin Urine mg/L   Date Value Ref Range Status   07/29/2024 133.6 mg/L Final     Comment:     The reference ranges have not been established in urine albumin. The results should be integrated into the clinical context for interpretation.   02/25/2022 87 mg/L Final     Albumin Urine mg/g Cr   Date Value Ref Range Status   07/29/2024 51.05 (H) 0.00 - 17.00 mg/g Cr Final     Comment:     Microalbuminuria is defined as an albumin:creatinine ratio of 17 to 299 for males and 25 to 299 for females. A ratio of albumin:creatinine of 300 or higher is indicative of overt proteinuria.  Due to biologic variability, positive results should be confirmed by a second, first-morning random or 24-hour timed urine specimen. If there is discrepancy, a third specimen is recommended. When 2 out of 3 results are in the microalbuminuria range, this is evidence for incipient nephropathy and warrants increased efforts at glucose control, blood pressure control, and institution of therapy with an angiotensin-converting-enzyme (ACE) inhibitor (if the patient can tolerate it).     02/25/2022 97.75 (H) 0.00 - 17.00 mg/g Cr Final     Creatinine Urine mg/dL   Date Value Ref Range Status   07/29/2024 261.7 mg/dL Final     Comment:     The reference ranges have not been established in urine creatinine. The results should be integrated into the clinical context for interpretation.   02/25/2022 89 mg/dL Final     Foot exam  8/2024  Eye exam 3/2024    Tobacco User no  Patient is on a daily aspirin  Patient is on a Statin.  Blood pressure today of:     BP Readings from Last 1 Encounters:   12/02/24 114/70      is at the goal of <139/89 for diabetics.    Vanessa Matthews LPN on 12/2/2024 at 9:35 AM

## 2024-12-02 NOTE — PROGRESS NOTES
Assessment & Plan     Stable angina (H)  Stable, continue current regimen.  - carvedilol (COREG) 25 MG tablet; Take 1 tablet (25 mg) by mouth 2 times daily (with meals).  - isosorbide mononitrate CR (IMDUR) 120 MG 24 HR ER tablet; Take 1 tablet (120 mg) by mouth daily.    Essential hypertension  Stable, continue current regimen.  - carvedilol (COREG) 25 MG tablet; Take 1 tablet (25 mg) by mouth 2 times daily (with meals).  - isosorbide mononitrate CR (IMDUR) 120 MG 24 HR ER tablet; Take 1 tablet (120 mg) by mouth daily.    Type 2 diabetes mellitus without complication, without long-term current use of insulin (H)  Update hemoglobin A1c.  Consider increasing Victoza if needed.  Also consider adding Jardiance given his history of heart disease.  - insulin pen needle (31G X 8 MM) 31G X 8 MM miscellaneous; 1 Box of 100 insulin pen needles to be dispensed with every insulin pen prescription  - Hemoglobin A1c; Future    Coronary artery disease involving native coronary artery of native heart without angina pectoris  Stable, continue current regimen  - isosorbide mononitrate CR (IMDUR) 120 MG 24 HR ER tablet; Take 1 tablet (120 mg) by mouth daily.    SCOTT on CPAP  Continue CPAP.  He is interested in inspire treatment.  We filled up information online.  He will need to contact his insurance to see if this will be covered.    Mild asthma   Albuterol was filled.  He will follow-up if he is using this more than twice a week.  - albuterol (PROAIR HFA/PROVENTIL HFA/VENTOLIN HFA) 108 (90 Base) MCG/ACT inhaler; Inhale 2 puffs into the lungs every 6 hours as needed for shortness of breath, wheezing or cough.  - fluticasone (FLONASE) 50 MCG/ACT nasal spray; Spray 1 spray into both nostrils daily.    Cervical radiculopathy  Reviewed prior MRIs.  He was supposed to follow-up with his spine surgeon a couple years ago and never did.  A new referral was sent in to follow-up for further recommendations.  Continue gabapentin.  - Spine   Referral; Future    The longitudinal plan of care for the diagnosis(es)/condition(s) as documented were addressed during this visit. Due to the added complexity in care, I will continue to support Ben in the subsequent management and with ongoing continuity of care.        Depression Screening Follow Up        12/2/2024     9:19 AM   PHQ   PHQ-9 Total Score 20    Q9: Thoughts of better off dead/self-harm past 2 weeks Several days    F/U: Thoughts of suicide or self-harm No    F/U: Safety concerns No        Patient-reported       Follow Up Actions Taken    Discussed the following ways the patient can remain in a safe environment:      No follow-ups on file.    Subjective   Ben is a 49 year old, presenting for the following health issues:  Diabetes      12/2/2024     9:29 AM   Additional Questions   Roomed by Vanessa gomez         12/2/2024   Forms   Any forms needing to be completed Yes        Patient has a history of diabetes.  At his last visit we increased Victoza to 1.2 mg once a day.  He is tolerated this change well without any side effects.  He continues to check his blood sugars on a daily basis as blood sugars are in the low to mid 100s.  No hypoglycemia.    He has a history of mild intermittent asthma.  He reports that recently with a cold air he has been having more shortness of breath, cough and wheezing.  He needs a refill of his albuterol.  He also uses Flonase with good success at this time year.    He continues to have cervical radiculopathy pain.  His pain seems to be worse with side-to-side motion of his head going down into his left arm predominantly.  He has had anterior decompression and fusion performed previously, last MRI was in 2022 and this was reviewed with him.    History of Present Illness     Asthma:  He presents for follow up of asthma.  He has no cough, some wheezing, and some shortness of breath. He is not using a relief medication.  He does not have a controller medication.  "Patient is aware of the following triggers: cold air. The patient has not had a visit to the Emergency Room, Urgent Care or Hospital due to asthma since the last clinic visit.     Back Pain:  He presents for follow up of back pain. Patient's back pain is a chronic problem.  Location of back pain:  Right lower back, left lower back, right middle of back, left middle of back, right upper back, left upper back, right side of neck, left side of neck, right shoulder, left shoulder, right hip and left hip  Description of back pain: gnawing, sharp, shooting and stabbing  Back pain spreads: right buttocks, left buttocks, right thigh, left thigh, right knee, left knee, right shoulder, left shoulder, right side of neck and left side of neck    Since patient first noticed back pain, pain is: always present, but gets better and worse  Does back pain interfere with his job:  Yes       Hyperlipidemia:  He presents for follow up of hyperlipidemia.   He is taking medication to lower cholesterol. He is not having myalgia or other side effects to statin medications.    Reason for visit:  Follow up    He eats 0-1 servings of fruits and vegetables daily.He consumes 2 sweetened beverage(s) daily.He exercises with enough effort to increase his heart rate 10 to 19 minutes per day.  He exercises with enough effort to increase his heart rate 4 days per week. He is missing 1 dose(s) of medications per week.  He is not taking prescribed medications regularly due to remembering to take.         Objective    /70   Pulse 72   Temp 97  F (36.1  C) (Tympanic)   Resp 18   Ht 1.803 m (5' 11\")   Wt 115.7 kg (255 lb)   SpO2 97%   BMI 35.57 kg/m    Body mass index is 35.57 kg/m .  Physical Exam   GENERAL: alert and no distress  RESP: lungs clear to auscultation - no rales, rhonchi or wheezes  CV: regular rate and rhythm, normal S1 S2, no S3 or S4, no murmur, click or rub, no peripheral edema           Signed Electronically by: Sg " MD Nessa

## 2024-12-11 DIAGNOSIS — E11.9 TYPE 2 DIABETES MELLITUS WITHOUT COMPLICATION, WITHOUT LONG-TERM CURRENT USE OF INSULIN (H): ICD-10-CM

## 2024-12-12 RX ORDER — LIRAGLUTIDE 6 MG/ML
1.2 INJECTION SUBCUTANEOUS DAILY
Qty: 3 ML | Refills: 3 | Status: SHIPPED | OUTPATIENT
Start: 2024-12-12

## 2024-12-12 NOTE — TELEPHONE ENCOUNTER
[id number] sent Rx request for the following:      Requested Prescriptions   Pending Prescriptions Disp Refills    liraglutide (VICTOZA) 18 MG/3ML solution [Pharmacy Med Name: LIRAGLUTIDE 18MG/3ML PEN INJ] 3 mL 3     Sig: INJECT 1.2 MG SUBCUTANEOUSLY DAILY.   Last Prescription Date:   8/26/24  Last Fill Qty/Refills:         3 ml, R-3    Last Office Visit:              12/2/24 (DM discussed)   Future Office visit:           None    Prescription refilled per RN Medication Refill Policy.................... Stefanie Mae RN ....................  12/12/2024   11:47 AM

## 2025-01-22 ENCOUNTER — OFFICE VISIT (OUTPATIENT)
Dept: FAMILY MEDICINE | Facility: OTHER | Age: 50
End: 2025-01-22
Payer: COMMERCIAL

## 2025-01-22 ENCOUNTER — HOSPITAL ENCOUNTER (OUTPATIENT)
Dept: GENERAL RADIOLOGY | Facility: OTHER | Age: 50
Discharge: HOME | End: 2025-01-22
Attending: NURSE PRACTITIONER
Payer: COMMERCIAL

## 2025-01-22 VITALS
HEIGHT: 71 IN | SYSTOLIC BLOOD PRESSURE: 130 MMHG | OXYGEN SATURATION: 98 % | RESPIRATION RATE: 20 BRPM | HEART RATE: 72 BPM | TEMPERATURE: 98.3 F | WEIGHT: 255 LBS | DIASTOLIC BLOOD PRESSURE: 82 MMHG | BODY MASS INDEX: 35.7 KG/M2

## 2025-01-22 DIAGNOSIS — J06.9 BACTERIAL UPPER RESPIRATORY INFECTION: ICD-10-CM

## 2025-01-22 DIAGNOSIS — R68.83 CHILLS: ICD-10-CM

## 2025-01-22 DIAGNOSIS — J02.9 SORE THROAT: ICD-10-CM

## 2025-01-22 DIAGNOSIS — B96.89 BACTERIAL UPPER RESPIRATORY INFECTION: ICD-10-CM

## 2025-01-22 DIAGNOSIS — R05.1 ACUTE COUGH: Primary | ICD-10-CM

## 2025-01-22 DIAGNOSIS — J45.21 MILD INTERMITTENT ASTHMA WITH EXACERBATION: ICD-10-CM

## 2025-01-22 DIAGNOSIS — J34.89 SINUS PAIN: ICD-10-CM

## 2025-01-22 LAB
FLUAV RNA SPEC QL NAA+PROBE: NEGATIVE
FLUBV RNA RESP QL NAA+PROBE: NEGATIVE
RSV RNA SPEC NAA+PROBE: NEGATIVE
S PYO DNA THROAT QL NAA+PROBE: NOT DETECTED
SARS-COV-2 RNA RESP QL NAA+PROBE: NEGATIVE

## 2025-01-22 PROCEDURE — 87637 SARSCOV2&INF A&B&RSV AMP PRB: CPT | Mod: ZL | Performed by: NURSE PRACTITIONER

## 2025-01-22 PROCEDURE — 71046 X-RAY EXAM CHEST 2 VIEWS: CPT

## 2025-01-22 PROCEDURE — 87651 STREP A DNA AMP PROBE: CPT | Mod: ZL | Performed by: NURSE PRACTITIONER

## 2025-01-22 RX ORDER — PREDNISONE 20 MG/1
40 TABLET ORAL DAILY
Qty: 10 TABLET | Refills: 0 | Status: SHIPPED | OUTPATIENT
Start: 2025-01-22 | End: 2025-01-27

## 2025-01-22 RX ORDER — BENZONATATE 200 MG/1
200 CAPSULE ORAL 3 TIMES DAILY PRN
Qty: 30 CAPSULE | Refills: 0 | Status: SHIPPED | OUTPATIENT
Start: 2025-01-22

## 2025-01-22 RX ORDER — AZITHROMYCIN 250 MG/1
TABLET, FILM COATED ORAL
Qty: 6 TABLET | Refills: 0 | Status: SHIPPED | OUTPATIENT
Start: 2025-01-22 | End: 2025-01-27

## 2025-01-22 RX ORDER — CODEINE PHOSPHATE AND GUAIFENESIN 10; 100 MG/5ML; MG/5ML
1 SOLUTION ORAL EVERY 8 HOURS PRN
Qty: 120 ML | Refills: 0 | Status: SHIPPED | OUTPATIENT
Start: 2025-01-22

## 2025-01-22 ASSESSMENT — ENCOUNTER SYMPTOMS
HEMATOLOGIC/LYMPHATIC NEGATIVE: 1
COUGH: 1
APPETITE CHANGE: 1
SORE THROAT: 1
FEVER: 1
CARDIOVASCULAR NEGATIVE: 1
ALLERGIC/IMMUNOLOGIC NEGATIVE: 1
CHILLS: 1
FATIGUE: 1
EYES NEGATIVE: 1
GASTROINTESTINAL NEGATIVE: 1
ACTIVITY CHANGE: 1
NEUROLOGICAL NEGATIVE: 1
PSYCHIATRIC NEGATIVE: 1
ENDOCRINE NEGATIVE: 1
SINUS PRESSURE: 1
MUSCULOSKELETAL NEGATIVE: 1
SINUS PAIN: 1

## 2025-01-22 ASSESSMENT — ASTHMA QUESTIONNAIRES
QUESTION_2 LAST FOUR WEEKS HOW OFTEN HAVE YOU HAD SHORTNESS OF BREATH: THREE TO SIX TIMES A WEEK
ACT_TOTALSCORE: 18
QUESTION_5 LAST FOUR WEEKS HOW WOULD YOU RATE YOUR ASTHMA CONTROL: WELL CONTROLLED
QUESTION_4 LAST FOUR WEEKS HOW OFTEN HAVE YOU USED YOUR RESCUE INHALER OR NEBULIZER MEDICATION (SUCH AS ALBUTEROL): TWO OR THREE TIMES PER WEEK
ACT_TOTALSCORE: 18
QUESTION_1 LAST FOUR WEEKS HOW MUCH OF THE TIME DID YOUR ASTHMA KEEP YOU FROM GETTING AS MUCH DONE AT WORK, SCHOOL OR AT HOME: A LITTLE OF THE TIME
QUESTION_3 LAST FOUR WEEKS HOW OFTEN DID YOUR ASTHMA SYMPTOMS (WHEEZING, COUGHING, SHORTNESS OF BREATH, CHEST TIGHTNESS OR PAIN) WAKE YOU UP AT NIGHT OR EARLIER THAN USUAL IN THE MORNING: ONCE OR TWICE

## 2025-01-22 ASSESSMENT — PATIENT HEALTH QUESTIONNAIRE - PHQ9: SUM OF ALL RESPONSES TO PHQ QUESTIONS 1-9: 0

## 2025-01-22 ASSESSMENT — PAIN SCALES - GENERAL: PAINLEVEL_OUTOF10: SEVERE PAIN (7)

## 2025-01-22 NOTE — NURSING NOTE
"Chief Complaint   Patient presents with    Cough    Nasal Congestion    Fever    Chills    Shortness of Breath     Patient here for hot/cold flashes, chest congestion, cough, nasal congestion, SOB x1 week. States that he also had diarrhea but this is resolved. Has treated with OTC cold meds with little relief.     Initial /82   Pulse 72   Temp 98.3  F (36.8  C) (Tympanic)   Resp 20   Ht 1.791 m (5' 10.5\")   Wt 115.7 kg (255 lb)   SpO2 98%   BMI 36.07 kg/m   Estimated body mass index is 36.07 kg/m  as calculated from the following:    Height as of this encounter: 1.791 m (5' 10.5\").    Weight as of this encounter: 115.7 kg (255 lb).  Medication Review: complete    The next two questions are to help us understand your food security.  If you are feeling you need any assistance in this area, we have resources available to support you today.          1/22/2025   SDOH- Food Insecurity   Within the past 12 months, did you worry that your food would run out before you got money to buy more? N   Within the past 12 months, did the food you bought just not last and you didn t have money to get more? N         Health Care Directive:  Patient does not have a Health Care Directive: Discussed advance care planning with patient; however, patient declined at this time.    Marissa Adkins, LPN      "

## 2025-01-22 NOTE — PROGRESS NOTES
Herb Figueroa  1975    ASSESSMENT/PLAN      Presents to [facility] clinic with cough, congestion, fever, chills, shortness of breath worsening over the last week.  Patient has signs of systemic illness with fever, body aches.  Patient's symptoms began about a week ago and he improved until yesterday when he had a sudden turn of worsening symptoms.  Patient son developed symptoms yesterday as well.  Patient does have a history of asthma, uses an inhaler.  Chest x-ray obtained and shows no pneumonia or other acute change.  COVID, influenza, RSV and strep are negative.  Given patient's history of asthma we will treat for asthma exacerbation, bacterial infection with Zithromax, prednisone and ongoing use of his albuterol inhaler.  For his cough will treat with Tessalon and Robitussin AC.. Patient's vitals are stable and he appears nontoxic.        1. Acute cough (Primary)  2. Sinus pain  3. Chills  4. Sore throat    - Influenza A/B, RSV and SARS-CoV2 PCR (COVID-19) Nose  - Group A Streptococcus PCR Throat Swab  - XR Chest 2 Views     5. Bacterial upper respiratory infection  6. Mild intermittent asthma with exacerbation    - azithromycin (ZITHROMAX) 250 MG tablet; Take 2 tablets (500 mg) by mouth daily for 1 day, THEN 1 tablet (250 mg) daily for 4 days.  Dispense: 6 tablet; Refill: 0  - predniSONE (DELTASONE) 20 MG tablet; Take 2 tablets (40 mg) by mouth daily for 5 days.  Dispense: 10 tablet; Refill: 0  - guaiFENesin-codeine (ROBITUSSIN AC) 100-10 MG/5ML solution; Take 5 mLs by mouth every 8 hours as needed for cough.  Dispense: 120 mL; Refill: 0  - benzonatate (TESSALON) 200 MG capsule; Take 1 capsule (200 mg) by mouth 3 times daily as needed for cough.  Dispense: 30 capsule; Refill: 0  - Symptomatic treatment - Encouraged fluids, salt water gargles, honey, humidifier, saline nasal spray, lozenges, tea, soup, smoothies, popsicles, topical vapor rub, rest, etc   - May use over-the-counter Tylenol or ibuprofen PRN  -  Follow up as needed for new or worsening symptoms          *Explanation of diagnosis, treatment options and risk and benefits of medications reviewed with patient. Patient agrees with plan of care.  *All questions were answered.    *Red flags symptoms were discussed and patient was advised when they should return for reevaluation or for prompt emergency evaluation.   *Patient was given verbal and written instructions on plan of care. Instructions were printed or are available on Mychart on electronic AVS.   *We discussed potential side effects of any prescribed or recommended therapies, as well as expectations for response to treatments.  *Patient discharged in stable condition    Natalia Kline CNP  [facility]    SUBJECTIVE  CHIEF COMPLAINT/ REASON FOR VISIT  Patient presents with:  Cough  Nasal Congestion  Fever  Chills  Shortness of Breath     HISTORY OF PRESENT ILLNESS  Herb Figueroa is a pleasant 49 year old male presents to rapid clinic today with cough, congestion, fever, chills, shortness of breath worsening over the last week.  Patient's symptoms began about a week ago and he improved until yesterday when he had a sudden turn of worsening symptoms.  Patient son developed symptoms yesterday as well.  Patient does have a history of asthma, uses an inhaler.        I have reviewed the nursing notes.  I have reviewed allergies, medication list, problem list, and past medical history.    REVIEW OF SYSTEMS  Review of Systems   Constitutional:  Positive for activity change, appetite change, chills, fatigue and fever.   HENT:  Positive for congestion, sinus pressure, sinus pain and sore throat.    Eyes: Negative.    Respiratory:  Positive for cough.    Cardiovascular: Negative.    Gastrointestinal: Negative.    Endocrine: Negative.    Genitourinary: Negative.    Musculoskeletal: Negative.    Skin: Negative.    Allergic/Immunologic: Negative.    Neurological: Negative.    Hematological:  "Negative.    Psychiatric/Behavioral: Negative.     All other systems reviewed and are negative.       VITAL SIGNS  Vitals:    01/22/25 0929   BP: 130/82   Pulse: 72   Resp: 20   Temp: 98.3  F (36.8  C)   TempSrc: Tympanic   SpO2: 98%   Weight: 115.7 kg (255 lb)   Height: 1.791 m (5' 10.5\")      Body mass index is 36.07 kg/m .      OBJECTIVE  PHYSICAL EXAM  Physical Exam  Vitals and nursing note reviewed.   Constitutional:       Appearance: Normal appearance.   HENT:      Head: Normocephalic.      Right Ear: Tympanic membrane normal.      Left Ear: Tympanic membrane normal.      Nose: Nose normal.      Mouth/Throat:      Mouth: Mucous membranes are moist.      Pharynx: Posterior oropharyngeal erythema present.   Eyes:      Pupils: Pupils are equal, round, and reactive to light.   Cardiovascular:      Rate and Rhythm: Normal rate and regular rhythm.      Pulses: Normal pulses.      Heart sounds: Normal heart sounds.   Pulmonary:      Effort: Pulmonary effort is normal.      Breath sounds: Wheezing and rhonchi present.   Abdominal:      General: Bowel sounds are normal.   Musculoskeletal:         General: Normal range of motion.      Cervical back: Normal range of motion.   Skin:     General: Skin is warm and dry.      Capillary Refill: Capillary refill takes less than 2 seconds.   Neurological:      General: No focal deficit present.      Mental Status: He is alert.            DIAGNOSTICS  Results for orders placed or performed in visit on 01/22/25   XR Chest 2 Views     Status: None    Narrative    PROCEDURE: XR CHEST 2 VIEWS 1/22/2025 10:01 AM    HISTORY: Acute cough; Chills; Sore throat    COMPARISONS: 10/15/2021.    TECHNIQUE: 2 views.    FINDINGS: Heart and pulmonary vasculature are normal. Lungs are clear  and no pleural effusion is seen. There is mild degenerative change in  the spine. There are postsurgical changes in the lower cervical  region. Surgical clips are seen in the right upper quadrant.         " Impression    IMPRESSION: No acute disease.    YUN WESLEY MD         SYSTEM ID:  [id number]   Influenza A/B, RSV and SARS-CoV2 PCR (COVID-19) Nose     Status: Normal    Specimen: Nose; Swab   Result Value Ref Range    Influenza A PCR Negative Negative    Influenza B PCR Negative Negative    RSV PCR Negative Negative    SARS CoV2 PCR Negative Negative    Narrative    Testing was performed using the Xpert Xpress CoV2/Flu/RSV Assay on the [employer] Instrument. This test should be ordered for the detection of SARS-CoV2, influenza, and RSV viruses in individuals with signs and symptoms of respiratory tract infection. This test is for in vitro diagnostic use under the [place] FDA for laboratories certified under CLIA to perform high or moderate complexity testing. This test has been [place] FDA cleared. A negative result does not rule out the presence of PCR inhibitors in the specimen or target RNA in concentration below the limit of detection for the assay. If only one viral target is positive but coinfection with multiple targets is suspected, the sample should be re-tested with another FDA cleared, approved, or authorized test, if coninfection would change clinical management. This test was validated by the [facility]. These laboratories are certified under the Clinical Laboratory Improvement Amendments of 1988 (CLIA-88) as qualified to perfom high complexity laboratory testing.   Group A Streptococcus PCR Throat Swab     Status: Normal    Specimen: Throat; Swab   Result Value Ref Range    Group A strep by PCR Not Detected Not Detected    Narrative    The Xpert Xpress Strep A test, performed on the [employer], is a rapid, qualitative in vitro diagnostic test for the detection of Streptococcus pyogenes (Group A ß-hemolytic Streptococcus, Strep A) in throat swab specimens from patients with signs and symptoms of pharyngitis. The Xpert Xpress Strep A test can be used as an aid in  the diagnosis of Group A Streptococcal pharyngitis. The assay is not intended to monitor treatment for Group A Streptococcus infections. The Xpert Xpress Strep A test utilizes an automated real-time polymerase chain reaction (PCR) to detect Streptococcus pyogenes DNA.

## 2025-02-20 ENCOUNTER — MEDICAL CORRESPONDENCE (OUTPATIENT)
Dept: HEALTH INFORMATION MANAGEMENT | Facility: OTHER | Age: 50
End: 2025-02-20
Payer: COMMERCIAL

## 2025-03-14 ENCOUNTER — TELEPHONE (OUTPATIENT)
Dept: FAMILY MEDICINE | Facility: OTHER | Age: 50
End: 2025-03-14
Payer: COMMERCIAL

## 2025-03-14 NOTE — TELEPHONE ENCOUNTER
Patient states his insurance will only cover the name brand of Victoza. [facility] does not have it and they cannot get it as it is on backorder. Pharmacy told patient to call clinic to see if there is something else that can be prescribed.     Eva Gil CMA on 3/14/2025 at 12:57 PM

## 2025-03-14 NOTE — TELEPHONE ENCOUNTER
Options:   Find a pharmacy that carries name brand victoza.  Work with pharmacy to see what else in the class (GLP1) might be covered.

## 2025-03-14 NOTE — TELEPHONE ENCOUNTER
Returned call to patient ; no answer.  Will call back.    Maryse Javed LPN on 3/14/2025 at 2:36 PM

## 2025-03-18 NOTE — TELEPHONE ENCOUNTER
Patient contacted. He stated he called 2 other pharmacies and they don't have the name brand Victoza either. He was informed to call his insurance company to see what alternatives they cover similar to Victoza and let us know. Will await call back from patient.     Eva Gil CMA on 3/18/2025 at 1:25 PM

## 2025-03-24 NOTE — NURSING NOTE
"Patient comes in for one month follow up.  Judit Jane LPN ....................10/22/2020   8:50 AM  Chief Complaint   Patient presents with     Follow Up     one month       Initial /86 (BP Location: Right arm, Patient Position: Sitting, Cuff Size: Adult Large)   Pulse 73   Temp 95  F (35  C) (Tympanic)   Resp 18   Ht 1.8 m (5' 10.87\")   Wt 109.3 kg (241 lb)   SpO2 97%   BMI 33.74 kg/m   Estimated body mass index is 33.74 kg/m  as calculated from the following:    Height as of this encounter: 1.8 m (5' 10.87\").    Weight as of this encounter: 109.3 kg (241 lb).  Meds Reconciled: complete  Pt is on Aspirin  Pt is on a Statin  PHQ and/or MARGY reviewed. Pt referred to PCP/MH Provider as appropriate.    Judit Jane LPN      "
[Time Spent: ___ minutes] : I have spent [unfilled] minutes of time on the encounter which excludes teaching and separately reported services.

## 2025-04-01 NOTE — TELEPHONE ENCOUNTER
Pt [facility], please try him again.     Carlene Cobb on 4/1/2025 at 11:04 AM

## 2025-04-01 NOTE — TELEPHONE ENCOUNTER
Patient states he contacted his insurance and they did an override so he can get the generic being the name brand isn't available at any pharmacies. Patient was told that after 90-days if he still has the same issue he may call them again and they can do another override.     Eva Gil CMA on 4/1/2025 at 1:00 PM

## 2025-04-18 DIAGNOSIS — E11.9 TYPE 2 DIABETES MELLITUS WITHOUT COMPLICATION, WITHOUT LONG-TERM CURRENT USE OF INSULIN (H): ICD-10-CM

## 2025-04-21 RX ORDER — LIRAGLUTIDE 6 MG/ML
INJECTION SUBCUTANEOUS
Qty: 3 ML | Refills: 3 | Status: SHIPPED | OUTPATIENT
Start: 2025-04-21

## 2025-04-21 NOTE — TELEPHONE ENCOUNTER
[id number] sent Rx request for the following:      Requested Prescriptions   Pending Prescriptions Disp Refills    liraglutide (VICTOZA) 18 MG/3ML solution [Pharmacy Med Name: LIRAGLUTIDE 18MG/3ML PEN INJ] 3 mL 3     Sig: INJECT 1.2 MG SUBCUTANEOUSLY DAILY.   Last Prescription Date:   2/21/25  Last Fill Qty/Refills:         3 ml, R-3    Last Office Visit:              12/2/24   Future Office visit:           None    Prescription refilled per RN Medication Refill Policy.................... Stefanie Mae RN ....................  4/21/2025   10:33 AM

## 2025-04-22 DIAGNOSIS — E11.9 TYPE 2 DIABETES MELLITUS WITHOUT COMPLICATION, WITHOUT LONG-TERM CURRENT USE OF INSULIN (H): ICD-10-CM

## 2025-04-23 RX ORDER — OMEPRAZOLE 40 MG/1
CAPSULE, DELAYED RELEASE ORAL
Qty: 90 CAPSULE | Refills: 1 | Status: SHIPPED | OUTPATIENT
Start: 2025-04-23

## 2025-04-23 NOTE — TELEPHONE ENCOUNTER
[id number] sent Rx request for the following:      Requested Prescriptions   Pending Prescriptions Disp Refills    omeprazole (PRILOSEC) 40 MG DR capsule 90 capsule 3     Sig: TAKE 1 CAPSULE BY MOUTH ONCE DAILY WITH FOOD,       PPI Protocol Failed - 4/23/2025  1:57 PM        Failed - Medication indicated for associated diagnosis     The medication is prescribed for one or more of the following conditions:     Erosive esophagitis    Gastritis   Gastric hypersecretion   Gastric ulcer   Gastroesophageal reflux disease   Helicobacter pylori gastrointestinal tract infection   Ulcer of duodenum   Drug-induced peptic ulcer   Esophageal stricture   Gastrointestinal hemorrhage   Indigestion   Stress ulcer   Zollinger-Odonnell syndrome   Cuadra s esophagus   Laryngopharyngeal reflux   Epigastric Pain   Morbid Obesity   Cough   History of Peptic Ulcer   Esophageal Atresia, Stenosis and Fistula   Cystic Fibrosis  Bronchiectasis     Last Prescription Date:   3/29/24  Last Fill Qty/Refills:         90, R-3    Last Office Visit:              12/2/24   Future Office visit:           None    Unable to complete prescription refill per RN Medication Refill Policy. Routing to covering provider for refill consideration, as PCP/provider is out of clinic >48 hours or Pt is completely out of medication and provider is out of the clinic today. Stefanie Mae, Refill RN .............. 4/23/2025  1:59 PM

## 2025-07-28 ENCOUNTER — PATIENT OUTREACH (OUTPATIENT)
Dept: CARE COORDINATION | Facility: CLINIC | Age: 50
End: 2025-07-28
Payer: COMMERCIAL

## 2025-08-22 DIAGNOSIS — E78.2 MIXED HYPERLIPIDEMIA: ICD-10-CM

## 2025-08-22 DIAGNOSIS — Z95.5 HISTORY OF CORONARY ARTERY STENT PLACEMENT: ICD-10-CM

## 2025-08-22 DIAGNOSIS — I25.119 CORONARY ARTERY DISEASE INVOLVING NATIVE CORONARY ARTERY OF NATIVE HEART WITH ANGINA PECTORIS: ICD-10-CM

## 2025-08-22 DIAGNOSIS — I20.89 STABLE ANGINA: ICD-10-CM

## 2025-08-22 DIAGNOSIS — Z98.890 S/P CARDIAC CATH: ICD-10-CM

## 2025-08-22 DIAGNOSIS — I25.118 CORONARY ARTERY DISEASE OF NATIVE ARTERY OF NATIVE HEART WITH STABLE ANGINA PECTORIS: ICD-10-CM

## 2025-08-22 DIAGNOSIS — Z95.5 STATUS POST INSERTION OF DRUG-ELUTING STENT INTO LEFT ANTERIOR DESCENDING (LAD) ARTERY: ICD-10-CM

## 2025-08-22 DIAGNOSIS — Z98.890 STATUS POST CORONARY ANGIOGRAM: ICD-10-CM

## 2025-08-22 DIAGNOSIS — R94.39 ABNORMAL STRESS TEST: ICD-10-CM

## 2025-08-22 DIAGNOSIS — E11.9 TYPE 2 DIABETES MELLITUS WITHOUT COMPLICATION, WITHOUT LONG-TERM CURRENT USE OF INSULIN (H): ICD-10-CM

## 2025-08-22 DIAGNOSIS — I25.10 CORONARY ARTERY DISEASE INVOLVING NATIVE CORONARY ARTERY OF NATIVE HEART WITHOUT ANGINA PECTORIS: ICD-10-CM

## 2025-08-25 RX ORDER — ROSUVASTATIN CALCIUM 40 MG/1
40 TABLET, COATED ORAL DAILY
Qty: 90 TABLET | Refills: 3 | Status: SHIPPED | OUTPATIENT
Start: 2025-08-25

## 2025-08-25 RX ORDER — ASPIRIN 81 MG/1
81 TABLET, CHEWABLE ORAL DAILY
Qty: 90 TABLET | Refills: 3 | Status: SHIPPED | OUTPATIENT
Start: 2025-08-25

## (undated) DEVICE — ENDO SNARE EXACTO COLD 9MM LOOP 2.4MMX230CM 00711115

## (undated) DEVICE — SHTH INTRO 0.021IN ID 6FR DIA

## (undated) DEVICE — Device

## (undated) DEVICE — KIT HAND CONTROL ACIST 014644 AR-P54

## (undated) DEVICE — SUCTION MANIFOLD NEPTUNE 2 SYS 4 PORT 0702-020-000

## (undated) DEVICE — ENDO BRUSH CHANNEL MASTER CLEANING 2-4.2MM BW-412T

## (undated) DEVICE — PACK HEART LEFT CUSTOM

## (undated) DEVICE — TUBING PRESSURE 30"

## (undated) DEVICE — TUBING SUCTION 10'X3/16" N510

## (undated) DEVICE — MANIFOLD KIT ANGIO AUTOMATED 014613

## (undated) DEVICE — SLEEVE TR BAND RADIAL COMPRESSION DEVICE 24CM TRB24-REG

## (undated) DEVICE — ENDO KIT COMPLIANCE DYKENDOCMPLY

## (undated) DEVICE — SOL WATER 1500ML

## (undated) DEVICE — ENDO TRAP POLYP E-TRAP 00711099

## (undated) RX ORDER — NITROGLYCERIN 5 MG/ML
VIAL (ML) INTRAVENOUS
Status: DISPENSED
Start: 2021-10-29

## (undated) RX ORDER — LIDOCAINE HYDROCHLORIDE 10 MG/ML
INJECTION, SOLUTION INFILTRATION; PERINEURAL
Status: DISPENSED
Start: 2022-09-06

## (undated) RX ORDER — BETAMETHASONE SODIUM PHOSPHATE AND BETAMETHASONE ACETATE 3; 3 MG/ML; MG/ML
INJECTION, SUSPENSION INTRA-ARTICULAR; INTRALESIONAL; INTRAMUSCULAR; SOFT TISSUE
Status: DISPENSED
Start: 2022-09-06

## (undated) RX ORDER — REGADENOSON 0.08 MG/ML
INJECTION, SOLUTION INTRAVENOUS
Status: DISPENSED
Start: 2022-03-04

## (undated) RX ORDER — LIDOCAINE HYDROCHLORIDE 10 MG/ML
INJECTION, SOLUTION INFILTRATION; PERINEURAL
Status: DISPENSED
Start: 2023-02-13

## (undated) RX ORDER — BUPIVACAINE HYDROCHLORIDE 5 MG/ML
INJECTION, SOLUTION EPIDURAL; INTRACAUDAL
Status: DISPENSED
Start: 2023-02-13

## (undated) RX ORDER — ASPIRIN 81 MG/1
TABLET, CHEWABLE ORAL
Status: DISPENSED
Start: 2021-10-29

## (undated) RX ORDER — NICARDIPINE HCL-0.9% SOD CHLOR 1 MG/10 ML
SYRINGE (ML) INTRAVENOUS
Status: DISPENSED
Start: 2021-10-29

## (undated) RX ORDER — LIDOCAINE HYDROCHLORIDE 10 MG/ML
INJECTION, SOLUTION INFILTRATION; PERINEURAL
Status: DISPENSED
Start: 2020-10-23

## (undated) RX ORDER — SODIUM CHLORIDE 9 MG/ML
INJECTION, SOLUTION INTRAVENOUS
Status: DISPENSED
Start: 2021-10-29

## (undated) RX ORDER — FENTANYL CITRATE 50 UG/ML
INJECTION, SOLUTION INTRAMUSCULAR; INTRAVENOUS
Status: DISPENSED
Start: 2021-10-29

## (undated) RX ORDER — HEPARIN SODIUM 1000 [USP'U]/ML
INJECTION, SOLUTION INTRAVENOUS; SUBCUTANEOUS
Status: DISPENSED
Start: 2021-10-29